# Patient Record
Sex: FEMALE | Race: WHITE | NOT HISPANIC OR LATINO | Employment: OTHER | URBAN - NONMETROPOLITAN AREA
[De-identification: names, ages, dates, MRNs, and addresses within clinical notes are randomized per-mention and may not be internally consistent; named-entity substitution may affect disease eponyms.]

---

## 2017-01-23 ENCOUNTER — TELEPHONE (OUTPATIENT)
Dept: FAMILY MEDICINE | Facility: OTHER | Age: 77
End: 2017-01-23

## 2017-01-23 NOTE — TELEPHONE ENCOUNTER
Patient was advised to go to Urgent Care for evaluation and declined as she feels fine now, but would like to schedule an office visit with Dr. Bolaños for a future date to 'touch base'. She has been scheduled for 1/30/17 and placed on wait list. Patient verbalizes understanding to go to ER/UC should she not feel well.

## 2017-01-23 NOTE — TELEPHONE ENCOUNTER
Please schedule patient for date/time: should go to urgent care to be evaluated     Have patient go to ER/Urgent Care Center. Urgent Care hours are 9:30 am to 8 pm, open 7 days a week. Yes.    Provider will call patient.No.    Other:

## 2017-01-30 ENCOUNTER — OFFICE VISIT (OUTPATIENT)
Dept: FAMILY MEDICINE | Facility: OTHER | Age: 77
End: 2017-01-30
Attending: FAMILY MEDICINE
Payer: MEDICARE

## 2017-01-30 VITALS
OXYGEN SATURATION: 96 % | DIASTOLIC BLOOD PRESSURE: 78 MMHG | BODY MASS INDEX: 29.29 KG/M2 | HEART RATE: 74 BPM | SYSTOLIC BLOOD PRESSURE: 118 MMHG | WEIGHT: 168 LBS | RESPIRATION RATE: 20 BRPM

## 2017-01-30 DIAGNOSIS — R07.9 CHEST PAIN, UNSPECIFIED TYPE: Primary | ICD-10-CM

## 2017-01-30 DIAGNOSIS — F41.9 ANXIETY: ICD-10-CM

## 2017-01-30 PROCEDURE — 99212 OFFICE O/P EST SF 10 MIN: CPT

## 2017-01-30 PROCEDURE — 99213 OFFICE O/P EST LOW 20 MIN: CPT | Performed by: FAMILY MEDICINE

## 2017-01-30 RX ORDER — SERTRALINE HYDROCHLORIDE 25 MG/1
25 TABLET, FILM COATED ORAL DAILY
Qty: 30 TABLET | Refills: 1 | Status: SHIPPED | OUTPATIENT
Start: 2017-01-30 | End: 2017-02-28

## 2017-01-30 ASSESSMENT — ANXIETY QUESTIONNAIRES
3. WORRYING TOO MUCH ABOUT DIFFERENT THINGS: SEVERAL DAYS
7. FEELING AFRAID AS IF SOMETHING AWFUL MIGHT HAPPEN: SEVERAL DAYS
6. BECOMING EASILY ANNOYED OR IRRITABLE: SEVERAL DAYS
IF YOU CHECKED OFF ANY PROBLEMS ON THIS QUESTIONNAIRE, HOW DIFFICULT HAVE THESE PROBLEMS MADE IT FOR YOU TO DO YOUR WORK, TAKE CARE OF THINGS AT HOME, OR GET ALONG WITH OTHER PEOPLE: SOMEWHAT DIFFICULT
1. FEELING NERVOUS, ANXIOUS, OR ON EDGE: MORE THAN HALF THE DAYS
GAD7 TOTAL SCORE: 7
2. NOT BEING ABLE TO STOP OR CONTROL WORRYING: SEVERAL DAYS
5. BEING SO RESTLESS THAT IT IS HARD TO SIT STILL: NOT AT ALL

## 2017-01-30 ASSESSMENT — PATIENT HEALTH QUESTIONNAIRE - PHQ9: 5. POOR APPETITE OR OVEREATING: SEVERAL DAYS

## 2017-01-30 ASSESSMENT — PAIN SCALES - GENERAL: PAINLEVEL: NO PAIN (0)

## 2017-01-30 NOTE — PROGRESS NOTES
[facility]    Iris Ellis, 76 year old, female presents with   Chief Complaint   Patient presents with     Respiratory Problems     had some chest discomfort 2 weeks ago, hasn't had pain since. This lasted about 5 minutes in the epigastric area when she was reading in bed. She has occasional heartburn that is relieved with 2 Tums about 2 times per week. No pain with walking stairs or carrying groceries. She is anxious with caring for a new puppy that she is training and concerns with her heart with this episode. No previous history of heart diease       PAST MEDICAL HISTORY:  Past Medical History   Diagnosis Date     Pure hypercholesterolemia 11/22/1999     Unspecified essential hypertension 07/30/2006     Symptomatic menopausal or female climacteric states 03/31/2003     Family history of malignant neoplasm of gastrointestinal tract 04/25/2003     colon cancer in mother     psoriasis 04/21/2011     Unspecified closed fracture of pelvis 09/20/2011     Other abnormal blood chemistry 09/20/2012     Cystic disease of ovaries      Osteopenia      Dexa 2004,2006,2009     Blood glucose elevated      Osteoarthritis 1/20011     Right hip LYNN, Dr Nelson       PAST SURGICAL HISTORY:  Past Surgical History   Procedure Laterality Date     Pacs, pvcs, svt, bradycardia on holter moniter  2009     Dr. Pace     Left superior and inferior pubic rami fractures  2006     Urethral dilation  2003     Urethral stricture     Declines further mammograms  2009     Colonoscopies  1998, 2003, 2007, 2009, 9/18/2014     Colonic polyps. Repeat colonoscopy 2013     Benign positional vertigo       Cholecystectomy  1979     Appendectomy  1944     Orthopedic surgery  2011     total right hip. Dr. Nelson       SOCIAL HISTORY  Social History     Social History     Marital Status:      Spouse Name: N/A     Number of Children: N/A     Years of Education: N/A     Occupational History      Retired     Social History  Main Topics     Smoking status: Former Smoker     Types: Cigarettes     Smokeless tobacco: Never Used     Alcohol Use: Yes      Comment: rarely     Drug Use: No     Sexual Activity: Not on file     Other Topics Concern     Caffeine Concern Yes     coffee-16 oz daily     Parent/Sibling W/ Cabg, Mi Or Angioplasty Before 65f 55m? No     Social History Narrative       MEDICATIONS:  Prior to Admission medications    Medication Sig Start Date End Date Taking? Authorizing Provider   sertraline (ZOLOFT) 25 MG tablet Take 1 tablet (25 mg) by mouth daily 1/30/17  Yes Anna Bolaños MD   lisinopril (PRINIVIL,ZESTRIL) 10 MG tablet Take 1 tablet (10mg) by oral route every day 10/19/16  Yes Anna Bolaños MD   rosuvastatin (CRESTOR) 10 MG tablet Take 1 tablet (10 mg) by mouth daily 7/31/16  Yes Anna Bolaños MD   ASPIRIN PO Take 81 mg by mouth daily   Yes Reported, Patient   Multiple Vitamins-Minerals (OCUVITE PO) Take 1 capsule by mouth daily.   Yes Reported, Patient   Calcium Carbonate-Vit D-Min (CALCIUM 1200 PO) Take  by mouth. Take 1 daily   Yes Reported, Patient   Lactobacillus (ACIDOPHILUS PO) Take  by mouth. 1 daily   Yes Reported, Patient       ALLERGIES:     Allergies   Allergen Reactions     Acetaminophen Nausea     Lortab     Hydrocodone Bitartrate Nausea     Lortab     Naproxen Nausea     Naprosyn     Rofecoxib      Upsets stomach  Vioxx       ROS:  C: NEGATIVE for fever, chills, change in weight  R: NEGATIVE for significant cough or SOB  GI: NEGATIVE for nausea, abdominal pain, heartburn, or change in bowel habits  N: NEGATIVE for weakness, dizziness or paresthesias  P: NEGATIVE for changes in mood or affect    EXAM:  /78 mmHg  Pulse 74  Resp 20  Wt 168 lb (76.204 kg)  SpO2 96% Body mass index is 29.29 kg/(m^2).   GENERAL APPEARANCE: healthy, alert and no distress  NECK: no adenopathy, no asymmetry, masses, or scars and thyroid normal to palpation  RESP: lungs clear to auscultation - no rales, rhonchi  or wheezes  CV: regular rates and rhythm, normal S1 S2, no S3 or S4, no murmur, click or rub and no bruits heard  ABDOMEN: soft, nontender, without hepatosplenomegaly or masses, bowel sounds normal and no bruits heard  NEURO: Normal strength and tone, mentation intact and speech normal  PSYCH: mentation appears normal and affect normal/bright    LABS AND IMAGING:           ASSESSMENT/PLAN:  (R07.9) Chest pain, unspecified type  (primary encounter diagnosis)  Comment:   Plan: NM Lexiscan stress test        Will proceed with stress test to evaluate further. Discussed that I feel this is most likely reflux but we want to check it out    (F41.9) Anxiety  Comment:   Plan: sertraline (ZOLOFT) 25 MG tablet        Will start this daily and recheck in one month, sooner for concerns      Anna Bolaños MD  January 30, 2017

## 2017-01-30 NOTE — MR AVS SNAPSHOT
After Visit Summary   1/30/2017    Iris Ellis    MRN: [id number]           Patient Information     Date Of Birth          1940        Visit Information        Provider Department      1/30/2017 9:45 AM Anna Bolaños MD [facility]        Today's Diagnoses     Chest pain, unspecified type    -  1     Anxiety            Follow-ups after your visit        Follow-up notes from your care team     Return in about 4 weeks (around 2/27/2017) for medication check sertraline.      Your next 10 appointments already scheduled     Feb 16, 2017 11:15 AM   (Arrive by 11:00 AM)   SHORT with Anna Bolaños MD   [facility] ([facility])    [address]  [place] [address]   [phone]              Who to contact     If you have questions or need follow up information about today's clinic visit or your schedule please contact [facility] directly at [phone].  Normal or non-critical lab and imaging results will be communicated to you by MyChart, letter or phone within 4 business days after the clinic has received the results. If you do not hear from us within 7 days, please contact the clinic through [employer] or phone. If you have a critical or abnormal lab result, we will notify you by phone as soon as possible.  Submit refill requests through [employer] or call your pharmacy and they will forward the refill request to us. Please allow 3 business days for your refill to be completed.          Additional Information About Your Visit        MyChart Information     [employer] gives you secure access to your electronic health record. If you see a primary care provider, you can also send messages to your care team and make appointments. If you have questions, please call your primary care clinic.  If you do not have a primary care provider, please call [phone] and they will assist you.        Care EveryWhere ID     This is your Care EveryWhere ID. This  could be used by other organizations to access your [place] medical records  [id number]        Your Vitals Were     Pulse Respirations Pulse Oximetry             74 20 96%          Blood Pressure from Last 3 Encounters:   01/30/17 118/78   08/16/16 112/64   01/19/15 128/72    Weight from Last 3 Encounters:   01/30/17 168 lb (76.204 kg)   08/16/16 155 lb (70.308 kg)   01/19/15 155 lb (70.308 kg)              We Performed the Following     NM Lexiscan stress test          Today's Medication Changes          These changes are accurate as of: 1/30/17 10:26 AM.  If you have any questions, ask your nurse or doctor.               Start taking these medicines.        Dose/Directions    sertraline 25 MG tablet   Commonly known as:  ZOLOFT   Used for:  Anxiety   Started by:  Anna Bolaños MD        Dose:  25 mg   Take 1 tablet (25 mg) by mouth daily   Quantity:  30 tablet   Refills:  1            Where to get your medicines      These medications were sent to [facility]  [address]  [address] [place] [address]     Phone:  [phone]    - sertraline 25 MG tablet             Primary Care Provider Office Phone # Fax #    Anna Bolaños [phone]       [facility] [address] [place] [address]        Thank you!     Thank you for choosing [facility]  for your care. Our goal is always to provide you with excellent care. Hearing back from our patients is one way we can continue to improve our services. Please take a few minutes to complete the written survey that you may receive in the mail after your visit with us. Thank you!             Your Updated Medication List - Protect others around you: Learn how to safely use, store and throw away your medicines at www.disposemymeds.org.          This list is accurate as of: 1/30/17 10:26 AM.  Always use your most recent med list.                   Brand Name Dispense Instructions for use     ACIDOPHILUS PO      Take  by mouth. 1 daily       ASPIRIN PO      Take 81 mg by mouth daily       CALCIUM 1200 PO      Take  by mouth. Take 1 daily       lisinopril 10 MG tablet    PRINIVIL/ZESTRIL    90 tablet    Take 1 tablet (10mg) by oral route every day       OCUVITE PO      Take 1 capsule by mouth daily.       rosuvastatin 10 MG tablet    CRESTOR    90 tablet    Take 1 tablet (10 mg) by mouth daily       sertraline 25 MG tablet    ZOLOFT    30 tablet    Take 1 tablet (25 mg) by mouth daily

## 2017-01-30 NOTE — NURSING NOTE
"Chief Complaint   Patient presents with     Respiratory Problems     had some chest discomfort 2 weeks ago, hasn't had pain since.        Initial /78 mmHg  Pulse 74  Resp 20  Wt 168 lb (76.204 kg)  SpO2 96% Estimated body mass index is 29.29 kg/(m^2) as calculated from the following:    Height as of 1/19/15: 5' 3.5\" (1.613 m).    Weight as of this encounter: 168 lb (76.204 kg).  BP completed using cuff size: huber White      "

## 2017-01-31 ASSESSMENT — ANXIETY QUESTIONNAIRES: GAD7 TOTAL SCORE: 7

## 2017-01-31 ASSESSMENT — PATIENT HEALTH QUESTIONNAIRE - PHQ9: SUM OF ALL RESPONSES TO PHQ QUESTIONS 1-9: 7

## 2017-02-06 DIAGNOSIS — R07.9 CHEST PAIN, UNSPECIFIED: Primary | ICD-10-CM

## 2017-02-09 ENCOUNTER — HOSPITAL ENCOUNTER (OUTPATIENT)
Dept: NUCLEAR MEDICINE | Facility: HOSPITAL | Age: 77
Discharge: HOME | End: 2017-02-09
Attending: FAMILY MEDICINE | Admitting: FAMILY MEDICINE
Payer: MEDICARE

## 2017-02-09 PROCEDURE — 78452 HT MUSCLE IMAGE SPECT MULT: CPT | Mod: TC

## 2017-02-09 PROCEDURE — 93018 CV STRESS TEST I&R ONLY: CPT | Performed by: INTERNAL MEDICINE

## 2017-02-09 PROCEDURE — A9500 TC99M SESTAMIBI: HCPCS | Mod: TC

## 2017-02-09 PROCEDURE — 93017 CV STRESS TEST TRACING ONLY: CPT | Mod: TC

## 2017-02-09 PROCEDURE — 93016 CV STRESS TEST SUPVJ ONLY: CPT | Performed by: INTERNAL MEDICINE

## 2017-02-09 RX ORDER — REGADENOSON 0.08 MG/ML
0.4 INJECTION, SOLUTION INTRAVENOUS ONCE
Status: COMPLETED | OUTPATIENT
Start: 2017-02-09 | End: 2017-02-09

## 2017-02-09 RX ADMIN — REGADENOSON 0.4 MG: 0.08 INJECTION, SOLUTION INTRAVENOUS at 09:45

## 2017-02-28 ENCOUNTER — OFFICE VISIT (OUTPATIENT)
Dept: FAMILY MEDICINE | Facility: OTHER | Age: 77
End: 2017-02-28
Attending: FAMILY MEDICINE
Payer: MEDICARE

## 2017-02-28 VITALS
BODY MASS INDEX: 29.29 KG/M2 | RESPIRATION RATE: 20 BRPM | OXYGEN SATURATION: 98 % | WEIGHT: 168 LBS | SYSTOLIC BLOOD PRESSURE: 122 MMHG | HEART RATE: 70 BPM | DIASTOLIC BLOOD PRESSURE: 74 MMHG

## 2017-02-28 DIAGNOSIS — F41.9 ANXIETY: Primary | ICD-10-CM

## 2017-02-28 DIAGNOSIS — K21.9 GASTROESOPHAGEAL REFLUX DISEASE WITHOUT ESOPHAGITIS: ICD-10-CM

## 2017-02-28 PROCEDURE — 99213 OFFICE O/P EST LOW 20 MIN: CPT | Performed by: FAMILY MEDICINE

## 2017-02-28 PROCEDURE — 99212 OFFICE O/P EST SF 10 MIN: CPT

## 2017-02-28 RX ORDER — SERTRALINE HYDROCHLORIDE 25 MG/1
50 TABLET, FILM COATED ORAL DAILY
Qty: 60 TABLET | Refills: 1 | Status: SHIPPED | OUTPATIENT
Start: 2017-02-28 | End: 2017-04-24

## 2017-02-28 ASSESSMENT — PAIN SCALES - GENERAL: PAINLEVEL: NO PAIN (0)

## 2017-02-28 NOTE — NURSING NOTE
"Chief Complaint   Patient presents with     Anxiety     follow up Sertraline. Symptoms stable       Initial /74  Pulse 70  Resp 20  Wt 168 lb (76.2 kg)  SpO2 98%  BMI 29.29 kg/m2 Estimated body mass index is 29.29 kg/(m^2) as calculated from the following:    Height as of 1/19/15: 5' 3.5\" (1.613 m).    Weight as of this encounter: 168 lb (76.2 kg).  Medication Reconciliation: complete   Alba White      "

## 2017-02-28 NOTE — MR AVS SNAPSHOT
After Visit Summary   2/28/2017    Iris Ellis    MRN: [id number]           Patient Information     Date Of Birth          1940        Visit Information        Provider Department      2/28/2017 10:45 AM Anna Bolaños MD [facility]        Today's Diagnoses     Anxiety    -  1    Gastroesophageal reflux disease without esophagitis           Follow-ups after your visit        Follow-up notes from your care team     Return in about 4 weeks (around 3/28/2017) for medication check.      Your next 10 appointments already scheduled     Apr 04, 2017 10:45 AM CDT   (Arrive by 10:30 AM)   SHORT with MD [facility] ([facility] [place] [facility])    [address]  [place] [address]   [phone]              Who to contact     If you have questions or need follow up information about today's clinic visit or your schedule please contact [facility] directly at [phone].  Normal or non-critical lab and imaging results will be communicated to you by MyChart, letter or phone within 4 business days after the clinic has received the results. If you do not hear from us within 7 days, please contact the clinic through [employer] or phone. If you have a critical or abnormal lab result, we will notify you by phone as soon as possible.  Submit refill requests through [employer] or call your pharmacy and they will forward the refill request to us. Please allow 3 business days for your refill to be completed.          Additional Information About Your Visit        MyChart Information     [employer] gives you secure access to your electronic health record. If you see a primary care provider, you can also send messages to your care team and make appointments. If you have questions, please call your primary care clinic.  If you do not have a primary care provider, please call [phone] and they will assist you.        Care EveryWhere ID     This is your Care  EveryWhere ID. This could be used by other organizations to access your [place] medical records  [id number]        Your Vitals Were     Pulse Respirations Pulse Oximetry BMI (Body Mass Index)          70 20 98% 29.29 kg/m2         Blood Pressure from Last 3 Encounters:   02/28/17 122/74   01/30/17 118/78   08/16/16 112/64    Weight from Last 3 Encounters:   02/28/17 168 lb (76.2 kg)   01/30/17 168 lb (76.2 kg)   08/16/16 155 lb (70.3 kg)              Today, you had the following     No orders found for display         Today's Medication Changes          These changes are accurate as of: 2/28/17 11:18 AM.  If you have any questions, ask your nurse or doctor.               These medicines have changed or have updated prescriptions.        Dose/Directions    sertraline 25 MG tablet   Commonly known as:  ZOLOFT   This may have changed:  how much to take   Used for:  Anxiety   Changed by:  Anna Bolaños MD        Dose:  50 mg   Take 2 tablets (50 mg) by mouth daily   Quantity:  60 tablet   Refills:  1            Where to get your medicines      These medications were sent to [facility] - [address]  [address] [place] [address]     Phone:  [phone]     sertraline 25 MG tablet                Primary Care Provider Office Phone # Fax #    Anna Bolaños [phone]       [facility] [address] [place] [place] [address]        Thank you!     Thank you for choosing [facility]  for your care. Our goal is always to provide you with excellent care. Hearing back from our patients is one way we can continue to improve our services. Please take a few minutes to complete the written survey that you may receive in the mail after your visit with us. Thank you!             Your Updated Medication List - Protect others around you: Learn how to safely use, store and throw away your medicines at www.disposemymeds.org.          This list is accurate as  of: 2/28/17 11:18 AM.  Always use your most recent med list.                   Brand Name Dispense Instructions for use    ACIDOPHILUS PO      Take  by mouth. 1 daily       ASPIRIN PO      Take 81 mg by mouth daily       CALCIUM 1200 PO      Take  by mouth. Take 1 daily       lisinopril 10 MG tablet    PRINIVIL/ZESTRIL    90 tablet    Take 1 tablet (10mg) by oral route every day       OCUVITE PO      Take 1 capsule by mouth daily.       rosuvastatin 10 MG tablet    CRESTOR    90 tablet    Take 1 tablet (10 mg) by mouth daily       sertraline 25 MG tablet    ZOLOFT    60 tablet    Take 2 tablets (50 mg) by mouth daily

## 2017-03-01 NOTE — PROGRESS NOTES
[facility]    Iris Ellis, 76 year old, female presents with   Chief Complaint   Patient presents with     Anxiety     follow up Sertraline. Symptoms stable. Gave PHQ and IMLES but pt did not fill out     Gastrophageal Reflux     she is having reflux intermittently and is drinking tonic water for restless legs. She is wondering if the tonic water is bothering her with its carbonation       PAST MEDICAL HISTORY:  Past Medical History   Diagnosis Date     Blood glucose elevated      Cystic disease of ovaries      Family history of malignant neoplasm of gastrointestinal tract 04/25/2003     colon cancer in mother     Osteoarthritis 1/20011     Right hip LYNN, Dr Nelson     Osteopenia      Dexa 2004,2006,2009     Other abnormal blood chemistry 09/20/2012     psoriasis 04/21/2011     Pure hypercholesterolemia 11/22/1999     Symptomatic menopausal or female climacteric states 03/31/2003     Unspecified closed fracture of pelvis 09/20/2011     Unspecified essential hypertension 07/30/2006       PAST SURGICAL HISTORY:  Past Surgical History   Procedure Laterality Date     Pacs, pvcs, svt, bradycardia on holter moniter  2009     Dr. Pace     Left superior and inferior pubic rami fractures  2006     Urethral dilation  2003     Urethral stricture     Declines further mammograms  2009     Colonoscopies  1998, 2003, 2007, 2009, 9/18/2014     Colonic polyps. Repeat colonoscopy 2013     Benign positional vertigo       Cholecystectomy  1979     Appendectomy  1944     Orthopedic surgery  2011     total right hip. Dr. Nelson       SOCIAL HISTORY  Social History     Social History     Marital status:      Spouse name: N/A     Number of children: N/A     Years of education: N/A     Occupational History      Retired     Social History Main Topics     Smoking status: Former Smoker     Types: Cigarettes     Smokeless tobacco: Never Used     Alcohol use Yes      Comment: rarely     Drug use: No      Sexual activity: Not on file     Other Topics Concern     Caffeine Concern Yes     coffee-16 oz daily     Parent/Sibling W/ Cabg, Mi Or Angioplasty Before 65f 55m? No     Social History Narrative       MEDICATIONS:  Prior to Admission medications    Medication Sig Start Date End Date Taking? Authorizing Provider   sertraline (ZOLOFT) 25 MG tablet Take 2 tablets (50 mg) by mouth daily 2/28/17  Yes Anna Bolaños MD   lisinopril (PRINIVIL,ZESTRIL) 10 MG tablet Take 1 tablet (10mg) by oral route every day 10/19/16  Yes Anna Bolaños MD   rosuvastatin (CRESTOR) 10 MG tablet Take 1 tablet (10 mg) by mouth daily 7/31/16  Yes Anna Bolaños MD   ASPIRIN PO Take 81 mg by mouth daily   Yes Reported, Patient   Multiple Vitamins-Minerals (OCUVITE PO) Take 1 capsule by mouth daily.   Yes Reported, Patient   Calcium Carbonate-Vit D-Min (CALCIUM 1200 PO) Take  by mouth. Take 1 daily   Yes Reported, Patient   Lactobacillus (ACIDOPHILUS PO) Take  by mouth. 1 daily   Yes Reported, Patient       ALLERGIES:     Allergies   Allergen Reactions     Acetaminophen Nausea     Lortab     Hydrocodone Bitartrate Nausea     Lortab     Naproxen Nausea     Naprosyn     Rofecoxib      Upsets stomach  Vioxx       ROS:  C: NEGATIVE for fever, chills, change in weight  R: NEGATIVE for significant cough or SOB  CV: NEGATIVE for chest pain, palpitations or peripheral edema  N: NEGATIVE for weakness, dizziness or paresthesias  P: NEGATIVE for changes in mood or affect    EXAM:  /74  Pulse 70  Resp 20  Wt 168 lb (76.2 kg)  SpO2 98%  BMI 29.29 kg/m2 Body mass index is 29.29 kg/(m^2).   GENERAL APPEARANCE: healthy, alert and no distress  RESP: lungs clear to auscultation - no rales, rhonchi or wheezes  CV: regular rates and rhythm, normal S1 S2, no S3 or S4 and no murmur, click or rub  NEURO: Normal strength and tone, mentation intact and speech normal  PSYCH: mentation appears normal and affect normal/bright    LABS AND IMAGING:     Results for  orders placed or performed in visit on 02/06/17   Stress EKG Interpretation    Narrative    LEXISCAN CARDIOLITE STUDY-INTERNAL MEDICINE DICTATION  DICTATING PHYSICIAN: Ej West MD    INDICATIONS:  A 76-year-old female referred by Dr. Anna Bolaños  because of chest pain.    FINDINGS:  The patient was placed upon the table using our protocol,  given 5 mL/0.4 mg of Lexiscan  rapidly over 15 seconds followed by  saline flush. She was then given the Sestamibi at 45 seconds into the  study.  Resting heart rate was 72 with blood pressure 140/60.  Heart  rate jhoana to 88 and blood pressure dropped to 125/60.  She had some  transient stomach discomfort.  Electrocardiogram shows some  nonspecific ST-T changes at rest and throughout the entire study.  She  had no arrhythmias.    ASSESSMENT:  Lexiscan Sestamibi study. The patient had appropriate  heart rate and blood pressure response.  Transient symptoms.  No acute  changes were noted on the electrocardiogram.  There were no  arrhythmias.  The Cardiolite scan is pending.    CARDIOLITE IV LEXISCAN AND GATED SPECT-RADIOLOGIST DICTATION  DICTATING PHYSICIAN:  Cesar Nelson MD    LEXISCAN DOSE: 5 mL (0.4 mg regadenoson) by rapid intravenous  injection.  SESTAMIBI DOSE: 10 mCi 99mTc, 1 mL MIBI   Injection Time:  0711  SESTAMIBI DOSE: 30 mCi 99mTc, 1 mL MIBI   Injection Time:  1001  ANGIOCATH SIZE:  22  INJECTION SITE:  Right wrist  INITIALS:  SE  IV DISCONTINUE TIME:  @@  SITE CONDITION: No infiltrate  TIP INTACT:  Yes    The patient is a 76-year-old female who was evaluated with  Sestamibi/Lexiscan testing. 5 mL Lexiscan (0.4 mg regadenoson) was  administered by rapid intravenous injection; followed immediately by  saline flush and radiopharmaceutical.  The patient's resting heart  rate was 72 and blood pressure was 140/60. The maximum response in  heart rate and blood pressure after Lexiscan injection was 88 and  125/60.  Continued on page 2...      Symptoms during the  procedure included:   Transient stomach  discomfort.    If symptoms were present, did they resolve post-test?   Yes.    Electrocardiogram monitoring demonstrates:   Nonspecific ST-T changes  at rest and throughout the study.    Arrhythmias:   None.    Summary:   Appropriate heart rate and blood pressure response.  Transient symptoms.  No acute electrocardiogram changes.    VIEWS OBTAINED:  Short axis, horizontal long axis, vertical long axis  at rest and stress.    FINDINGS:  On the rest and stress images, there was a normal  distribution of tracer activity throughout the myocardium.  There is  no evidence of myocardial ischemia.  Rest gated image reveals end  diastolic volume of 63 mL, end systolic volume of 14 mL.  Calculated  ejection fraction was 78%.  No wall motion abnormalities are noted.    IMPRESSION:  1.  No evidence of myocardial ischemia.  2.  Normal left ventricular function.                        SIGNATURE PAGE ONLY  Exam Date: Feb 09, 2017 10:11:08 AM  Author: RONN LAURA  This report is final and signed           ASSESSMENT/PLAN:  (F41.9) Anxiety  (primary encounter diagnosis)  Comment: improved  Plan: sertraline (ZOLOFT) 25 MG tablet        Will increase to 2-25 mg tablets for 50 mg daily and recheck in 4-6 weeks. Her negative stress test was a relief for her    (K21.9) Gastroesophageal reflux disease without esophagitis  Comment:   Plan: advised stirring out the carbonation of the tonic water to see if this is helpful  Use OTC medications as needed prn for heartburn as this is an intermittent problem    Anna Bolaños MD  March 1, 2017

## 2017-03-08 DIAGNOSIS — E78.5 HYPERLIPIDEMIA LDL GOAL <100: ICD-10-CM

## 2017-03-08 DIAGNOSIS — I10 BENIGN ESSENTIAL HYPERTENSION: ICD-10-CM

## 2017-03-08 RX ORDER — ROSUVASTATIN CALCIUM 10 MG/1
10 TABLET, COATED ORAL DAILY
Qty: 90 TABLET | Refills: 1 | Status: SHIPPED
Start: 2017-03-08 | End: 2017-04-24

## 2017-03-08 RX ORDER — LISINOPRIL 10 MG/1
TABLET ORAL
Qty: 90 TABLET | Refills: 1 | Status: SHIPPED
Start: 2017-03-08 | End: 2017-04-24

## 2017-04-24 ENCOUNTER — OFFICE VISIT (OUTPATIENT)
Dept: FAMILY MEDICINE | Facility: OTHER | Age: 77
End: 2017-04-24
Attending: FAMILY MEDICINE
Payer: MEDICARE

## 2017-04-24 VITALS
TEMPERATURE: 96.5 F | DIASTOLIC BLOOD PRESSURE: 72 MMHG | OXYGEN SATURATION: 95 % | SYSTOLIC BLOOD PRESSURE: 126 MMHG | BODY MASS INDEX: 28.77 KG/M2 | HEART RATE: 75 BPM | WEIGHT: 165 LBS

## 2017-04-24 DIAGNOSIS — E78.5 HYPERLIPIDEMIA LDL GOAL <100: ICD-10-CM

## 2017-04-24 DIAGNOSIS — K21.9 GASTROESOPHAGEAL REFLUX DISEASE WITHOUT ESOPHAGITIS: ICD-10-CM

## 2017-04-24 DIAGNOSIS — I10 BENIGN ESSENTIAL HYPERTENSION: ICD-10-CM

## 2017-04-24 DIAGNOSIS — F41.9 ANXIETY: Primary | ICD-10-CM

## 2017-04-24 PROCEDURE — 99212 OFFICE O/P EST SF 10 MIN: CPT

## 2017-04-24 PROCEDURE — 99214 OFFICE O/P EST MOD 30 MIN: CPT | Performed by: FAMILY MEDICINE

## 2017-04-24 RX ORDER — ROSUVASTATIN CALCIUM 10 MG/1
10 TABLET, COATED ORAL DAILY
Qty: 90 TABLET | Refills: 1 | Status: SHIPPED | OUTPATIENT
Start: 2017-04-24 | End: 2017-07-27

## 2017-04-24 RX ORDER — SERTRALINE HYDROCHLORIDE 25 MG/1
25 TABLET, FILM COATED ORAL DAILY
Qty: 90 TABLET | Refills: 1 | Status: SHIPPED | OUTPATIENT
Start: 2017-04-24 | End: 2017-11-20

## 2017-04-24 RX ORDER — OMEPRAZOLE 40 MG/1
40 CAPSULE, DELAYED RELEASE ORAL DAILY
Qty: 90 CAPSULE | Refills: 1 | Status: SHIPPED | OUTPATIENT
Start: 2017-04-24 | End: 2018-06-21

## 2017-04-24 RX ORDER — LISINOPRIL 10 MG/1
TABLET ORAL
Qty: 90 TABLET | Refills: 1 | Status: SHIPPED | OUTPATIENT
Start: 2017-04-24 | End: 2017-11-20

## 2017-04-24 ASSESSMENT — ANXIETY QUESTIONNAIRES
5. BEING SO RESTLESS THAT IT IS HARD TO SIT STILL: NOT AT ALL
7. FEELING AFRAID AS IF SOMETHING AWFUL MIGHT HAPPEN: NOT AT ALL
1. FEELING NERVOUS, ANXIOUS, OR ON EDGE: NOT AT ALL
IF YOU CHECKED OFF ANY PROBLEMS ON THIS QUESTIONNAIRE, HOW DIFFICULT HAVE THESE PROBLEMS MADE IT FOR YOU TO DO YOUR WORK, TAKE CARE OF THINGS AT HOME, OR GET ALONG WITH OTHER PEOPLE: NOT DIFFICULT AT ALL
GAD7 TOTAL SCORE: 1
6. BECOMING EASILY ANNOYED OR IRRITABLE: SEVERAL DAYS
4. TROUBLE RELAXING: NOT AT ALL
3. WORRYING TOO MUCH ABOUT DIFFERENT THINGS: NOT AT ALL
2. NOT BEING ABLE TO STOP OR CONTROL WORRYING: NOT AT ALL

## 2017-04-24 ASSESSMENT — PAIN SCALES - GENERAL: PAINLEVEL: NO PAIN (0)

## 2017-04-24 NOTE — MR AVS SNAPSHOT
After Visit Summary   4/24/2017    Iris Ellis    MRN: [id number]           Patient Information     Date Of Birth          1940        Visit Information        Provider Department      4/24/2017 11:30 AM Anna Bolaños MD [facility]        Today's Diagnoses     Anxiety    -  1    Gastroesophageal reflux disease without esophagitis        Benign essential hypertension        Hyperlipidemia LDL goal <100           Follow-ups after your visit        Follow-up notes from your care team     Return in about 3 months (around 7/24/2017).      Who to contact     If you have questions or need follow up information about today's clinic visit or your schedule please contact [facility] directly at [phone].  Normal or non-critical lab and imaging results will be communicated to you by MyChart, letter or phone within 4 business days after the clinic has received the results. If you do not hear from us within 7 days, please contact the clinic through MyChart or phone. If you have a critical or abnormal lab result, we will notify you by phone as soon as possible.  Submit refill requests through Ratio or call your pharmacy and they will forward the refill request to us. Please allow 3 business days for your refill to be completed.          Additional Information About Your Visit        MyChart Information     Ratio gives you secure access to your electronic health record. If you see a primary care provider, you can also send messages to your care team and make appointments. If you have questions, please call your primary care clinic.  If you do not have a primary care provider, please call [phone] and they will assist you.        Care EveryWhere ID     This is your Care EveryWhere ID. This could be used by other organizations to access your [facility] medical records  [id number]        Your Vitals Were     Pulse Temperature Pulse Oximetry BMI (Body Mass Index)           75 96.5  F (35.8  C) (Tympanic) 95% 28.77 kg/m2         Blood Pressure from Last 3 Encounters:   04/24/17 126/72   02/28/17 122/74   01/30/17 118/78    Weight from Last 3 Encounters:   04/24/17 165 lb (74.8 kg)   02/28/17 168 lb (76.2 kg)   01/30/17 168 lb (76.2 kg)              Today, you had the following     No orders found for display         Today's Medication Changes          These changes are accurate as of: 4/24/17 10:21 PM.  If you have any questions, ask your nurse or doctor.               Start taking these medicines.        Dose/Directions    omeprazole 40 MG capsule   Commonly known as:  priLOSEC   Used for:  Gastroesophageal reflux disease without esophagitis   Started by:  Anna Bolaños MD        Dose:  40 mg   Take 1 capsule (40 mg) by mouth daily Take 30-60 minutes before a meal.   Quantity:  90 capsule   Refills:  1         These medicines have changed or have updated prescriptions.        Dose/Directions    multivitamin Tabs tablet   This may have changed:  how much to take   Used for:  Benign essential hypertension   Changed by:  Anna Bolaños MD        Dose:  1 tablet   Take 1 tablet by mouth daily   Quantity:  90 each   Refills:  3       sertraline 25 MG tablet   Commonly known as:  ZOLOFT   This may have changed:    - how much to take  - additional instructions   Used for:  Anxiety   Changed by:  Anna Bolaños MD        Dose:  25 mg   Take 1 tablet (25 mg) by mouth daily Has been taking 25 mg daily   Quantity:  90 tablet   Refills:  1            Where to get your medicines      Some of these will need a paper prescription and others can be bought over the counter.  Ask your nurse if you have questions.     Bring a paper prescription for each of these medications     lisinopril 10 MG tablet    multivitamin Tabs tablet    omeprazole 40 MG capsule    rosuvastatin 10 MG tablet    sertraline 25 MG tablet                Primary Care Provider Office Phone # Fax #    Anna Bolaños MD  [phone]       [facility] [place] [address]  [place] [address]        Thank you!     Thank you for choosing [facility]  for your care. Our goal is always to provide you with excellent care. Hearing back from our patients is one way we can continue to improve our services. Please take a few minutes to complete the written survey that you may receive in the mail after your visit with us. Thank you!             Your Updated Medication List - Protect others around you: Learn how to safely use, store and throw away your medicines at www.disposemymeds.org.          This list is accurate as of: 4/24/17 10:21 PM.  Always use your most recent med list.                   Brand Name Dispense Instructions for use    ACIDOPHILUS PO      Take  by mouth. 1 daily       ASPIRIN PO      Take 81 mg by mouth daily       CALCIUM 1200 PO      Take  by mouth. Take 1 daily       lisinopril 10 MG tablet    PRINIVIL/ZESTRIL    90 tablet    Take 1 tablet (10mg) by oral route every day       multivitamin Tabs tablet     90 each    Take 1 tablet by mouth daily       omeprazole 40 MG capsule    priLOSEC    90 capsule    Take 1 capsule (40 mg) by mouth daily Take 30-60 minutes before a meal.       rosuvastatin 10 MG tablet    CRESTOR    90 tablet    Take 1 tablet (10 mg) by mouth daily       sertraline 25 MG tablet    ZOLOFT    90 tablet    Take 1 tablet (25 mg) by mouth daily Has been taking 25 mg daily

## 2017-04-24 NOTE — NURSING NOTE
"Chief Complaint   Patient presents with     Anxiety     Improving with Sertraline--has been taking 25 mg daily     Gastrophageal Reflux     Improved since starting omeprazole       Initial /72 (BP Location: Right arm, Patient Position: Chair)  Pulse 75  Temp 96.5  F (35.8  C) (Tympanic)  Wt 165 lb (74.8 kg)  SpO2 95%  BMI 28.77 kg/m2 Estimated body mass index is 28.77 kg/(m^2) as calculated from the following:    Height as of 1/19/15: 5' 3.5\" (1.613 m).    Weight as of this encounter: 165 lb (74.8 kg).  Medication Reconciliation: complete     Lamar Luna      "

## 2017-04-25 ASSESSMENT — ANXIETY QUESTIONNAIRES: GAD7 TOTAL SCORE: 1

## 2017-04-25 NOTE — PROGRESS NOTES
[facility]    Iris Ellis, 77 year old, female presents with   Chief Complaint   Patient presents with     Anxiety     Improving with Sertraline--has been taking 25 mg daily. She is feeling much better.      Gastrophageal Reflux     Improved since starting omeprazole       PAST MEDICAL HISTORY:  Past Medical History:   Diagnosis Date     Blood glucose elevated      Cystic disease of ovaries      Family history of malignant neoplasm of gastrointestinal tract 04/25/2003    colon cancer in mother     Osteoarthritis 1/20011    Right hip L[facility]NN, Dr Nelson     Osteopenia     Dexa 2004,2006,2009     Other abnormal blood chemistry 09/20/2012     psoriasis 04/21/2011     Pure hypercholesterolemia 11/22/1999     Symptomatic menopausal or female climacteric states 03/31/2003     Unspecified closed fracture of pelvis 09/20/2011     Unspecified essential hypertension 07/30/2006       PAST SURGICAL HISTORY:  Past Surgical History:   Procedure Laterality Date     APPENDECTOMY  1944     Benign positional vertigo       CHOLECYSTECTOMY  1979     colonoscopies  1998, 2003, 2007, 2009, 9/18/2014    Colonic polyps. Repeat colonoscopy 2013     Declines further mammograms  2009     Left superior and inferior pubic rami fractures  2006     ORTHOPEDIC SURGERY  2011    total right hip. Dr. Nelson     PACs, PVCs, SVT, bradycardia on Holter Moniter  2009    Dr. Pace     Urethral dilation  2003    Urethral stricture       SOCIAL HISTORY  Social History     Social History     Marital status:      Spouse name: N/A     Number of children: N/A     Years of education: N/A     Occupational History      Retired     Social History Main Topics     Smoking status: Former Smoker     Types: Cigarettes     Smokeless tobacco: Never Used     Alcohol use Yes      Comment: rarely     Drug use: No     Sexual activity: Not on file     Other Topics Concern     Caffeine Concern Yes     coffee-16 oz daily     Parent/Sibling W/  Cabg, Mi Or Angioplasty Before 65f 55m? No     Social History Narrative       MEDICATIONS:  Prior to Admission medications    Medication Sig Start Date End Date Taking? Authorizing Provider   lisinopril (PRINIVIL/ZESTRIL) 10 MG tablet Take 1 tablet (10mg) by oral route every day 4/24/17  Yes Anna Bolaños MD   rosuvastatin (CRESTOR) 10 MG tablet Take 1 tablet (10 mg) by mouth daily 4/24/17  Yes Anna Bolaños MD   sertraline (ZOLOFT) 25 MG tablet Take 1 tablet (25 mg) by mouth daily Has been taking 25 mg daily 4/24/17  Yes Anna Bolaños MD   multivitamin (OCUVITE) TABS tablet Take 1 tablet by mouth daily 4/24/17  Yes Anna Bolaños MD   omeprazole (PRILOSEC) 40 MG capsule Take 1 capsule (40 mg) by mouth daily Take 30-60 minutes before a meal. 4/24/17  Yes Anna Bolaños MD   ASPIRIN PO Take 81 mg by mouth daily   Yes Reported, Patient   Calcium Carbonate-Vit D-Min (CALCIUM 1200 PO) Take  by mouth. Take 1 daily   Yes Reported, Patient   Lactobacillus (ACIDOPHILUS PO) Take  by mouth. 1 daily   Yes Reported, Patient       ALLERGIES:     Allergies   Allergen Reactions     Acetaminophen Nausea     Lortab     Hydrocodone Bitartrate Nausea     Lortab     Naproxen Nausea     Naprosyn     Rofecoxib      Upsets stomach  Vioxx       ROS:  C: NEGATIVE for fever, chills, change in weight  R: NEGATIVE for significant cough or SOB  CV: NEGATIVE for chest pain, palpitations or peripheral edema  GI: NEGATIVE for nausea, abdominal pain, heartburn, or change in bowel habits  N: NEGATIVE for weakness, dizziness or paresthesias  P: NEGATIVE for changes in mood or affect    EXAM:  /72 (BP Location: Right arm, Patient Position: Chair)  Pulse 75  Temp 96.5  F (35.8  C) (Tympanic)  Wt 165 lb (74.8 kg)  SpO2 95%  BMI 28.77 kg/m2 Body mass index is 28.77 kg/(m^2).   GENERAL APPEARANCE: healthy, alert and no distress  RESP: lungs clear to auscultation - no rales, rhonchi or wheezes  CV: regular rates and rhythm, normal S1 S2,  no S3 or S4 and no murmur, click or rub  NEURO: Normal strength and tone, mentation intact and speech normal  PSYCH: mentation appears normal and affect normal/bright    LABS AND IMAGING:           ASSESSMENT/PLAN:  (F41.9) Anxiety  (primary encounter diagnosis)  Comment: improved  Plan: sertraline (ZOLOFT) 25 MG tablet        Continue and recheck in 2-3 months, sooner for concerns    (K21.9) Gastroesophageal reflux disease without esophagitis  Comment: resolved on medication  Plan: omeprazole (PRILOSEC) 40 MG capsule        Continue and recheck in 2-3 months    (I10) Benign essential hypertension  Comment:   Plan: lisinopril (PRINIVIL/ZESTRIL) 10 MG tablet,         multivitamin (OCUVITE) TABS tablet        Medication renewed, controlled    (E78.5) Hyperlipidemia LDL goal <100  Comment:   Plan: rosuvastatin (CRESTOR) 10 MG tablet        Medication renewed      Anna Bolaños MD  April 24, 2017

## 2017-05-03 DIAGNOSIS — E78.5 HYPERLIPIDEMIA LDL GOAL <130: Primary | ICD-10-CM

## 2017-05-03 DIAGNOSIS — R73.09 ELEVATED GLUCOSE: ICD-10-CM

## 2017-07-26 DIAGNOSIS — R73.09 ELEVATED GLUCOSE: ICD-10-CM

## 2017-07-26 DIAGNOSIS — I10 BENIGN ESSENTIAL HYPERTENSION: ICD-10-CM

## 2017-07-26 DIAGNOSIS — E78.5 HYPERLIPIDEMIA LDL GOAL <100: ICD-10-CM

## 2017-07-26 LAB
ALBUMIN SERPL-MCNC: 3.6 G/DL (ref 3.4–5)
ALP SERPL-CCNC: 81 U/L (ref 40–150)
ALT SERPL W P-5'-P-CCNC: 30 U/L (ref 0–50)
ANION GAP SERPL CALCULATED.3IONS-SCNC: 6 MMOL/L (ref 3–14)
AST SERPL W P-5'-P-CCNC: 21 U/L (ref 0–45)
BILIRUB SERPL-MCNC: 0.5 MG/DL (ref 0.2–1.3)
BUN SERPL-MCNC: 11 MG/DL (ref 7–30)
CALCIUM SERPL-MCNC: 9 MG/DL (ref 8.5–10.1)
CHLORIDE SERPL-SCNC: 108 MMOL/L (ref 94–109)
CHOLEST SERPL-MCNC: 201 MG/DL
CO2 SERPL-SCNC: 26 MMOL/L (ref 20–32)
CREAT SERPL-MCNC: 0.73 MG/DL (ref 0.52–1.04)
ERYTHROCYTE [DISTWIDTH] IN BLOOD BY AUTOMATED COUNT: 13.6 % (ref 10–15)
EST. AVERAGE GLUCOSE BLD GHB EST-MCNC: 131 MG/DL
GFR SERPL CREATININE-BSD FRML MDRD: 77 ML/MIN/1.7M2
GLUCOSE SERPL-MCNC: 101 MG/DL (ref 70–99)
HBA1C MFR BLD: 6.2 % (ref 4.3–6)
HCT VFR BLD AUTO: 42.2 % (ref 35–47)
HDLC SERPL-MCNC: 67 MG/DL
HGB BLD-MCNC: 13.6 G/DL (ref 11.7–15.7)
LDLC SERPL CALC-MCNC: 107 MG/DL
MCH RBC QN AUTO: 28.3 PG (ref 26.5–33)
MCHC RBC AUTO-ENTMCNC: 32.2 G/DL (ref 31.5–36.5)
MCV RBC AUTO: 88 FL (ref 78–100)
NONHDLC SERPL-MCNC: 134 MG/DL
PLATELET # BLD AUTO: 372 10E9/L (ref 150–450)
POTASSIUM SERPL-SCNC: 4.1 MMOL/L (ref 3.4–5.3)
PROT SERPL-MCNC: 7.3 G/DL (ref 6.8–8.8)
RBC # BLD AUTO: 4.8 10E12/L (ref 3.8–5.2)
SODIUM SERPL-SCNC: 140 MMOL/L (ref 133–144)
TRIGL SERPL-MCNC: 136 MG/DL
WBC # BLD AUTO: 5.6 10E9/L (ref 4–11)

## 2017-07-26 PROCEDURE — 83036 HEMOGLOBIN GLYCOSYLATED A1C: CPT | Mod: ZL | Performed by: FAMILY MEDICINE

## 2017-07-26 PROCEDURE — 80053 COMPREHEN METABOLIC PANEL: CPT | Mod: ZL | Performed by: FAMILY MEDICINE

## 2017-07-26 PROCEDURE — 36415 COLL VENOUS BLD VENIPUNCTURE: CPT | Mod: ZL | Performed by: FAMILY MEDICINE

## 2017-07-26 PROCEDURE — 85027 COMPLETE CBC AUTOMATED: CPT | Mod: ZL | Performed by: FAMILY MEDICINE

## 2017-07-26 PROCEDURE — 80061 LIPID PANEL: CPT | Mod: ZL | Performed by: FAMILY MEDICINE

## 2017-07-26 PROCEDURE — 40000788 ZZHCL STATISTIC ESTIMATED AVERAGE GLUCOSE: Mod: ZL | Performed by: FAMILY MEDICINE

## 2017-07-27 ENCOUNTER — OFFICE VISIT (OUTPATIENT)
Dept: FAMILY MEDICINE | Facility: OTHER | Age: 77
End: 2017-07-27
Attending: FAMILY MEDICINE
Payer: MEDICARE

## 2017-07-27 VITALS
BODY MASS INDEX: 28.77 KG/M2 | WEIGHT: 165 LBS | OXYGEN SATURATION: 96 % | DIASTOLIC BLOOD PRESSURE: 78 MMHG | HEART RATE: 70 BPM | RESPIRATION RATE: 19 BRPM | SYSTOLIC BLOOD PRESSURE: 128 MMHG

## 2017-07-27 DIAGNOSIS — R73.09 ELEVATED GLUCOSE: ICD-10-CM

## 2017-07-27 DIAGNOSIS — M62.838 MUSCLE SPASMS OF BOTH LOWER EXTREMITIES: Primary | ICD-10-CM

## 2017-07-27 DIAGNOSIS — F41.9 ANXIETY: ICD-10-CM

## 2017-07-27 DIAGNOSIS — M25.361 KNEE BUCKLING, RIGHT: ICD-10-CM

## 2017-07-27 DIAGNOSIS — E66.09 OBESITY DUE TO EXCESS CALORIES, UNSPECIFIED OBESITY SEVERITY: ICD-10-CM

## 2017-07-27 DIAGNOSIS — K21.9 GASTROESOPHAGEAL REFLUX DISEASE WITHOUT ESOPHAGITIS: ICD-10-CM

## 2017-07-27 DIAGNOSIS — E78.5 HYPERLIPIDEMIA LDL GOAL <100: ICD-10-CM

## 2017-07-27 PROCEDURE — 99212 OFFICE O/P EST SF 10 MIN: CPT

## 2017-07-27 PROCEDURE — 99214 OFFICE O/P EST MOD 30 MIN: CPT | Performed by: FAMILY MEDICINE

## 2017-07-27 RX ORDER — ROSUVASTATIN CALCIUM 20 MG/1
20 TABLET, COATED ORAL DAILY
Qty: 90 TABLET | Refills: 3 | Status: SHIPPED | OUTPATIENT
Start: 2017-07-27 | End: 2018-07-20

## 2017-07-27 RX ORDER — CYCLOBENZAPRINE HCL 10 MG
10 TABLET ORAL 3 TIMES DAILY PRN
Qty: 40 TABLET | Refills: 0 | Status: SHIPPED | OUTPATIENT
Start: 2017-07-27 | End: 2018-06-21

## 2017-07-27 ASSESSMENT — PAIN SCALES - GENERAL: PAINLEVEL: NO PAIN (0)

## 2017-07-27 NOTE — MR AVS SNAPSHOT
After Visit Summary   7/27/2017    Iris Ellis    MRN: [id number]           Patient Information     Date Of Birth          1940        Visit Information        Provider Department      7/27/2017 11:15 AM Anna Bolaños MD [facility]        Today's Diagnoses     Muscle spasms of both lower extremities    -  1    Hyperlipidemia LDL goal <100           Follow-ups after your visit        Follow-up notes from your care team     Return in about 3 months (around 10/27/2017) for medication check.      Your next 10 appointments already scheduled     Oct 26, 2017 10:45 AM CDT   (Arrive by 10:30 AM)   SHORT with Anna Bolaños MD   [facility] [place] ([facility] )    [address]  [place] [address]   [phone]              Who to contact     If you have questions or need follow up information about today's clinic visit or your schedule please contact [facility] [place] directly at [phone].  Normal or non-critical lab and imaging results will be communicated to you by Telnichart, letter or phone within 4 business days after the clinic has received the results. If you do not hear from us within 7 days, please contact the clinic through Telnichart or phone. If you have a critical or abnormal lab result, we will notify you by phone as soon as possible.  Submit refill requests through [employer]Swarm or call your pharmacy and they will forward the refill request to us. Please allow 3 business days for your refill to be completed.          Additional Information About Your Visit        MyChart Information     EcoSwarm gives you secure access to your electronic health record. If you see a primary care provider, you can also send messages to your care team and make appointments. If you have questions, please call your primary care clinic.  If you do not have a primary care provider, please call [phone] and they will assist you.        Care EveryWhere ID      This is your Care EveryWhere ID. This could be used by other organizations to access your [facility] medical records  [id number]        Your Vitals Were     Pulse Respirations Pulse Oximetry Breastfeeding? BMI (Body Mass Index)       70 19 96% No 28.77 kg/m2        Blood Pressure from Last 3 Encounters:   07/27/17 128/78   04/24/17 126/72   02/28/17 122/74    Weight from Last 3 Encounters:   07/27/17 165 lb (74.8 kg)   04/24/17 165 lb (74.8 kg)   02/28/17 168 lb (76.2 kg)              Today, you had the following     No orders found for display         Today's Medication Changes          These changes are accurate as of: 7/27/17 11:58 AM.  If you have any questions, ask your nurse or doctor.               Start taking these medicines.        Dose/Directions    cyclobenzaprine 10 MG tablet   Commonly known as:  FLEXERIL   Used for:  Muscle spasms of both lower extremities   Started by:  Anna Bolaños MD        Dose:  10 mg   Take 1 tablet (10 mg) by mouth 3 times daily as needed for muscle spasms   Quantity:  40 tablet   Refills:  0         These medicines have changed or have updated prescriptions.        Dose/Directions    rosuvastatin 20 MG tablet   Commonly known as:  CRESTOR   This may have changed:    - medication strength  - how much to take   Used for:  Hyperlipidemia LDL goal <100   Changed by:  Anna Bolaños MD        Dose:  20 mg   Take 1 tablet (20 mg) by mouth daily   Quantity:  90 tablet   Refills:  3            Where to get your medicines      Some of these will need a paper prescription and others can be bought over the counter.  Ask your nurse if you have questions.     Bring a paper prescription for each of these medications     cyclobenzaprine 10 MG tablet    rosuvastatin 20 MG tablet                Primary Care Provider Office Phone # Fax #    Anna Bolaños -[phone]       [facility] [place] [address]  [place] [address]        Equal Access to Services      ROHINI BARBOUR : Hadii aad ku angie Nguyen, waaxda luqadaha, qaybta kaalmada adeankit, jody luanin hayaajoi amador krystaldottie mosley . So [facility] [phone].    ATENCIÓN: Si habla español, tiene a ray disposición servicios gratuitos de asistencia lingüística. Llame al [phone].    We comply with applicable federal civil rights laws and [place] laws. We do not discriminate on the basis of race, color, national origin, age, disability sex, sexual orientation or gender identity.            Thank you!     Thank you for [facility]  for your care. Our goal is always to provide you with excellent care. Hearing back from our patients is one way we can continue to improve our services. Please take a few minutes to complete the written survey that you may receive in the mail after your visit with us. Thank you!             Your Updated Medication List - Protect others around you: Learn how to safely use, store and throw away your medicines at www.disposemymeds.org.          This list is accurate as of: 7/27/17 11:58 AM.  Always use your most recent med list.                   Brand Name Dispense Instructions for use Diagnosis    ACIDOPHILUS PO      Take  by mouth. 1 daily        ASPIRIN PO      Take 81 mg by mouth daily        CALCIUM 1200 PO      Take  by mouth. Take 1 daily        cyclobenzaprine 10 MG tablet    FLEXERIL    40 tablet    Take 1 tablet (10 mg) by mouth 3 times daily as needed for muscle spasms    Muscle spasms of both lower extremities       lisinopril 10 MG tablet    PRINIVIL/ZESTRIL    90 tablet    Take 1 tablet (10mg) by oral route every day    Benign essential hypertension       multivitamin Tabs tablet     90 each    Take 1 tablet by mouth daily    Benign essential hypertension       omeprazole 40 MG capsule    priLOSEC    90 capsule    Take 1 capsule (40 mg) by mouth daily Take 30-60 minutes before a meal.    Gastroesophageal reflux disease without esophagitis       rosuvastatin  20 MG tablet    CRESTOR    90 tablet    Take 1 tablet (20 mg) by mouth daily    Hyperlipidemia LDL goal <100       sertraline 25 MG tablet    ZOLOFT    90 tablet    Take 1 tablet (25 mg) by mouth daily Has been taking 25 mg daily    Anxiety

## 2017-07-27 NOTE — NURSING NOTE
"Chief Complaint   Patient presents with     Anxiety     Gastrophageal Reflux       Initial /78  Pulse 70  Resp 19  Wt 165 lb (74.8 kg)  SpO2 96%  Breastfeeding? No  BMI 28.77 kg/m2 Estimated body mass index is 28.77 kg/(m^2) as calculated from the following:    Height as of 1/19/15: 5' 3.5\" (1.613 m).    Weight as of this encounter: 165 lb (74.8 kg).  Medication Reconciliation: complete   Alba White      "

## 2017-07-28 NOTE — PROGRESS NOTES
[employer] [facility]    Iris Ellis, 77 year old, female presents with   Chief Complaint   Patient presents with     Anxiety     recheck medications, she feels this is working well.      Gastrophageal Reflux     resolved with daily Omeprozole     Musculoskeletal Problem     right knee weakness, intermittently feels as though this will give out. No pain at this time     Weight Problem     weight gain of 15 pounds over the last several months prior to starting sertraline. She would like somthing for weight loss     Leg cramping     bilateral for the last few years       PAST MEDICAL HISTORY:  Past Medical History:   Diagnosis Date     Blood glucose elevated      Cystic disease of ovaries      Family history of malignant neoplasm of gastrointestinal tract 04/25/2003    colon cancer in mother     Osteoarthritis 1/20011    Right hip LYNN, Dr Nelson     Osteopenia     Dexa 2004,2006,2009     Other abnormal blood chemistry 09/20/2012     psoriasis 04/21/2011     Pure hypercholesterolemia 11/22/1999     Symptomatic menopausal or female climacteric states 03/31/2003     Unspecified closed fracture of pelvis 09/20/2011     Unspecified essential hypertension 07/30/2006       PAST SURGICAL HISTORY:  Past Surgical History:   Procedure Laterality Date     APPENDECTOMY  1944     Benign positional vertigo       CHOLECYSTECTOMY  1979     colonoscopies  1998, 2003, 2007, 2009, 9/18/2014    Colonic polyps. Repeat colonoscopy 2013     Declines further mammograms  2009     Left superior and inferior pubic rami fractures  2006     ORTHOPEDIC SURGERY  2011    total right hip. Dr. Nelson     PACs, PVCs, SVT, bradycardia on Holter Moniter  2009    Dr. Pace     Urethral dilation  2003    Urethral stricture       SOCIAL HISTORY  Social History     Social History     Marital status:      Spouse name: N/A     Number of children: N/A     Years of education: N/A     Occupational History      Retired     Social History  Main Topics     Smoking status: Former Smoker     Types: Cigarettes     Smokeless tobacco: Never Used     Alcohol use Yes      Comment: rarely     Drug use: No     Sexual activity: Not on file     Other Topics Concern     Caffeine Concern Yes     coffee-16 oz daily     Parent/Sibling W/ Cabg, Mi Or Angioplasty Before 65f 55m? No     Social History Narrative       MEDICATIONS:  Prior to Admission medications    Medication Sig Start Date End Date Taking? Authorizing Provider   rosuvastatin (CRESTOR) 20 MG tablet Take 1 tablet (20 mg) by mouth daily 7/27/17  Yes Anna Bolaños MD   cyclobenzaprine (FLEXERIL) 10 MG tablet Take 1 tablet (10 mg) by mouth 3 times daily as needed for muscle spasms 7/27/17  Yes Anna Bolaños MD   naltrexone-bupropion (CONTRAVE) 8-90 MG per 12 hr tablet Take one tablet daily in the morning for 1 week, increase to 1 tablet twice a day for one week, increase to 2 tablets in the morning and one in the evening for 1 week, then take 2 tablets twice a day 7/27/17  Yes Anna Bolaños MD   lisinopril (PRINIVIL/ZESTRIL) 10 MG tablet Take 1 tablet (10mg) by oral route every day 4/24/17  Yes Anna Bolaños MD   sertraline (ZOLOFT) 25 MG tablet Take 1 tablet (25 mg) by mouth daily Has been taking 25 mg daily 4/24/17  Yes Anna Bolaños MD   multivitamin (OCUVITE) TABS tablet Take 1 tablet by mouth daily 4/24/17  Yes Anna Bolaños MD   omeprazole (PRILOSEC) 40 MG capsule Take 1 capsule (40 mg) by mouth daily Take 30-60 minutes before a meal. 4/24/17  Yes Anna Bolaños MD   ASPIRIN PO Take 81 mg by mouth daily   Yes Reported, Patient   Calcium Carbonate-Vit D-Min (CALCIUM 1200 PO) Take  by mouth. Take 1 daily   Yes Reported, Patient   Lactobacillus (ACIDOPHILUS PO) Take  by mouth. 1 daily   Yes Reported, Patient       ALLERGIES:     Allergies   Allergen Reactions     Acetaminophen Nausea     Lortab     Hydrocodone Bitartrate Nausea     Lortab     Naproxen Nausea     Naprosyn     Rofecoxib       Upsets stomach  Vioxx       ROS:  C: NEGATIVE for fever, chills, change in weight  I: NEGATIVE for worrisome rashes, moles or lesions  R: NEGATIVE for significant cough or SOB  CV: NEGATIVE for chest pain, palpitations or peripheral edema  GI: NEGATIVE for nausea, abdominal pain, heartburn, or change in bowel habits  M: NEGATIVE for significant arthralgias or myalgia  N: NEGATIVE for weakness, dizziness or paresthesias  P: NEGATIVE for changes in mood or affect    EXAM:  /78  Pulse 70  Resp 19  Wt 165 lb (74.8 kg)  SpO2 96%  Breastfeeding? No  BMI 28.77 kg/m2 Body mass index is 28.77 kg/(m^2).   GENERAL APPEARANCE: healthy, alert and no distress  RESP: lungs clear to auscultation - no rales, rhonchi or wheezes  CV: regular rates and rhythm, normal S1 S2, no S3 or S4 and no murmur, click or rub  MS: extremities normal- no gross deformities noted, no tenderness of the right knee, no synovitis  SKIN: no suspicious lesions or rashes  NEURO: Normal strength and tone, mentation intact and speech normal  PSYCH: mentation appears normal and affect normal/bright    LABS AND IMAGING:     Results for orders placed or performed in visit on 07/26/17   CBC with platelets   Result Value Ref Range    WBC 5.6 4.0 - 11.0 10e9/L    RBC Count 4.80 3.8 - 5.2 10e12/L    Hemoglobin 13.6 11.7 - 15.7 g/dL    Hematocrit 42.2 35.0 - 47.0 %    MCV 88 78 - 100 fl    MCH 28.3 26.5 - 33.0 pg    MCHC 32.2 31.5 - 36.5 g/dL    RDW 13.6 10.0 - 15.0 %    Platelet Count 372 150 - 450 10e9/L   Comprehensive metabolic panel   Result Value Ref Range    Sodium 140 133 - 144 mmol/L    Potassium 4.1 3.4 - 5.3 mmol/L    Chloride 108 94 - 109 mmol/L    Carbon Dioxide 26 20 - 32 mmol/L    Anion Gap 6 3 - 14 mmol/L    Glucose 101 (H) 70 - 99 mg/dL    Urea Nitrogen 11 7 - 30 mg/dL    Creatinine 0.73 0.52 - 1.04 mg/dL    GFR Estimate 77 >60 mL/min/1.7m2    GFR Estimate If Black >90   GFR Calc   >60 mL/min/1.7m2    Calcium 9.0 8.5 - 10.1  mg/dL    Bilirubin Total 0.5 0.2 - 1.3 mg/dL    Albumin 3.6 3.4 - 5.0 g/dL    Protein Total 7.3 6.8 - 8.8 g/dL    Alkaline Phosphatase 81 40 - 150 U/L    ALT 30 0 - 50 U/L    AST 21 0 - 45 U/L   Lipid Profile   Result Value Ref Range    Cholesterol 201 (H) <200 mg/dL    Triglycerides 136 <150 mg/dL    HDL Cholesterol 67 >49 mg/dL    LDL Cholesterol Calculated 107 (H) <100 mg/dL    Non HDL Cholesterol 134 (H) <130 mg/dL   Hemoglobin A1c   Result Value Ref Range    Hemoglobin A1C 6.2 (H) 4.3 - 6.0 %   Estimated Average Glucose   Result Value Ref Range    Estimated Average Glucose 131 mg/dL         ASSESSMENT/PLAN:  (M62.838) Muscle spasms of both lower extremities  (primary encounter diagnosis)  Comment:   Plan: cyclobenzaprine (FLEXERIL) 10 MG tablet        Will try this at night prn    (E78.5) Hyperlipidemia LDL goal <100  Comment:   Plan: rosuvastatin (CRESTOR) 20 MG tablet        Not controlled, will increase to 20 mg daily and recheck in 3 months    (E66.09) Obesity due to excess calories, unspecified obesity severity  Comment:   Plan: naltrexone-bupropion (CONTRAVE) 8-90 MG per 12         hr tablet        Will try contrave, discussed, recheck in 3 months    (M25.361) Knee buckling, right  Comment:   Plan: no pain. She doesn't want this further evaluated at this time    (K21.9) Gastroesophageal reflux disease without esophagitis  Comment:   Plan: improved with medication. Discussed going without and seeing how she does    (R73.09) Elevated glucose  Comment:   Plan: discussed diet     (F41.9) Anxiety  Comment:   Plan: radha Bolaños MD  July 27, 2017

## 2017-08-03 ENCOUNTER — TELEPHONE (OUTPATIENT)
Dept: FAMILY MEDICINE | Facility: OTHER | Age: 77
End: 2017-08-03

## 2017-08-03 NOTE — TELEPHONE ENCOUNTER
Letter came in the mail addressing her last office visit from 7/27/17, she is requesting a diet supplement to curb appetite to be sent over to [facility].

## 2017-08-16 DIAGNOSIS — E66.09 OBESITY DUE TO EXCESS CALORIES, UNSPECIFIED OBESITY SEVERITY: ICD-10-CM

## 2017-08-30 DIAGNOSIS — E66.09 OBESITY DUE TO EXCESS CALORIES, UNSPECIFIED OBESITY SEVERITY: ICD-10-CM

## 2017-08-31 ENCOUNTER — TELEPHONE (OUTPATIENT)
Dept: FAMILY MEDICINE | Facility: OTHER | Age: 77
End: 2017-08-31

## 2017-08-31 NOTE — TELEPHONE ENCOUNTER
8/31/2017 - unable to do the PA for this medication at this time.  Patient needs to come in for a weight check.  Unable to answer questions due to no current weight to compare to.  Insurance requires showing/maintaining at least a 5% weight loss from baseline weight from when medication was started.  No office visit charted since the start of medication.  Beatrice Bobby, [facility] Specialist.

## 2017-09-07 ENCOUNTER — TELEPHONE (OUTPATIENT)
Dept: FAMILY MEDICINE | Facility: OTHER | Age: 77
End: 2017-09-07

## 2017-09-07 NOTE — TELEPHONE ENCOUNTER
Called patient back in regards to the note that was dropped off in regards to a prescription was supposed to be sent to [facility] in your absence, I did see that Contrave was faxed over to [facility] on 8/30/17 by dr Miguel. I did just call the patient to clarify which medication she needed, and she is looking for the Contrave prescription so she stated she would call [facility] to see if they have it for her if not I told her to call us back.

## 2017-09-17 ENCOUNTER — MYC MEDICAL ADVICE (OUTPATIENT)
Dept: FAMILY MEDICINE | Facility: OTHER | Age: 77
End: 2017-09-17

## 2017-09-17 DIAGNOSIS — E66.09 OBESITY DUE TO EXCESS CALORIES, UNSPECIFIED OBESITY SEVERITY: ICD-10-CM

## 2017-11-20 ENCOUNTER — OFFICE VISIT (OUTPATIENT)
Dept: FAMILY MEDICINE | Facility: OTHER | Age: 77
End: 2017-11-20
Attending: FAMILY MEDICINE
Payer: MEDICARE

## 2017-11-20 VITALS
WEIGHT: 165 LBS | RESPIRATION RATE: 20 BRPM | HEART RATE: 93 BPM | BODY MASS INDEX: 28.77 KG/M2 | SYSTOLIC BLOOD PRESSURE: 104 MMHG | DIASTOLIC BLOOD PRESSURE: 78 MMHG | OXYGEN SATURATION: 97 %

## 2017-11-20 DIAGNOSIS — R53.83 FATIGUE, UNSPECIFIED TYPE: ICD-10-CM

## 2017-11-20 DIAGNOSIS — I10 BENIGN ESSENTIAL HYPERTENSION: ICD-10-CM

## 2017-11-20 DIAGNOSIS — M25.50 MULTIPLE JOINT PAIN: Primary | ICD-10-CM

## 2017-11-20 DIAGNOSIS — H81.10 BENIGN PAROXYSMAL POSITIONAL VERTIGO, UNSPECIFIED LATERALITY: ICD-10-CM

## 2017-11-20 DIAGNOSIS — E78.5 HYPERLIPIDEMIA LDL GOAL <100: ICD-10-CM

## 2017-11-20 DIAGNOSIS — R73.09 ELEVATED GLUCOSE: ICD-10-CM

## 2017-11-20 PROCEDURE — 99214 OFFICE O/P EST MOD 30 MIN: CPT | Performed by: FAMILY MEDICINE

## 2017-11-20 PROCEDURE — 99212 OFFICE O/P EST SF 10 MIN: CPT

## 2017-11-20 RX ORDER — LISINOPRIL 5 MG/1
TABLET ORAL
Qty: 90 TABLET | Refills: 1 | Status: SHIPPED | OUTPATIENT
Start: 2017-11-20 | End: 2018-06-21

## 2017-11-20 ASSESSMENT — PAIN SCALES - GENERAL: PAINLEVEL: NO PAIN (0)

## 2017-11-20 ASSESSMENT — PATIENT HEALTH QUESTIONNAIRE - PHQ9: SUM OF ALL RESPONSES TO PHQ QUESTIONS 1-9: 3

## 2017-11-20 NOTE — NURSING NOTE
"Chief Complaint   Patient presents with     Anxiety     stopped sertraline on her own a week or so ago     Musculoskeletal Problem     having all over joint pain     Flu Shot     unsure about getting it today       Initial /78  Pulse 93  Resp 20  Wt 165 lb (74.8 kg)  SpO2 97%  Breastfeeding? No  BMI 28.77 kg/m2 Estimated body mass index is 28.77 kg/(m^2) as calculated from the following:    Height as of 1/19/15: 5' 3.5\" (1.613 m).    Weight as of this encounter: 165 lb (74.8 kg).  Medication Reconciliation: complete\  Alba White      "

## 2017-11-20 NOTE — MR AVS SNAPSHOT
After Visit Summary   11/20/2017    Iris Ellis    MRN: [id number]           Patient Information     Date Of Birth          1940        Visit Information        Provider Department      11/20/2017 2:15 PM Anna Bolaños MD [facility] [place]        Today's Diagnoses     Multiple joint pain    -  1    Benign essential hypertension        Fatigue, unspecified type        Benign paroxysmal positional vertigo, unspecified laterality        Hyperlipidemia LDL goal <100        Elevated glucose           Follow-ups after your visit        Additional Services     RHEUMATOLOGY REFERRAL       Your provider has referred you to: [facility]    Please be aware that coverage of these services is subject to the terms and limitations of your health insurance plan.  Call member services at your health plan with any benefit or coverage questions.      Please bring the following with you to your appointment:    (1) Any X-Rays, CTs or MRIs which have been performed.  Contact the facility where they were done to arrange for  prior to your scheduled appointment.    (2) List of current medications   (3) This referral request   (4) Any documents/labs given to you for this referral                  Follow-up notes from your care team     Return in about 4 weeks (around 12/18/2017) for Lab Work.      Your next 10 appointments already scheduled     Dec 21, 2017 11:00 AM CST   (Arrive by 10:45 AM)   SHORT with MD [facility] [place] ([facility] )    [address] [place] [address] [phone]              Who to contact     If you have questions or need follow up information about today's clinic visit or your schedule please contact [place] [place] directly at [phone].  Normal or non-critical lab and imaging results will be communicated to you by MyChart, letter or phone within 4 business days after the clinic has received the  results. If you do not hear from us within 7 days, please contact the clinic through [address] or phone. If you have a critical or abnormal lab result, we will notify you by phone as soon as possible.  Submit refill requests through [address] or call your pharmacy and they will forward the refill request to us. Please allow 3 business days for your refill to be completed.          Additional Information About Your Visit        [employer] Information     [address] gives you secure access to your electronic health record. If you see a primary care provider, you can also send messages to your care team and make appointments. If you have questions, please call your primary care clinic.  If you do not have a primary care provider, please call [phone] and they will assist you.        Care EveryWhere ID     This is your Care EveryWhere ID. This could be used by other organizations to access your [place] medical records  [id number]        Your Vitals Were     Pulse Respirations Pulse Oximetry Breastfeeding? BMI (Body Mass Index)       93 20 97% No 28.77 kg/m2        Blood Pressure from Last 3 Encounters:   11/20/17 104/78   07/27/17 128/78   04/24/17 126/72    Weight from Last 3 Encounters:   11/20/17 165 lb (74.8 kg)   07/27/17 165 lb (74.8 kg)   04/24/17 165 lb (74.8 kg)              We Performed the Following     RHEUMATOLOGY REFERRAL          Today's Medication Changes          These changes are accurate as of: 11/20/17  2:33 PM.  If you have any questions, ask your nurse or doctor.               These medicines have changed or have updated prescriptions.        Dose/Directions    lisinopril 5 MG tablet   Commonly known as:  PRINIVIL/ZESTRIL   This may have changed:    - medication strength  - additional instructions   Used for:  Benign essential hypertension   Changed by:  Anna Bolaños MD        Take 1 tablet (5 mg) by oral route every day   Quantity:  90 tablet   Refills:  1            Where to get your medicines       These medications were sent to [facility] [place] [place] - [address]  [address] [place] [address]     Phone:  [phone]     lisinopril 5 MG tablet                Primary Care Provider Office Phone # Fax #    Anna Bolaños [phone]       [facility] [place] [address] [place] [address]        Equal Access to Services     MIC BARBOUR : Hadii aad ku hadasho Soomaali, waaxda luqadaha, qaybta kaalmada adeegyada, waxay idiin hayaan adeeg kharash la'mandyn . So [facility] [phone].    ATENCIÓN: Si habla espgloria, tiene a ray disposición servicios gratuitos de asistencia lingüística. [facility] [phone].    We comply with applicable federal civil rights laws and [place] laws. We do not discriminate on the basis of race, color, national origin, age, disability, sex, sexual orientation, or gender identity.            Thank you!     Thank you for choosing [facility]  [facility] your care. Our goal is always to provide you with excellent care. Hearing back from our patients is one way we can continue to improve our services. Please take a few minutes to complete the written survey that you may receive in the mail after your visit with us. Thank you!             Your Updated Medication List - Protect others around you: Learn how to safely use, store and throw away your medicines at www.disposemymeds.org.          This list is accurate as of: 11/20/17  2:33 PM.  Always use your most recent med list.                   Brand Name Dispense Instructions for use Diagnosis    ACIDOPHILUS PO      Take  by mouth. 1 daily        ASPIRIN PO      Take 81 mg by mouth daily        CALCIUM 1200 PO      Take  by mouth. Take 1 daily        cyclobenzaprine 10 MG tablet    FLEXERIL    40 tablet    Take 1 tablet (10 mg) by mouth 3 times daily as needed for muscle spasms    Muscle spasms of both lower extremities       lisinopril 5 MG tablet    PRINIVIL/ZESTRIL    90 tablet     Take 1 tablet (5 mg) by oral route every day    Benign essential hypertension       multivitamin Tabs tablet     90 each    Take 1 tablet by mouth daily    Benign essential hypertension       omeprazole 40 MG capsule    priLOSEC    90 capsule    Take 1 capsule (40 mg) by mouth daily Take 30-60 minutes before a meal.    Gastroesophageal reflux disease without esophagitis       rosuvastatin 20 MG tablet    CRESTOR    90 tablet    Take 1 tablet (20 mg) by mouth daily    Hyperlipidemia LDL goal <100

## 2017-11-21 NOTE — PROGRESS NOTES
[facility]    Iris Ellis, 77 year old, female presents with   Chief Complaint   Patient presents with     Anxiety     stopped sertraline on her own a week or so ago as she felt it wasn't helping. She was on 25 mg     Musculoskeletal Problem     having all over joint pain particularly in her hands and shoulders. She does have psoriasis affecting her left elbow. She is fatigued as well. She is having dizziness with turning her head in certain directions. She isn't interersted in an Eply maneuver as she feels this created symptoms in the past     Flu Shot     unsure about getting it today, discussed, she prefers not to proceed       PAST MEDICAL HISTORY:  Past Medical History:   Diagnosis Date     Blood glucose elevated      Cystic disease of ovaries      Family history of malignant neoplasm of gastrointestinal tract 04/25/2003    colon cancer in mother     Osteoarthritis 1/20011    Right hip [facility], Dr Nelson     Osteopenia     Dexa 2004,2006,2009     Other abnormal blood chemistry 09/20/2012     psoriasis 04/21/2011     Pure hypercholesterolemia 11/22/1999     Symptomatic menopausal or female climacteric states 03/31/2003     Unspecified closed fracture of pelvis 09/20/2011     Unspecified essential hypertension 07/30/2006       PAST SURGICAL HISTORY:  Past Surgical History:   Procedure Laterality Date     APPENDECTOMY  1944     Benign positional vertigo       CHOLECYSTECTOMY  1979     colonoscopies  1998, 2003, 2007, 2009, 9/18/2014    Colonic polyps. Repeat colonoscopy 2013     Declines further mammograms  2009     Left superior and inferior pubic rami fractures  2006     ORTHOPEDIC SURGERY  2011    total right hip. Dr. Nelson     PACs, PVCs, SVT, bradycardia on Holter Moniter  2009    Dr. Pace     Urethral dilation  2003    Urethral stricture       SOCIAL HISTORY  Social History     Social History     Marital status:      Spouse name: N/A     Number of children: N/A     Years  of education: N/A     Occupational History      Retired     Social History Main Topics     Smoking status: Former Smoker     Types: Cigarettes     Smokeless tobacco: Never Used     Alcohol use Yes      Comment: rarely     Drug use: No     Sexual activity: Not on file     Other Topics Concern     Caffeine Concern Yes     coffee-16 oz daily     Parent/Sibling W/ Cabg, Mi Or Angioplasty Before 65f 55m? No     Social History Narrative       MEDICATIONS:  Prior to Admission medications    Medication Sig Start Date End Date Taking? Authorizing Provider   lisinopril (PRINIVIL/ZESTRIL) 5 MG tablet Take 1 tablet (5 mg) by oral route every day 11/20/17  Yes Anna Bolaños MD   rosuvastatin (CRESTOR) 20 MG tablet Take 1 tablet (20 mg) by mouth daily 7/27/17  Yes Anna Bolaños MD   cyclobenzaprine (FLEXERIL) 10 MG tablet Take 1 tablet (10 mg) by mouth 3 times daily as needed for muscle spasms 7/27/17  Yes Anna Bolaños MD   multivitamin (OCUVITE) TABS tablet Take 1 tablet by mouth daily 4/24/17  Yes Anna Bolaños MD   omeprazole (PRILOSEC) 40 MG capsule Take 1 capsule (40 mg) by mouth daily Take 30-60 minutes before a meal. 4/24/17  Yes Anna Bolaños MD   ASPIRIN PO Take 81 mg by mouth daily   Yes Reported, Patient   Calcium Carbonate-Vit D-Min (CALCIUM 1200 PO) Take  by mouth. Take 1 daily   Yes Reported, Patient   Lactobacillus (ACIDOPHILUS PO) Take  by mouth. 1 daily   Yes Reported, Patient       ALLERGIES:     Allergies   Allergen Reactions     Acetaminophen Nausea     Lortab     Hydrocodone Bitartrate Nausea     Lortab     Naproxen Nausea     Naprosyn     Rofecoxib      Upsets stomach  Vioxx       ROS:  CONSTITUTIONAL:NEGATIVE for fever, chills, change in weight  RESP:NEGATIVE for significant cough or SOB  CV: NEGATIVE for chest pain, palpitations or peripheral edema  NEURO: NEGATIVE for weakness, dizziness or paresthesias  PSYCHIATRIC: NEGATIVE for changes in mood or affect    EXAM:  /78  Pulse 93  Resp  20  Wt 165 lb (74.8 kg)  SpO2 97%  Breastfeeding? No  BMI 28.77 kg/m2 Body mass index is 28.77 kg/(m^2).   GENERAL APPEARANCE: healthy, alert and no distress  RESP: lungs clear to auscultation - no rales, rhonchi or wheezes  CV: regular rates and rhythm, normal S1 S2, no S3 or S4 and no murmur, click or rub  MS: [place] nodules of the fingers bilaterally, no synovitis noted.   SKIN: no suspicious lesions or rashes  NEURO: Normal strength and tone, mentation intact and speech normal  PSYCH: mentation appears normal and worried    LABS AND IMAGING:           ASSESSMENT/PLAN:  (M25.50) Multiple joint pain  (primary encounter diagnosis)  Comment:   Plan: RHEUMATOLOGY REFERRAL, TSH with free T4 reflex,        Cyclic Citrullinated Peptide Antibody IgG,         Rheumatoid factor, ERIKA TITER: Laboratory         Miscellaneous Order        Will obtain labs to evaluate further. She would like to see rheumatology and referral is done given her history of psoriasis.     (I10) Benign essential hypertension  Comment:   Plan: lisinopril (PRINIVIL/ZESTRIL) 5 MG tablet, CBC         with platelets and differential, Comprehensive         metabolic panel        She will return fasting for labs. This is low today, will decrease her lisinopril to 5 mg daily and recheck in 4 weeks, sooner for concerns    (R53.83) Fatigue, unspecified type  Comment:   Plan: TSH with free T4 reflex        Check lab    (H81.10) Benign paroxysmal positional vertigo, unspecified laterality  Comment:   Plan: dizziness has been ongoing with position changes of her head    (E78.5) Hyperlipidemia LDL goal <100  Comment:   Plan: Lipid Profile        Fasting labs due    (R73.09) Elevated glucose  Comment:   Plan: Hemoglobin A1c        Check lab      Anna Bolaños MD  November 20, 2017

## 2017-12-20 DIAGNOSIS — I10 BENIGN ESSENTIAL HYPERTENSION: ICD-10-CM

## 2017-12-20 DIAGNOSIS — R53.83 FATIGUE, UNSPECIFIED TYPE: ICD-10-CM

## 2017-12-20 DIAGNOSIS — M25.50 MULTIPLE JOINT PAIN: ICD-10-CM

## 2017-12-20 DIAGNOSIS — E78.5 HYPERLIPIDEMIA LDL GOAL <100: ICD-10-CM

## 2017-12-20 DIAGNOSIS — R73.09 ELEVATED GLUCOSE: ICD-10-CM

## 2017-12-20 LAB
ALBUMIN SERPL-MCNC: 3.6 G/DL (ref 3.4–5)
ALP SERPL-CCNC: 83 U/L (ref 40–150)
ALT SERPL W P-5'-P-CCNC: 30 U/L (ref 0–50)
ANION GAP SERPL CALCULATED.3IONS-SCNC: 7 MMOL/L (ref 3–14)
AST SERPL W P-5'-P-CCNC: 22 U/L (ref 0–45)
BASOPHILS # BLD AUTO: 0.1 10E9/L (ref 0–0.2)
BASOPHILS NFR BLD AUTO: 1.2 %
BILIRUB SERPL-MCNC: 0.5 MG/DL (ref 0.2–1.3)
BUN SERPL-MCNC: 10 MG/DL (ref 7–30)
CALCIUM SERPL-MCNC: 8.8 MG/DL (ref 8.5–10.1)
CHLORIDE SERPL-SCNC: 107 MMOL/L (ref 94–109)
CHOLEST SERPL-MCNC: 173 MG/DL
CO2 SERPL-SCNC: 27 MMOL/L (ref 20–32)
CREAT SERPL-MCNC: 0.82 MG/DL (ref 0.52–1.04)
DIFFERENTIAL METHOD BLD: NORMAL
EOSINOPHIL # BLD AUTO: 0.3 10E9/L (ref 0–0.7)
EOSINOPHIL NFR BLD AUTO: 4.6 %
ERYTHROCYTE [DISTWIDTH] IN BLOOD BY AUTOMATED COUNT: 14.4 % (ref 10–15)
EST. AVERAGE GLUCOSE BLD GHB EST-MCNC: 126 MG/DL
GFR SERPL CREATININE-BSD FRML MDRD: 68 ML/MIN/1.7M2
GLUCOSE SERPL-MCNC: 97 MG/DL (ref 70–99)
HBA1C MFR BLD: 6 % (ref 4.3–6)
HCT VFR BLD AUTO: 41.2 % (ref 35–47)
HDLC SERPL-MCNC: 79 MG/DL
HGB BLD-MCNC: 13.5 G/DL (ref 11.7–15.7)
IMM GRANULOCYTES # BLD: 0 10E9/L (ref 0–0.4)
IMM GRANULOCYTES NFR BLD: 0.2 %
LDLC SERPL CALC-MCNC: 73 MG/DL
LYMPHOCYTES # BLD AUTO: 1.8 10E9/L (ref 0.8–5.3)
LYMPHOCYTES NFR BLD AUTO: 28.9 %
MCH RBC QN AUTO: 29 PG (ref 26.5–33)
MCHC RBC AUTO-ENTMCNC: 32.8 G/DL (ref 31.5–36.5)
MCV RBC AUTO: 88 FL (ref 78–100)
MISCELLANEOUS TEST: NORMAL
MONOCYTES # BLD AUTO: 0.5 10E9/L (ref 0–1.3)
MONOCYTES NFR BLD AUTO: 7.9 %
NEUTROPHILS # BLD AUTO: 3.5 10E9/L (ref 1.6–8.3)
NEUTROPHILS NFR BLD AUTO: 57.2 %
NONHDLC SERPL-MCNC: 94 MG/DL
NRBC # BLD AUTO: 0 10*3/UL
NRBC BLD AUTO-RTO: 0 /100
PLATELET # BLD AUTO: 407 10E9/L (ref 150–450)
POTASSIUM SERPL-SCNC: 4.3 MMOL/L (ref 3.4–5.3)
PROT SERPL-MCNC: 7.5 G/DL (ref 6.8–8.8)
RBC # BLD AUTO: 4.66 10E12/L (ref 3.8–5.2)
SODIUM SERPL-SCNC: 141 MMOL/L (ref 133–144)
TRIGL SERPL-MCNC: 106 MG/DL
TSH SERPL DL<=0.005 MIU/L-ACNC: 2.02 MU/L (ref 0.4–4)
WBC # BLD AUTO: 6.1 10E9/L (ref 4–11)

## 2017-12-20 PROCEDURE — 84443 ASSAY THYROID STIM HORMONE: CPT | Mod: ZL | Performed by: FAMILY MEDICINE

## 2017-12-20 PROCEDURE — 86039 ANTINUCLEAR ANTIBODIES (ANA): CPT | Mod: ZL | Performed by: FAMILY MEDICINE

## 2017-12-20 PROCEDURE — 84999 UNLISTED CHEMISTRY PROCEDURE: CPT | Mod: ZL | Performed by: FAMILY MEDICINE

## 2017-12-20 PROCEDURE — 36415 COLL VENOUS BLD VENIPUNCTURE: CPT | Mod: ZL | Performed by: FAMILY MEDICINE

## 2017-12-20 PROCEDURE — 86431 RHEUMATOID FACTOR QUANT: CPT | Mod: ZL | Performed by: FAMILY MEDICINE

## 2017-12-20 PROCEDURE — 40000788 ZZHCL STATISTIC ESTIMATED AVERAGE GLUCOSE: Mod: ZL | Performed by: FAMILY MEDICINE

## 2017-12-20 PROCEDURE — 80053 COMPREHEN METABOLIC PANEL: CPT | Mod: ZL | Performed by: FAMILY MEDICINE

## 2017-12-20 PROCEDURE — 83036 HEMOGLOBIN GLYCOSYLATED A1C: CPT | Mod: ZL | Performed by: FAMILY MEDICINE

## 2017-12-20 PROCEDURE — 80061 LIPID PANEL: CPT | Mod: ZL | Performed by: FAMILY MEDICINE

## 2017-12-20 PROCEDURE — 86200 CCP ANTIBODY: CPT | Mod: ZL | Performed by: FAMILY MEDICINE

## 2017-12-20 PROCEDURE — 85025 COMPLETE CBC W/AUTO DIFF WBC: CPT | Mod: ZL | Performed by: FAMILY MEDICINE

## 2017-12-21 ENCOUNTER — OFFICE VISIT (OUTPATIENT)
Dept: FAMILY MEDICINE | Facility: OTHER | Age: 77
End: 2017-12-21
Attending: FAMILY MEDICINE
Payer: MEDICARE

## 2017-12-21 VITALS
OXYGEN SATURATION: 98 % | WEIGHT: 165 LBS | HEART RATE: 82 BPM | RESPIRATION RATE: 18 BRPM | HEIGHT: 64 IN | TEMPERATURE: 96.7 F | SYSTOLIC BLOOD PRESSURE: 122 MMHG | DIASTOLIC BLOOD PRESSURE: 68 MMHG | BODY MASS INDEX: 28.17 KG/M2

## 2017-12-21 DIAGNOSIS — G47.62 SLEEP RELATED LEG CRAMPS: ICD-10-CM

## 2017-12-21 DIAGNOSIS — R10.13 ABDOMINAL PAIN, EPIGASTRIC: ICD-10-CM

## 2017-12-21 DIAGNOSIS — I10 BENIGN ESSENTIAL HYPERTENSION: Primary | ICD-10-CM

## 2017-12-21 DIAGNOSIS — M25.50 MULTIPLE JOINT PAIN: ICD-10-CM

## 2017-12-21 LAB
CCP AB SER IA-ACNC: 1 U/ML
RHEUMATOID FACT SER NEPH-ACNC: <20 IU/ML (ref 0–20)

## 2017-12-21 PROCEDURE — 99214 OFFICE O/P EST MOD 30 MIN: CPT | Performed by: FAMILY MEDICINE

## 2017-12-21 PROCEDURE — 99212 OFFICE O/P EST SF 10 MIN: CPT

## 2017-12-21 ASSESSMENT — ANXIETY QUESTIONNAIRES
5. BEING SO RESTLESS THAT IT IS HARD TO SIT STILL: NOT AT ALL
7. FEELING AFRAID AS IF SOMETHING AWFUL MIGHT HAPPEN: SEVERAL DAYS
6. BECOMING EASILY ANNOYED OR IRRITABLE: NOT AT ALL
1. FEELING NERVOUS, ANXIOUS, OR ON EDGE: NOT AT ALL
2. NOT BEING ABLE TO STOP OR CONTROL WORRYING: NOT AT ALL
3. WORRYING TOO MUCH ABOUT DIFFERENT THINGS: NOT AT ALL
GAD7 TOTAL SCORE: 1
IF YOU CHECKED OFF ANY PROBLEMS ON THIS QUESTIONNAIRE, HOW DIFFICULT HAVE THESE PROBLEMS MADE IT FOR YOU TO DO YOUR WORK, TAKE CARE OF THINGS AT HOME, OR GET ALONG WITH OTHER PEOPLE: NOT DIFFICULT AT ALL

## 2017-12-21 ASSESSMENT — PAIN SCALES - GENERAL: PAINLEVEL: NO PAIN (0)

## 2017-12-21 ASSESSMENT — PATIENT HEALTH QUESTIONNAIRE - PHQ9
SUM OF ALL RESPONSES TO PHQ QUESTIONS 1-9: 1
5. POOR APPETITE OR OVEREATING: NOT AT ALL

## 2017-12-21 NOTE — NURSING NOTE
"Chief Complaint   Patient presents with     Hypertension       Initial /70 (BP Location: Right arm, Patient Position: Sitting, Cuff Size: Adult Regular)  Pulse 82  Temp 96.7  F (35.9  C) (Tympanic)  Resp 18  Ht 5' 3.5\" (1.613 m)  Wt 165 lb (74.8 kg)  SpO2 98%  BMI 28.77 kg/m2 Estimated body mass index is 28.77 kg/(m^2) as calculated from the following:    Height as of this encounter: 5' 3.5\" (1.613 m).    Weight as of this encounter: 165 lb (74.8 kg).  Medication Reconciliation: complete   Adilia Dunlap MA  "

## 2017-12-21 NOTE — MR AVS SNAPSHOT
After Visit Summary   12/21/2017    Iris Ellis    MRN: [id number]           Patient Information     Date Of Birth          1940        Visit Information        Provider Department      12/21/2017 11:00 AM Anna Bolaños MD [facility]        Today's Diagnoses     Benign essential hypertension    -  1       Follow-ups after your visit        Follow-up notes from your care team     Return in about 6 months (around 6/21/2018) for BP Recheck.      Your next 10 appointments already scheduled     Jun 21, 2018 11:00 AM CDT   (Arrive by 10:45 AM)   SHORT with Anna Bolaños MD   [facility] ([facility] [place] )    [address]  [place] [address]   [phone]              Who to contact     If you have questions or need follow up information about today's clinic visit or your schedule please contact [facility] directly at [phone].  Normal or non-critical lab and imaging results will be communicated to you by MyChart, letter or phone within 4 business days after the clinic has received the results. If you do not hear from us within 7 days, please contact the clinic through [employer] or phone. If you have a critical or abnormal lab result, we will notify you by phone as soon as possible.  Submit refill requests through Budge or call your pharmacy and they will forward the refill request to us. Please allow 3 business days for your refill to be completed.          Additional Information About Your Visit        MyChart Information     Budge gives you secure access to your electronic health record. If you see a primary care provider, you can also send messages to your care team and make appointments. If you have questions, please call your primary care clinic.  If you do not have a primary care provider, please call [phone] and they will assist you.        Care EveryWhere ID     This is your Care EveryWhere ID. This could be  "used by other organizations to access your [place] medical records  [id number]        Your Vitals Were     Pulse Temperature Respirations Height Pulse Oximetry BMI (Body Mass Index)    82 96.7  F (35.9  C) (Tympanic) 18 5' 3.5\" (1.613 m) 98% 28.77 kg/m2       Blood Pressure from Last 3 Encounters:   12/21/17 122/68   11/20/17 104/78   07/27/17 128/78    Weight from Last 3 Encounters:   12/21/17 165 lb (74.8 kg)   11/20/17 165 lb (74.8 kg)   07/27/17 165 lb (74.8 kg)              Today, you had the following     No orders found for display       Primary Care Provider Office Phone # Fax #    Anna Bolaños [phone]       [facility] [place] [address]  [place] [address]        Equal Access to Services     [facility] : Hadii aad ku hadasho Soomaali, waaxda luqadaha, qaybta kaalmada adeegyada, waxay luanin haymandyn perry mosley . So [facility] [phone].    ATENCIÓN: Si habla español, tiene a ray disposición servicios gratuitos de asistencia lingüística. Dina al [phone].    We comply with applicable federal civil rights laws and [place] laws. We do not discriminate on the basis of race, color, national origin, age, disability, sex, sexual orientation, or gender identity.            Thank you!     Thank you for choosing [facility]  for your care. Our goal is always to provide you with excellent care. Hearing back from our patients is one way we can continue to improve our services. Please take a few minutes to complete the written survey that you may receive in the mail after your visit with us. Thank you!             Your Updated Medication List - Protect others around you: Learn how to safely use, store and throw away your medicines at www.disposemymeds.org.          This list is accurate as of: 12/21/17 12:10 PM.  Always use your most recent med list.                   Brand Name Dispense Instructions for use Diagnosis    ACIDOPHILUS PO      Take  by mouth. 1 " daily        ASPIRIN PO      Take 81 mg by mouth daily        CALCIUM 1200 PO      Take  by mouth. Take 1 daily        cyclobenzaprine 10 MG tablet    FLEXERIL    40 tablet    Take 1 tablet (10 mg) by mouth 3 times daily as needed for muscle spasms    Muscle spasms of both lower extremities       lisinopril 5 MG tablet    PRINIVIL/ZESTRIL    90 tablet    Take 1 tablet (5 mg) by oral route every day    Benign essential hypertension       multivitamin Tabs tablet     90 each    Take 1 tablet by mouth daily    Benign essential hypertension       omeprazole 40 MG capsule    priLOSEC    90 capsule    Take 1 capsule (40 mg) by mouth daily Take 30-60 minutes before a meal.    Gastroesophageal reflux disease without esophagitis       rosuvastatin 20 MG tablet    CRESTOR    90 tablet    Take 1 tablet (20 mg) by mouth daily    Hyperlipidemia LDL goal <100

## 2017-12-22 LAB
RESULT: ABNORMAL
SEND OUTS MISC TEST CODE: ABNORMAL
SEND OUTS MISC TEST SPECIMEN: ABNORMAL
TEST NAME: ABNORMAL

## 2017-12-22 ASSESSMENT — ANXIETY QUESTIONNAIRES: GAD7 TOTAL SCORE: 1

## 2017-12-22 NOTE — PROGRESS NOTES
[facility]    Iris Ellis, 77 year old, female presents with   Chief Complaint   Patient presents with     Hypertension     we had decreased her Lisinopril to 5 mg daily from 10 mg as her BP was running low     Musculoskeletal Problem     bilateral leg spasm at night, particularly the hamstrings. She has tried medication which hasn't been successful.      Abdominal Pain     epigastric, intermittent, aching. This doesn't occur with activity or in relation to meals consistently. She has had a cholecystectomy. She is concerned about her heart and had a negative stress test January 30, 2017. She doesn't have heartburn, melana, hematochezia, or emesis. No constipation or change in bowel habits     Arthritis     onging aching pain. She has an appointment with Rheumatology in January       PAST MEDICAL HISTORY:  Past Medical History:   Diagnosis Date     Blood glucose elevated      Cystic disease of ovaries      Family history of malignant neoplasm of gastrointestinal tract 04/25/2003    colon cancer in mother     Osteoarthritis 1/20011    Right hip LYNN, Dr Nelson     Osteopenia     Dexa 2004,2006,2009     Other abnormal blood chemistry 09/20/2012     psoriasis 04/21/2011     Pure hypercholesterolemia 11/22/1999     Symptomatic menopausal or female climacteric states 03/31/2003     Unspecified closed fracture of pelvis 09/20/2011     Unspecified essential hypertension 07/30/2006       PAST SURGICAL HISTORY:  Past Surgical History:   Procedure Laterality Date     APPENDECTOMY  1944     Benign positional vertigo       CHOLECYSTECTOMY  1979     colonoscopies  1998, 2003, 2007, 2009, 9/18/2014    Colonic polyps. Repeat colonoscopy 2013     Declines further mammograms  2009     Left superior and inferior pubic rami fractures  2006     ORTHOPEDIC SURGERY  2011    total right hip. Dr. Nelson     PACs, PVCs, SVT, bradycardia on Holter Moniter  2009    Dr. Pace     Urethral dilation  2003    Urethral  stricture       SOCIAL HISTORY  Social History     Social History     Marital status:      Spouse name: N/A     Number of children: N/A     Years of education: N/A     Occupational History      Retired     Social History Main Topics     Smoking status: Former Smoker     Types: Cigarettes     Smokeless tobacco: Never Used     Alcohol use Yes      Comment: rarely     Drug use: No     Sexual activity: Not on file     Other Topics Concern     Caffeine Concern Yes     coffee-16 oz daily     Parent/Sibling W/ Cabg, Mi Or Angioplasty Before 65f 55m? No     Social History Narrative       MEDICATIONS:  Prior to Admission medications    Medication Sig Start Date End Date Taking? Authorizing Provider   lisinopril (PRINIVIL/ZESTRIL) 5 MG tablet Take 1 tablet (5 mg) by oral route every day 11/20/17  Yes Anna Bolaños MD   rosuvastatin (CRESTOR) 20 MG tablet Take 1 tablet (20 mg) by mouth daily 7/27/17  Yes Anna Bolaños MD   multivitamin (OCUVITE) TABS tablet Take 1 tablet by mouth daily 4/24/17  Yes Anna Bolaños MD   omeprazole (PRILOSEC) 40 MG capsule Take 1 capsule (40 mg) by mouth daily Take 30-60 minutes before a meal. 4/24/17  Yes Anna Bolaños MD   ASPIRIN PO Take 81 mg by mouth daily   Yes Reported, Patient   Calcium Carbonate-Vit D-Min (CALCIUM 1200 PO) Take  by mouth. Take 1 daily   Yes Reported, Patient   cyclobenzaprine (FLEXERIL) 10 MG tablet Take 1 tablet (10 mg) by mouth 3 times daily as needed for muscle spasms  Patient not taking: Reported on 12/21/2017 7/27/17   Anna Bolaños MD   Lactobacillus (ACIDOPHILUS PO) Take  by mouth. 1 daily    Reported, Patient       ALLERGIES:     Allergies   Allergen Reactions     Acetaminophen Nausea     Lortab     Hydrocodone Bitartrate Nausea     Lortab     Naproxen Nausea     Naprosyn     Rofecoxib      Upsets stomach  Vioxx       ROS:  CONSTITUTIONAL:NEGATIVE for fever, chills, change in weight  RESP:NEGATIVE for significant cough or SOB  CV: NEGATIVE for  "chest pain, palpitations or peripheral edema  GI: NEGATIVE for nausea, abdominal pain, heartburn, or change in bowel habits  NEURO: NEGATIVE for weakness, dizziness or paresthesias  PSYCHIATRIC: NEGATIVE for changes in mood or affect    EXAM:  /68  Pulse 82  Temp 96.7  F (35.9  C) (Tympanic)  Resp 18  Ht 5' 3.5\" (1.613 m)  Wt 165 lb (74.8 kg)  SpO2 98%  BMI 28.77 kg/m2 Body mass index is 28.77 kg/(m^2).   GENERAL APPEARANCE: healthy, alert and no distress  NECK: no adenopathy, no asymmetry, masses, or scars and thyroid normal to palpation  RESP: lungs clear to auscultation - no rales, rhonchi or wheezes  CV: regular rates and rhythm, normal S1 S2, no S3 or S4 and no murmur, click or rub  LYMPHATICS: normal ant/post cervical and supraclavicular nodes  ABDOMEN: soft, nontender, without hepatosplenomegaly or masses and bowel sounds normal  MS: extremities normal- no gross deformities noted and mild synovitis of MCP joints of left hand  NEURO: Normal strength and tone, mentation intact and speech normal  PSYCH: mentation appears normal and affect normal/bright    LABS AND IMAGING:     Results for orders placed or performed in visit on 12/20/17   TSH with free T4 reflex   Result Value Ref Range    TSH 2.02 0.40 - 4.00 mU/L   Cyclic Citrullinated Peptide Antibody IgG   Result Value Ref Range    Cyclic Citrullinated Peptide Antibody, IgG 1 <7 U/mL   Rheumatoid factor   Result Value Ref Range    Rheumatoid Factor <20 <20 IU/mL   ERIKA TITER: Laboratory Miscellaneous Order   Result Value Ref Range    Miscellaneous Test         Specimen Received, Reordered and sent to Performing laboratory - Report to follow upon   completion.     Hemoglobin A1c   Result Value Ref Range    Hemoglobin A1C 6.0 4.3 - 6.0 %   Lipid Profile   Result Value Ref Range    Cholesterol 173 <200 mg/dL    Triglycerides 106 <150 mg/dL    HDL Cholesterol 79 >49 mg/dL    LDL Cholesterol Calculated 73 <100 mg/dL    Non HDL Cholesterol 94 <130 " mg/dL   CBC with platelets and differential   Result Value Ref Range    WBC 6.1 4.0 - 11.0 10e9/L    RBC Count 4.66 3.8 - 5.2 10e12/L    Hemoglobin 13.5 11.7 - 15.7 g/dL    Hematocrit 41.2 35.0 - 47.0 %    MCV 88 78 - 100 fl    MCH 29.0 26.5 - 33.0 pg    MCHC 32.8 31.5 - 36.5 g/dL    RDW 14.4 10.0 - 15.0 %    Platelet Count 407 150 - 450 10e9/L    Diff Method Automated Method     % Neutrophils 57.2 %    % Lymphocytes 28.9 %    % Monocytes 7.9 %    % Eosinophils 4.6 %    % Basophils 1.2 %    % Immature Granulocytes 0.2 %    Nucleated RBCs 0 0 /100    Absolute Neutrophil 3.5 1.6 - 8.3 10e9/L    Absolute Lymphocytes 1.8 0.8 - 5.3 10e9/L    Absolute Monocytes 0.5 0.0 - 1.3 10e9/L    Absolute Eosinophils 0.3 0.0 - 0.7 10e9/L    Absolute Basophils 0.1 0.0 - 0.2 10e9/L    Abs Immature Granulocytes 0.0 0 - 0.4 10e9/L    Absolute Nucleated RBC 0.0    Comprehensive metabolic panel   Result Value Ref Range    Sodium 141 133 - 144 mmol/L    Potassium 4.3 3.4 - 5.3 mmol/L    Chloride 107 94 - 109 mmol/L    Carbon Dioxide 27 20 - 32 mmol/L    Anion Gap 7 3 - 14 mmol/L    Glucose 97 70 - 99 mg/dL    Urea Nitrogen 10 7 - 30 mg/dL    Creatinine 0.82 0.52 - 1.04 mg/dL    GFR Estimate 68 >60 mL/min/1.7m2    GFR Estimate If Black 82 >60 mL/min/1.7m2    Calcium 8.8 8.5 - 10.1 mg/dL    Bilirubin Total 0.5 0.2 - 1.3 mg/dL    Albumin 3.6 3.4 - 5.0 g/dL    Protein Total 7.5 6.8 - 8.8 g/dL    Alkaline Phosphatase 83 40 - 150 U/L    ALT 30 0 - 50 U/L    AST 22 0 - 45 U/L   Estimated Average Glucose   Result Value Ref Range    Estimated Average Glucose 126 mg/dL         ASSESSMENT/PLAN:  (I10) Benign essential hypertension  (primary encounter diagnosis)  Comment:   Plan: stable with lower dose of linsinopril. Will continue this dose and recheck in 6 months, sooner for concerns    (G47.62) Sleep related leg cramps  Comment:   Plan: discussed options. She notes that she had similar symptoms after delivering her son Vladimir and these resolved  with a vitamin. Discussed that iron and vitamin C can be helpful. She will try a prenatal vitamin with extra iron and vitamin C    (R10.13) Abdominal pain, epigastric  Comment:   Plan: possible intermittent gastritis. Discussed that this isn't cardiac. She is taking Prilosec already and prefers to continue this. Discussed dietary changes to make    (M25.50) Multiple joint pain  Comment:   Plan: testing negative for RA. Rheumatology to evaluate      Anna Bolaños MD  December 22, 2017

## 2017-12-26 ENCOUNTER — TELEPHONE (OUTPATIENT)
Dept: FAMILY MEDICINE | Facility: OTHER | Age: 77
End: 2017-12-26

## 2018-06-18 ENCOUNTER — RADIANT APPOINTMENT (OUTPATIENT)
Dept: GENERAL RADIOLOGY | Facility: OTHER | Age: 78
End: 2018-06-18
Attending: FAMILY MEDICINE
Payer: MEDICARE

## 2018-06-18 DIAGNOSIS — M25.552 HIP PAIN, LEFT: ICD-10-CM

## 2018-06-18 DIAGNOSIS — M25.562 ACUTE PAIN OF LEFT KNEE: ICD-10-CM

## 2018-06-18 PROCEDURE — 73502 X-RAY EXAM HIP UNI 2-3 VIEWS: CPT | Mod: TC

## 2018-06-18 PROCEDURE — 73562 X-RAY EXAM OF KNEE 3: CPT | Mod: TC,LT

## 2018-06-21 ENCOUNTER — OFFICE VISIT (OUTPATIENT)
Dept: FAMILY MEDICINE | Facility: OTHER | Age: 78
End: 2018-06-21
Attending: FAMILY MEDICINE
Payer: MEDICARE

## 2018-06-21 VITALS
OXYGEN SATURATION: 96 % | BODY MASS INDEX: 28.17 KG/M2 | TEMPERATURE: 97.6 F | HEIGHT: 64 IN | RESPIRATION RATE: 18 BRPM | DIASTOLIC BLOOD PRESSURE: 60 MMHG | WEIGHT: 165 LBS | HEART RATE: 69 BPM | SYSTOLIC BLOOD PRESSURE: 122 MMHG

## 2018-06-21 DIAGNOSIS — I10 BENIGN ESSENTIAL HYPERTENSION: Primary | ICD-10-CM

## 2018-06-21 DIAGNOSIS — G89.29 CHRONIC PAIN OF LEFT KNEE: ICD-10-CM

## 2018-06-21 DIAGNOSIS — M25.562 CHRONIC PAIN OF LEFT KNEE: ICD-10-CM

## 2018-06-21 DIAGNOSIS — M25.552 HIP PAIN, LEFT: ICD-10-CM

## 2018-06-21 PROCEDURE — G0463 HOSPITAL OUTPT CLINIC VISIT: HCPCS

## 2018-06-21 PROCEDURE — 99213 OFFICE O/P EST LOW 20 MIN: CPT | Performed by: FAMILY MEDICINE

## 2018-06-21 RX ORDER — LISINOPRIL 5 MG/1
TABLET ORAL
Qty: 90 TABLET | Refills: 3 | Status: SHIPPED | OUTPATIENT
Start: 2018-06-21 | End: 2019-08-21

## 2018-06-21 ASSESSMENT — ANXIETY QUESTIONNAIRES
7. FEELING AFRAID AS IF SOMETHING AWFUL MIGHT HAPPEN: SEVERAL DAYS
GAD7 TOTAL SCORE: 5
IF YOU CHECKED OFF ANY PROBLEMS ON THIS QUESTIONNAIRE, HOW DIFFICULT HAVE THESE PROBLEMS MADE IT FOR YOU TO DO YOUR WORK, TAKE CARE OF THINGS AT HOME, OR GET ALONG WITH OTHER PEOPLE: NOT DIFFICULT AT ALL
6. BECOMING EASILY ANNOYED OR IRRITABLE: SEVERAL DAYS
3. WORRYING TOO MUCH ABOUT DIFFERENT THINGS: SEVERAL DAYS
5. BEING SO RESTLESS THAT IT IS HARD TO SIT STILL: NOT AT ALL
2. NOT BEING ABLE TO STOP OR CONTROL WORRYING: SEVERAL DAYS
1. FEELING NERVOUS, ANXIOUS, OR ON EDGE: SEVERAL DAYS
4. TROUBLE RELAXING: NOT AT ALL

## 2018-06-21 ASSESSMENT — PAIN SCALES - GENERAL: PAINLEVEL: MILD PAIN (3)

## 2018-06-21 NOTE — PROGRESS NOTES
SUBJECTIVE:   Iris Ellis is a 78 year old female who presents to clinic today for the following health issues:      Hypertension Follow-up      Outpatient blood pressures are being checked at home.     Low Salt Diet: not monitoring salt      Amount of exercise or physical activity: None, due to hip and knee pain    Problems taking medications regularly: No    Medication side effects: none    Diet: regular (no restrictions)        Chief Complaint   Patient presents with     Hypertension     Pain     she has had ongoing pain of the hip and knee, left that is awakening her at night. She has had her right hip replaced in the past. No trauma. Knee pain seems to be worse now.          Problem list and histories reviewed & adjusted, as indicated.  Additional history: as documented    Patient Active Problem List   Diagnosis     Osteopenia     Knee pain     Elevated LFTs     Sleep related leg cramps     Preventative health care     Elevated glucose     Mammogram declined     Advanced care planning/counseling discussion     Benign essential hypertension     Hyperlipidemia LDL goal <100     Anxiety     Past Surgical History:   Procedure Laterality Date     APPENDECTOMY  1944     Benign positional vertigo       CHOLECYSTECTOMY  1979     colonoscopies  1998, 2003, 2007, 2009, 9/18/2014    Colonic polyps. Repeat colonoscopy 2013     Declines further mammograms  2009     Left superior and inferior pubic rami fractures  2006     ORTHOPEDIC SURGERY  2011    total right hip. Dr. Nelson     PACs, PVCs, SVT, bradycardia on Holter Moniter  2009    Dr. Pace     Urethral dilation  2003    Urethral stricture       Social History   Substance Use Topics     Smoking status: Former Smoker     Types: Cigarettes     Smokeless tobacco: Never Used     Alcohol use Yes      Comment: rarely     Family History   Problem Relation Age of Onset     Alzheimer Disease Father      Cancer Sister      Colon     Cancer Brother      Lung,  "prostate, colon     Respiratory Brother      Diabetes Mother      Cancer Other      Colon-family h/o         Current Outpatient Prescriptions   Medication Sig Dispense Refill     lisinopril (PRINIVIL/ZESTRIL) 5 MG tablet Take 1 tablet (5 mg) by oral route every day 90 tablet 3     ASPIRIN PO Take 81 mg by mouth daily       Calcium Carbonate-Vit D-Min (CALCIUM 1200 PO) Take  by mouth. Take 1 daily       Lactobacillus (ACIDOPHILUS PO) Take  by mouth. 1 daily       multivitamin (OCUVITE) TABS tablet Take 1 tablet by mouth daily 90 each 3     rosuvastatin (CRESTOR) 20 MG tablet Take 1 tablet (20 mg) by mouth daily 90 tablet 3     [DISCONTINUED] lisinopril (PRINIVIL/ZESTRIL) 5 MG tablet Take 1 tablet (5 mg) by oral route every day 90 tablet 1     Allergies   Allergen Reactions     Acetaminophen Nausea     Lortab     Hydrocodone Bitartrate Nausea     Lortab     Naproxen Nausea     Naprosyn     Rofecoxib      Upsets stomach  Vioxx     BP Readings from Last 3 Encounters:   06/21/18 122/60   12/21/17 122/68   11/20/17 104/78    Wt Readings from Last 3 Encounters:   06/21/18 165 lb (74.8 kg)   12/21/17 165 lb (74.8 kg)   11/20/17 165 lb (74.8 kg)                    Reviewed and updated as needed this visit by clinical staff       Reviewed and updated as needed this visit by Provider         ROS:  Constitutional, HEENT, cardiovascular, pulmonary, gi and gu systems are negative, except as otherwise noted.    OBJECTIVE:                                                    /60 (BP Location: Left arm, Patient Position: Sitting, Cuff Size: Adult Regular)  Pulse 69  Temp 97.6  F (36.4  C) (Tympanic)  Resp 18  Ht 5' 3.5\" (1.613 m)  Wt 165 lb (74.8 kg)  SpO2 96%  BMI 28.77 kg/m2  Body mass index is 28.77 kg/(m^2).  GENERAL APPEARANCE: healthy, alert and no distress  NECK: no adenopathy, no asymmetry, masses, or scars and thyroid normal to palpation  RESP: lungs clear to auscultation - no rales, rhonchi or wheezes  CV: " regular rates and rhythm, normal S1 S2, no S3 or S4 and no murmur, click or rub  ORTHO: Hip Exam: Palpation: Tender:   Hip is nontender  Non-tender:  left greater trochanter, left gluteus medius, left iliac crest, left proximal hamstring attachment  Range of Motion:  Left Hip  flexion   decreased, painful, extension  full, pain-free, external rotation  decreased, painful, internal rotation  full, pain-free  Strength:  full strength      Knee Exam: Inspection: AP/lateral alignment normal, small effusion  Tender: medial patellar facet, medial joint line  Non-tender: lateral patellar facet, lateral joint line  Active Range of Motion: full flexion, decreased extension 60 degrees lacking  Strength: intact  Special tests: normal Valgus stress test, normal Varus      SKIN: no suspicious lesions or rashes  NEURO: Normal strength and tone, mentation intact and speech normal  PSYCH: mentation appears normal and affect normal/bright         ASSESSMENT/PLAN:                                                    1. Benign essential hypertension  Doing well on lower dose of lisinipril, 5 mg daily. Continue and recheck in 6 months, sooner for concerns  - lisinopril (PRINIVIL/ZESTRIL) 5 MG tablet; Take 1 tablet (5 mg) by oral route every day  Dispense: 90 tablet; Refill: 3    2. Hip pain, left  X rays show moderate DJD with spurring. This is most likely the cause of her pain  - ORTHOPEDICS ADULT REFERRAL    3. Chronic pain of left knee  Mild DJD at this time. Referral done  - ORTHOPEDICS ADULT REFERRAL          Anna Bolaños MD  [facility]

## 2018-06-21 NOTE — NURSING NOTE
"Chief Complaint   Patient presents with     Hypertension       Initial /60 (BP Location: Left arm, Patient Position: Sitting, Cuff Size: Adult Regular)  Pulse 69  Temp 97.6  F (36.4  C) (Tympanic)  Resp 18  Ht 5' 3.5\" (1.613 m)  Wt 165 lb (74.8 kg)  SpO2 96%  BMI 28.77 kg/m2 Estimated body mass index is 28.77 kg/(m^2) as calculated from the following:    Height as of this encounter: 5' 3.5\" (1.613 m).    Weight as of this encounter: 165 lb (74.8 kg).  Medication Reconciliation: complete    Naomi Pires LPN    "

## 2018-06-21 NOTE — MR AVS SNAPSHOT
After Visit Summary   6/21/2018    Iris Ellis    MRN: [id number]           Patient Information     Date Of Birth          1940        Visit Information        Provider Department      6/21/2018 11:00 AM Anna Bolaños MD [facility]        Today's Diagnoses     Benign essential hypertension    -  1    Hip pain, left        Chronic pain of left knee           Follow-ups after your visit        Additional Services     ORTHOPEDICS ADULT REFERRAL       Your provider has referred you to: Dr Nelson for left hip and knee pain    Please be aware that coverage of these services is subject to the terms and limitations of your health insurance plan.  Call member services at your health plan with any benefit or coverage questions.      Please bring the following to your appointment:    >>   Any x-rays, CTs or MRIs which have been performed.  Contact the facility where they were done to arrange for  prior to your scheduled appointment.    >>   List of current medications   >>   This referral request   >>   Any documents/labs given to you for this referral                  Follow-up notes from your care team     Return in about 6 months (around 12/21/2018) for Physical Exam, BP Recheck.      Who to contact     If you have questions or need follow up information about today's clinic visit or your schedule please contact [facility] directly at [phone].  Normal or non-critical lab and imaging results will be communicated to you by MyChart, letter or phone within 4 business days after the clinic has received the results. If you do not hear from us within 7 days, please contact the clinic through MyChart or phone. If you have a critical or abnormal lab result, we will notify you by phone as soon as possible.  Submit refill requests through [employer] or call your pharmacy and they will forward the refill request to us. Please allow 3 business days for your refill to  "be completed.          Additional Information About Your Visit        [employer] Information     [employer] gives you secure access to your electronic health record. If you see a primary care provider, you can also send messages to your care team and make appointments. If you have questions, please call your primary care clinic.  If you do not have a primary care provider, please call [phone] and they will assist you.        Care EveryWhere ID     This is your Care EveryWhere ID. This could be used by other organizations to access your [employer] medical records  [id number]        Your Vitals Were     Pulse Temperature Respirations Height Pulse Oximetry BMI (Body Mass Index)    69 97.6  F (36.4  C) (Tympanic) 18 5' 3.5\" (1.613 m) 96% 28.77 kg/m2       Blood Pressure from Last 3 Encounters:   06/21/18 122/60   12/21/17 122/68   11/20/17 104/78    Weight from Last 3 Encounters:   06/21/18 165 lb (74.8 kg)   12/21/17 165 lb (74.8 kg)   11/20/17 165 lb (74.8 kg)              We Performed the Following     ORTHOPEDICS ADULT REFERRAL          Where to get your medicines      These medications were sent to [facility] [address]  [address] [place] [address]     Phone:  [phone]     lisinopril 5 MG tablet          Primary Care Provider Office Phone # Fax #    Anna Bolaños [phone]       [address] [place] [address]        Equal Access to Services     [facility] AH: Hadii aad ku hadasho Soomaali, waaxda luqadaha, qaybta kaalmada adeegyada, jody harkins. So [facility] [phone].    ATENCIÓN: Si habla español, tiene a ray disposición servicios gratuitos de asistencia lingüística. Llame al [phone].    We comply with applicable federal civil rights laws and [place] laws. We do not discriminate on the basis of race, color, national origin, age, disability, sex, sexual orientation, or gender identity.            Thank " you!     [facility] you for [facility]  for your [facility]. Our goal is always to provide you with excellent care. Hearing back from our patients is one way we can continue to improve our services. Please take a few minutes to complete the written survey that you may receive in the mail after your visit with us. Thank you!             Your Updated Medication List - Protect others around you: Learn how to safely use, store and throw away your medicines at www.disposemymeds.org.          This list is accurate as of 6/21/18 11:48 AM.  Always use your most recent med list.                   Brand Name Dispense Instructions for use Diagnosis    ACIDOPHILUS PO      Take  by mouth. 1 daily        ASPIRIN PO      Take 81 mg by mouth daily        CALCIUM 1200 PO      Take  by mouth. Take 1 daily        lisinopril 5 MG tablet    PRINIVIL/ZESTRIL    90 tablet    Take 1 tablet (5 mg) by oral route every day    Benign essential hypertension       multivitamin Tabs tablet     90 each    Take 1 tablet by mouth daily    Benign essential hypertension       rosuvastatin 20 MG tablet    CRESTOR    90 tablet    Take 1 tablet (20 mg) by mouth daily    Hyperlipidemia LDL goal <100

## 2018-06-22 ASSESSMENT — PATIENT HEALTH QUESTIONNAIRE - PHQ9: SUM OF ALL RESPONSES TO PHQ QUESTIONS 1-9: 5

## 2018-06-22 ASSESSMENT — ANXIETY QUESTIONNAIRES: GAD7 TOTAL SCORE: 5

## 2018-07-10 ENCOUNTER — TRANSFERRED RECORDS (OUTPATIENT)
Dept: HEALTH INFORMATION MANAGEMENT | Facility: CLINIC | Age: 78
End: 2018-07-10

## 2018-07-20 ENCOUNTER — OFFICE VISIT (OUTPATIENT)
Dept: FAMILY MEDICINE | Facility: OTHER | Age: 78
End: 2018-07-20
Attending: FAMILY MEDICINE
Payer: MEDICARE

## 2018-07-20 VITALS
RESPIRATION RATE: 18 BRPM | BODY MASS INDEX: 28.68 KG/M2 | OXYGEN SATURATION: 96 % | SYSTOLIC BLOOD PRESSURE: 122 MMHG | HEART RATE: 79 BPM | WEIGHT: 168 LBS | DIASTOLIC BLOOD PRESSURE: 60 MMHG | TEMPERATURE: 97 F | HEIGHT: 64 IN

## 2018-07-20 DIAGNOSIS — G47.62 SLEEP RELATED LEG CRAMPS: ICD-10-CM

## 2018-07-20 DIAGNOSIS — R82.90 ABNORMAL URINE FINDING: ICD-10-CM

## 2018-07-20 DIAGNOSIS — I10 BENIGN ESSENTIAL HYPERTENSION: ICD-10-CM

## 2018-07-20 DIAGNOSIS — Z01.818 PREOP GENERAL PHYSICAL EXAM: Primary | ICD-10-CM

## 2018-07-20 DIAGNOSIS — M16.12 PRIMARY OSTEOARTHRITIS OF LEFT HIP: ICD-10-CM

## 2018-07-20 DIAGNOSIS — E78.5 HYPERLIPIDEMIA LDL GOAL <100: ICD-10-CM

## 2018-07-20 LAB
ALBUMIN SERPL-MCNC: 3.9 G/DL (ref 3.4–5)
ALBUMIN UR-MCNC: NEGATIVE MG/DL
ALP SERPL-CCNC: 77 U/L (ref 40–150)
ALT SERPL W P-5'-P-CCNC: 28 U/L (ref 0–50)
ANION GAP SERPL CALCULATED.3IONS-SCNC: 6 MMOL/L (ref 3–14)
APPEARANCE UR: CLEAR
AST SERPL W P-5'-P-CCNC: 20 U/L (ref 0–45)
BASOPHILS # BLD AUTO: 0.1 10E9/L (ref 0–0.2)
BASOPHILS NFR BLD AUTO: 0.7 %
BILIRUB SERPL-MCNC: 0.4 MG/DL (ref 0.2–1.3)
BILIRUB UR QL STRIP: NEGATIVE
BUN SERPL-MCNC: 16 MG/DL (ref 7–30)
CALCIUM SERPL-MCNC: 9.2 MG/DL (ref 8.5–10.1)
CHLORIDE SERPL-SCNC: 105 MMOL/L (ref 94–109)
CO2 SERPL-SCNC: 30 MMOL/L (ref 20–32)
COLOR UR AUTO: YELLOW
CREAT SERPL-MCNC: 0.7 MG/DL (ref 0.52–1.04)
DIFFERENTIAL METHOD BLD: NORMAL
EOSINOPHIL # BLD AUTO: 0.1 10E9/L (ref 0–0.7)
EOSINOPHIL NFR BLD AUTO: 1.8 %
ERYTHROCYTE [DISTWIDTH] IN BLOOD BY AUTOMATED COUNT: 13.2 % (ref 10–15)
GFR SERPL CREATININE-BSD FRML MDRD: 81 ML/MIN/1.7M2
GLUCOSE SERPL-MCNC: 94 MG/DL (ref 70–99)
GLUCOSE UR STRIP-MCNC: NEGATIVE MG/DL
HCT VFR BLD AUTO: 41.6 % (ref 35–47)
HGB BLD-MCNC: 13.6 G/DL (ref 11.7–15.7)
HGB UR QL STRIP: NEGATIVE
IMM GRANULOCYTES # BLD: 0 10E9/L (ref 0–0.4)
IMM GRANULOCYTES NFR BLD: 0.3 %
KETONES UR STRIP-MCNC: NEGATIVE MG/DL
LEUKOCYTE ESTERASE UR QL STRIP: ABNORMAL
LYMPHOCYTES # BLD AUTO: 2.3 10E9/L (ref 0.8–5.3)
LYMPHOCYTES NFR BLD AUTO: 31.5 %
MCH RBC QN AUTO: 29.3 PG (ref 26.5–33)
MCHC RBC AUTO-ENTMCNC: 32.7 G/DL (ref 31.5–36.5)
MCV RBC AUTO: 90 FL (ref 78–100)
MONOCYTES # BLD AUTO: 0.6 10E9/L (ref 0–1.3)
MONOCYTES NFR BLD AUTO: 8.3 %
NEUTROPHILS # BLD AUTO: 4.2 10E9/L (ref 1.6–8.3)
NEUTROPHILS NFR BLD AUTO: 57.4 %
NITRATE UR QL: NEGATIVE
NRBC # BLD AUTO: 0 10*3/UL
NRBC BLD AUTO-RTO: 0 /100
PH UR STRIP: 6.5 PH (ref 4.7–8)
PLATELET # BLD AUTO: 373 10E9/L (ref 150–450)
POTASSIUM SERPL-SCNC: 4.1 MMOL/L (ref 3.4–5.3)
PROT SERPL-MCNC: 7.5 G/DL (ref 6.8–8.8)
RBC # BLD AUTO: 4.64 10E12/L (ref 3.8–5.2)
SODIUM SERPL-SCNC: 141 MMOL/L (ref 133–144)
SOURCE: ABNORMAL
SP GR UR STRIP: 1.02 (ref 1–1.03)
UROBILINOGEN UR STRIP-MCNC: NORMAL MG/DL (ref 0–2)
WBC # BLD AUTO: 7.3 10E9/L (ref 4–11)

## 2018-07-20 PROCEDURE — G0463 HOSPITAL OUTPT CLINIC VISIT: HCPCS

## 2018-07-20 PROCEDURE — 93010 ELECTROCARDIOGRAM REPORT: CPT | Performed by: INTERNAL MEDICINE

## 2018-07-20 PROCEDURE — 80053 COMPREHEN METABOLIC PANEL: CPT | Mod: ZL | Performed by: FAMILY MEDICINE

## 2018-07-20 PROCEDURE — 81003 URINALYSIS AUTO W/O SCOPE: CPT | Mod: ZL | Performed by: FAMILY MEDICINE

## 2018-07-20 PROCEDURE — 99214 OFFICE O/P EST MOD 30 MIN: CPT | Mod: 25 | Performed by: FAMILY MEDICINE

## 2018-07-20 PROCEDURE — 85025 COMPLETE CBC W/AUTO DIFF WBC: CPT | Mod: ZL | Performed by: FAMILY MEDICINE

## 2018-07-20 PROCEDURE — 36415 COLL VENOUS BLD VENIPUNCTURE: CPT | Mod: ZL | Performed by: FAMILY MEDICINE

## 2018-07-20 PROCEDURE — 93005 ELECTROCARDIOGRAM TRACING: CPT

## 2018-07-20 PROCEDURE — 87086 URINE CULTURE/COLONY COUNT: CPT | Mod: ZL | Performed by: FAMILY MEDICINE

## 2018-07-20 RX ORDER — ROSUVASTATIN CALCIUM 20 MG/1
20 TABLET, COATED ORAL DAILY
Qty: 90 TABLET | Refills: 3 | Status: SHIPPED | OUTPATIENT
Start: 2018-07-20 | End: 2019-04-29

## 2018-07-20 ASSESSMENT — ANXIETY QUESTIONNAIRES
IF YOU CHECKED OFF ANY PROBLEMS ON THIS QUESTIONNAIRE, HOW DIFFICULT HAVE THESE PROBLEMS MADE IT FOR YOU TO DO YOUR WORK, TAKE CARE OF THINGS AT HOME, OR GET ALONG WITH OTHER PEOPLE: SOMEWHAT DIFFICULT
GAD7 TOTAL SCORE: 4
4. TROUBLE RELAXING: NOT AT ALL
6. BECOMING EASILY ANNOYED OR IRRITABLE: NOT AT ALL
1. FEELING NERVOUS, ANXIOUS, OR ON EDGE: SEVERAL DAYS
2. NOT BEING ABLE TO STOP OR CONTROL WORRYING: SEVERAL DAYS
3. WORRYING TOO MUCH ABOUT DIFFERENT THINGS: SEVERAL DAYS
5. BEING SO RESTLESS THAT IT IS HARD TO SIT STILL: NOT AT ALL
7. FEELING AFRAID AS IF SOMETHING AWFUL MIGHT HAPPEN: SEVERAL DAYS

## 2018-07-20 ASSESSMENT — PAIN SCALES - GENERAL: PAINLEVEL: MODERATE PAIN (4)

## 2018-07-20 NOTE — Clinical Note
Preop, I think at [facility], this wasn't documented in the preop. Please check with Alba or Naomi who roomed her or we will need to call her to see where it is to go Please send with labs and EKG. Thank you!

## 2018-07-20 NOTE — PATIENT INSTRUCTIONS
Before Your Surgery      Call your surgeon if there is any change in your health. This includes signs of a cold or flu (such as a sore throat, runny nose, cough, rash or fever).    Do not smoke, drink alcohol or take over the counter medicine (unless your surgeon or primary care doctor tells you to) for the 24 hours before and after surgery.    If you take prescribed drugs: Follow your doctor s orders about which medicines to take and which to stop until after surgery.    Eating and drinking prior to surgery: follow the instructions from your surgeon    Take a shower or bath the night before surgery. Use the soap your surgeon gave you to gently clean your skin. If you do not have soap from your surgeon, use your regular soap. Do not shave or scrub the surgery site.  Wear clean pajamas and have clean sheets on your bed.     Hold medications the morning of surgery including lisinopril    Nothing to eat or drink after midnight the night before surgery    Stop aspirin 10 days prior to surgery

## 2018-07-20 NOTE — MR AVS SNAPSHOT
After Visit Summary   7/20/2018    Iris Ellis    MRN: [id number]           Patient Information     Date Of Birth          1940        Visit Information        Provider Department      7/20/2018 1:30 PM Anna Bolaños MD [facility]        Today's Diagnoses     Preop general physical exam    -  1    Primary osteoarthritis of left hip        Benign essential hypertension        Hyperlipidemia LDL goal <100        Sleep related leg cramps          Care Instructions        Before Your Surgery      Call your surgeon if there is any change in your health. This includes signs of a cold or flu (such as a sore throat, runny nose, cough, rash or fever).    Do not smoke, drink alcohol or take over the counter medicine (unless your surgeon or primary care doctor tells you to) for the 24 hours before and after surgery.    If you take prescribed drugs: Follow your doctor s orders about which medicines to take and which to stop until after surgery.    Eating and drinking prior to surgery: follow the instructions from your surgeon    Take a shower or bath the night before surgery. Use the soap your surgeon gave you to gently clean your skin. If you do not have soap from your surgeon, use your regular soap. Do not shave or scrub the surgery site.  Wear clean pajamas and have clean sheets on your bed.     Hold medications the morning of surgery including lisinopril    Nothing to eat or drink after midnight the night before surgery    Stop aspirin 10 days prior to surgery          Follow-ups after your visit        Who to contact     If you have questions or need follow up information about today's clinic visit or your schedule please contact [facility] directly at [phone].  Normal or non-critical lab and imaging results will be communicated to you by MyChart, letter or phone within 4 business days after the clinic has received the results. If you do not hear from us  "within 7 days, please contact the clinic through [employer] or phone. If you have a critical or abnormal lab result, we will notify you by phone as soon as possible.  Submit refill requests through [employer] or call your pharmacy and they will forward the refill request to us. Please allow 3 business days for your refill to be completed.          Additional Information About Your Visit        [employer] Information     [employer] gives you secure access to your electronic health record. If you see a primary care provider, you can also send messages to your care team and make appointments. If you have questions, please call your primary care clinic.  If you do not have a primary care provider, please call [phone] and they will assist you.        Care EveryWhere ID     This is your Care EveryWhere ID. This could be used by other organizations to access your [facility] medical records  [id number]        Your Vitals Were     Pulse Temperature Respirations Height Pulse Oximetry BMI (Body Mass Index)    79 97  F (36.1  C) (Tympanic) 18 5' 3.5\" (1.613 m) 96% 29.29 kg/m2       Blood Pressure from Last 3 Encounters:   07/20/18 122/60   06/21/18 122/60   12/21/17 122/68    Weight from Last 3 Encounters:   07/20/18 168 lb (76.2 kg)   06/21/18 165 lb (74.8 kg)   12/21/17 165 lb (74.8 kg)              We Performed the Following     CBC with platelets and differential     Comprehensive metabolic panel     EKG 12-lead complete w/read - (Clinic Performed)     UA reflex to Microscopic and Culture          Where to get your medicines      Some of these will need a paper prescription and others can be bought over the counter.  Ask your nurse if you have questions.     Bring a paper prescription for each of these medications     rosuvastatin 20 MG tablet          Primary Care Provider Office Phone # Fax #    Anna Bolaños [phone]       [address] [place] [address]        Equal Access to Services     [facility] " GAAR : Hadii aad ku angie Nguyen, waaxda luqadaha, qaybta kaalmada adeankit, waxrashida ramiro haythomas churchillkaindottie mosley . So [facility] [phone].    ATENCIÓN: Si habla español, tiene a ray disposición servicios gratuitos de asistencia lingüística. Llame al [phone].    We comply with applicable federal civil rights laws and [place] laws. We do not discriminate on the basis of race, color, national origin, age, disability, sex, sexual orientation, or gender identity.            Thank you!     Thank you for choosing [facility]  for your care. Our goal is always to provide you with excellent care. Hearing back from our patients is one way we can continue to improve our services. Please take a few minutes to complete the written survey that you may receive in the mail after your visit with us. Thank you!             Your Updated Medication List - Protect others around you: Learn how to safely use, store and throw away your medicines at www.disposemymeds.org.          This list is accurate as of 7/20/18  1:59 PM.  Always use your most recent med list.                   Brand Name Dispense Instructions for use Diagnosis    ACIDOPHILUS PO      Take  by mouth. 1 daily        ASPIRIN PO      Take 81 mg by mouth daily        CALCIUM 1200 PO      Take  by mouth. Take 1 daily        lisinopril 5 MG tablet    PRINIVIL/ZESTRIL    90 tablet    Take 1 tablet (5 mg) by oral route every day    Benign essential hypertension       multivitamin Tabs tablet     90 each    Take 1 tablet by mouth daily    Benign essential hypertension       rosuvastatin 20 MG tablet    CRESTOR    90 tablet    Take 1 tablet (20 mg) by mouth daily    Hyperlipidemia LDL goal <100       UNABLE TO FIND      1 tablet daily MEDICATION NAME: Tumeric

## 2018-07-20 NOTE — PROGRESS NOTES
[facility] [place]  [address] [place] [address]  [phone]  Dept: [phone]    PRE-OP EVALUATION:  Today's date: 2018    Iris Ellis (: 1940) presents for pre-operative evaluation assessment as requested by Dr. Whaley.  She requires evaluation and anesthesia risk assessment prior to undergoing surgery/procedure for treatment of  Left hip pain .    Fax number for surgical facility:   Primary Physician: Anna Bolaños  Type of Anesthesia Anticipated: to be determined    Patient has a Health Care Directive or Living Will:  NO    Preop Questions 2018   Who is doing your surgery? Dr. Whaley   What are you having done? [place].   Date of Surgery/Procedure: 2018   Facility or Hospital where procedure/surgery will be performed: [place], [place]   1.  Do you have a history of Heart attack, stroke, stent, coronary bypass surgery, or other heart surgery? No   2.  Do you ever have any pain or discomfort in your chest? No   3.  Do you have a history of  Heart Failure? No   4.   Are you troubled by shortness of breath when:  walking on a level surface, or up a slight hill, or at night? No   5.  Do you currently have a cold, bronchitis or other respiratory infection? No   6.  Do you have a cough, shortness of breath, or wheezing? No   7.  Do you sometimes get pains in the calves of your legs when you walk? No   8. Do you or anyone in your family have previous history of blood clots? YES - mother  of PE after surgery   9.  Do you or does anyone in your family have a serious bleeding problem such as prolonged bleeding following surgeries or cuts? No   10. Have you ever had problems with anemia or been told to take iron pills? No   11. Have you had any abnormal blood loss such as black, tarry or bloody stools, or abnormal vaginal bleeding? No   12. Have you ever had a blood transfusion? No   13. Have you or any of your relatives ever had problems with  anesthesia? UNKNOWN - Iris has trouble waking up   14. Do you have sleep apnea, excessive snoring or daytime drowsiness? No   15. Do you have any prosthetic heart valves? No   16. Do you have prosthetic joints? YES - right hip   17. Is there any chance that you may be pregnant? No         HPI:     HPI related to upcoming procedure: severe left hip pain with end stage DJD      HYPERLIPIDEMIA - Patient has a long history of significant Hyperlipidemia requiring medication for treatment with recent good control. Patient reports no problems or side effects with the medication.                                                                                                                                                       .    MEDICAL HISTORY:     Patient Active Problem List    Diagnosis Date Noted     Anxiety 02/28/2017     Priority: Medium     Benign essential hypertension 07/31/2016     Priority: Medium     Hyperlipidemia LDL goal <100 07/31/2016     Priority: Medium     Osteopenia 03/28/2013     Priority: Medium     Knee pain 03/28/2013     Priority: Medium     Elevated LFTs 03/28/2013     Priority: Medium     Sleep related leg cramps 03/28/2013     Priority: Medium     Preventative health care 03/28/2013     Priority: Medium     Elevated glucose 03/28/2013     Priority: Medium     Mammogram declined 03/28/2013     Priority: Medium     Advanced care planning/counseling discussion 05/21/2012     Priority: Medium      Past Medical History:   Diagnosis Date     Blood glucose elevated      Cystic disease of ovaries      Family history of malignant neoplasm of gastrointestinal tract 04/25/2003    colon cancer in mother     Osteoarthritis 1/20011    Right hip LYNN, Dr Nelson     Osteopenia     Dexa 2004,2006,2009     Other abnormal blood chemistry 09/20/2012     psoriasis 04/21/2011     Pure hypercholesterolemia 11/22/1999     Symptomatic menopausal or female climacteric states 03/31/2003     Unspecified closed  fracture of pelvis 09/20/2011     Unspecified essential hypertension 07/30/2006     Past Surgical History:   Procedure Laterality Date     APPENDECTOMY  1944     Benign positional vertigo       CHOLECYSTECTOMY  1979     colonoscopies  1998, 2003, 2007, 2009, 9/18/2014    Colonic polyps. Repeat colonoscopy 2013     Declines further mammograms  2009     Left superior and inferior pubic rami fractures  2006     ORTHOPEDIC SURGERY  2011    total right hip. Dr. Nelson     PACs, PVCs, SVT, bradycardia on Holter Moniter  2009    Dr. Pace     Urethral dilation  2003    Urethral stricture     Current Outpatient Prescriptions   Medication Sig Dispense Refill     UNABLE TO FIND 1 tablet daily MEDICATION NAME: Tumeric       ASPIRIN PO Take 81 mg by mouth daily       Calcium Carbonate-Vit D-Min (CALCIUM 1200 PO) Take  by mouth. Take 1 daily       Lactobacillus (ACIDOPHILUS PO) Take  by mouth. 1 daily       lisinopril (PRINIVIL/ZESTRIL) 5 MG tablet Take 1 tablet (5 mg) by oral route every day 90 tablet 3     multivitamin (OCUVITE) TABS tablet Take 1 tablet by mouth daily 90 each 3     rosuvastatin (CRESTOR) 20 MG tablet Take 1 tablet (20 mg) by mouth daily 90 tablet 3     OTC products: Aspirin    Allergies   Allergen Reactions     Acetaminophen Nausea     Lortab     Hydrocodone Bitartrate Nausea     Lortab     Naproxen Nausea     Naprosyn     Rofecoxib      Upsets stomach  Vioxx      Latex Allergy: NO    Social History   Substance Use Topics     Smoking status: Former Smoker     Types: Cigarettes     Smokeless tobacco: Never Used     Alcohol use Yes      Comment: rarely     History   Drug Use No       REVIEW OF SYSTEMS:   CONSTITUTIONAL: NEGATIVE for fever, chills, change in weight  INTEGUMENTARY/SKIN: NEGATIVE for worrisome rashes, moles or lesions  EYES: NEGATIVE for vision changes or irritation  ENT/MOUTH: NEGATIVE for ear, mouth and throat problems  RESP: NEGATIVE for significant cough or SOB  BREAST: NEGATIVE for masses,  "tenderness or discharge  CV: NEGATIVE for chest pain, palpitations or peripheral edema  GI: NEGATIVE for nausea, abdominal pain, heartburn, or change in bowel habits  : NEGATIVE for frequency, dysuria, or hematuria  MUSCULOSKELETAL: NEGATIVE for significant arthralgias or myalgia  NEURO: NEGATIVE for weakness, dizziness or paresthesias  ENDOCRINE: NEGATIVE for temperature intolerance, skin/hair changes  HEME: NEGATIVE for bleeding problems  PSYCHIATRIC: NEGATIVE for changes in mood or affect    EXAM:   /60 (BP Location: Left arm, Patient Position: Sitting, Cuff Size: Adult Regular)  Pulse 79  Temp 97  F (36.1  C) (Tympanic)  Resp 18  Ht 5' 3.5\" (1.613 m)  Wt 168 lb (76.2 kg)  SpO2 96%  BMI 29.29 kg/m2    GENERAL APPEARANCE: healthy, alert and no distress     EYES: EOMI, PERRL     HENT: ear canals and TM's normal and nose and mouth without ulcers or lesions     NECK: no adenopathy, no asymmetry, masses, or scars and thyroid normal to palpation     RESP: lungs clear to auscultation - no rales, rhonchi or wheezes     CV: regular rates and rhythm, normal S1 S2, no S3 or S4 and no murmur, click or rub     ABDOMEN:  soft, nontender, no HSM or masses and bowel sounds normal     MS: extremities normal- no gross deformities noted, no evidence of inflammation in joints, FROM in all extremities.     SKIN: no suspicious lesions or rashes     NEURO: Normal strength and tone, sensory exam grossly normal, mentation intact and speech normal     PSYCH: mentation appears normal. and affect normal/bright     LYMPHATICS: No cervical adenopathy    DIAGNOSTICS:     EKG: appears normal, NSR, normal axis, normal intervals, no acute ST/T changes c/w ischemia, no LVH by voltage criteria, Premature Atrial Contractions (PAC) noted, nonspecific ST-T changes  Labs Resulted Today:   Results for orders placed or performed in visit on 07/20/18   CBC with platelets and differential   Result Value Ref Range    WBC 7.3 4.0 - 11.0 10e9/L "    RBC Count 4.64 3.8 - 5.2 10e12/L    Hemoglobin 13.6 11.7 - 15.7 g/dL    Hematocrit 41.6 35.0 - 47.0 %    MCV 90 78 - 100 fl    MCH 29.3 26.5 - 33.0 pg    MCHC 32.7 31.5 - 36.5 g/dL    RDW 13.2 10.0 - 15.0 %    Platelet Count 373 150 - 450 10e9/L    Diff Method Automated Method     % Neutrophils 57.4 %    % Lymphocytes 31.5 %    % Monocytes 8.3 %    % Eosinophils 1.8 %    % Basophils 0.7 %    % Immature Granulocytes 0.3 %    Nucleated RBCs 0 0 /100    Absolute Neutrophil 4.2 1.6 - 8.3 10e9/L    Absolute Lymphocytes 2.3 0.8 - 5.3 10e9/L    Absolute Monocytes 0.6 0.0 - 1.3 10e9/L    Absolute Eosinophils 0.1 0.0 - 0.7 10e9/L    Absolute Basophils 0.1 0.0 - 0.2 10e9/L    Abs Immature Granulocytes 0.0 0 - 0.4 10e9/L    Absolute Nucleated RBC 0.0    Comprehensive metabolic panel   Result Value Ref Range    Sodium 141 133 - 144 mmol/L    Potassium 4.1 3.4 - 5.3 mmol/L    Chloride 105 94 - 109 mmol/L    Carbon Dioxide 30 20 - 32 mmol/L    Anion Gap 6 3 - 14 mmol/L    Glucose 94 70 - 99 mg/dL    Urea Nitrogen 16 7 - 30 mg/dL    Creatinine 0.70 0.52 - 1.04 mg/dL    GFR Estimate 81 >60 mL/min/1.7m2    GFR Estimate If Black >90 >60 mL/min/1.7m2    Calcium 9.2 8.5 - 10.1 mg/dL    Bilirubin Total 0.4 0.2 - 1.3 mg/dL    Albumin 3.9 3.4 - 5.0 g/dL    Protein Total 7.5 6.8 - 8.8 g/dL    Alkaline Phosphatase 77 40 - 150 U/L    ALT 28 0 - 50 U/L    AST 20 0 - 45 U/L   UA reflex to Microscopic and Culture   Result Value Ref Range    Color Urine Yellow     Appearance Urine Clear     Glucose Urine Negative NEG^Negative mg/dL    Bilirubin Urine Negative NEG^Negative    Ketones Urine Negative NEG^Negative mg/dL    Specific Gravity Urine 1.016 1.003 - 1.035    Blood Urine Negative NEG^Negative    pH Urine 6.5 4.7 - 8.0 pH    Protein Albumin Urine Negative NEG^Negative mg/dL    Urobilinogen mg/dL Normal 0.0 - 2.0 mg/dL    Nitrite Urine Negative NEG^Negative    Leukocyte Esterase Urine Moderate (A) NEG^Negative    Source Midstream Urine         Recent Labs   Lab Test  12/20/17   0917  07/26/17   0915   HGB  13.5  13.6   PLT  407  372   NA  141  140   POTASSIUM  4.3  4.1   CR  0.82  0.73   A1C  6.0  6.2*        IMPRESSION:   Reason for surgery/procedure: severe left hip pain with end stage DJD    The proposed surgical procedure is considered INTERMEDIATE risk.    REVISED CARDIAC RISK INDEX  The patient has the following serious cardiovascular risks for perioperative complications such as (MI, PE, VFib and 3  AV Block):  No serious cardiac risks  Negative Lexiscan cardiac stress test 2/6/2017  INTERPRETATION: 1 risks: Class II (low risk - 0.9% complication rate)    The patient has the following additional risks for perioperative complications:  No identified additional risks      ICD-10-CM    1. Preop general physical exam Z01.818 CBC with platelets and differential     Comprehensive metabolic panel     EKG 12-lead complete w/read - (Clinic Performed)     UA reflex to Microscopic and Culture   2. Primary osteoarthritis of left hip M16.12    3. Benign essential hypertension I10    4. Hyperlipidemia LDL goal <100 E78.5 rosuvastatin (CRESTOR) 20 MG tablet   5. Sleep related leg cramps G47.62        RECOMMENDATIONS:       Cardiovascular Risk  Performs 4 METs exercise without symptoms (Light housework (dusting, washing dishes) and Climb a flight of stairs) .     Urine is borderline, will check UC  --Patient is to take all scheduled medications on the day of surgery EXCEPT for modifications listed below.  Hold Lisinopril the morning of surgery      7-22-18  Urine culture is negative  Results for orders placed or performed in visit on 07/20/18   CBC with platelets and differential   Result Value Ref Range    WBC 7.3 4.0 - 11.0 10e9/L    RBC Count 4.64 3.8 - 5.2 10e12/L    Hemoglobin 13.6 11.7 - 15.7 g/dL    Hematocrit 41.6 35.0 - 47.0 %    MCV 90 78 - 100 fl    MCH 29.3 26.5 - 33.0 pg    MCHC 32.7 31.5 - 36.5 g/dL    RDW 13.2 10.0 - 15.0 %    Platelet Count  373 150 - 450 10e9/L    Diff Method Automated Method     % Neutrophils 57.4 %    % Lymphocytes 31.5 %    % Monocytes 8.3 %    % Eosinophils 1.8 %    % Basophils 0.7 %    % Immature Granulocytes 0.3 %    Nucleated RBCs 0 0 /100    Absolute Neutrophil 4.2 1.6 - 8.3 10e9/L    Absolute Lymphocytes 2.3 0.8 - 5.3 10e9/L    Absolute Monocytes 0.6 0.0 - 1.3 10e9/L    Absolute Eosinophils 0.1 0.0 - 0.7 10e9/L    Absolute Basophils 0.1 0.0 - 0.2 10e9/L    Abs Immature Granulocytes 0.0 0 - 0.4 10e9/L    Absolute Nucleated RBC 0.0    Comprehensive metabolic panel   Result Value Ref Range    Sodium 141 133 - 144 mmol/L    Potassium 4.1 3.4 - 5.3 mmol/L    Chloride 105 94 - 109 mmol/L    Carbon Dioxide 30 20 - 32 mmol/L    Anion Gap 6 3 - 14 mmol/L    Glucose 94 70 - 99 mg/dL    Urea Nitrogen 16 7 - 30 mg/dL    Creatinine 0.70 0.52 - 1.04 mg/dL    GFR Estimate 81 >60 mL/min/1.7m2    GFR Estimate If Black >90 >60 mL/min/1.7m2    Calcium 9.2 8.5 - 10.1 mg/dL    Bilirubin Total 0.4 0.2 - 1.3 mg/dL    Albumin 3.9 3.4 - 5.0 g/dL    Protein Total 7.5 6.8 - 8.8 g/dL    Alkaline Phosphatase 77 40 - 150 U/L    ALT 28 0 - 50 U/L    AST 20 0 - 45 U/L   UA reflex to Microscopic and Culture   Result Value Ref Range    Color Urine Yellow     Appearance Urine Clear     Glucose Urine Negative NEG^Negative mg/dL    Bilirubin Urine Negative NEG^Negative    Ketones Urine Negative NEG^Negative mg/dL    Specific Gravity Urine 1.016 1.003 - 1.035    Blood Urine Negative NEG^Negative    pH Urine 6.5 4.7 - 8.0 pH    Protein Albumin Urine Negative NEG^Negative mg/dL    Urobilinogen mg/dL Normal 0.0 - 2.0 mg/dL    Nitrite Urine Negative NEG^Negative    Leukocyte Esterase Urine Moderate (A) NEG^Negative    Source Midstream Urine    Urine Culture Aerobic Bacterial   Result Value Ref Range    Specimen Description Midstream Urine     Culture Micro       <10,000 colonies/mL  mixed urogenital fritz  No further identification or sensitivity done         APPROVAL  GIVEN to proceed with proposed procedure, without further diagnostic evaluation       Signed Electronically by: Anna Bolaños MD    Copy of this evaluation report is provided to requesting physician.    [facility] Preop Guidelines    Revised Cardiac Risk Index

## 2018-07-20 NOTE — NURSING NOTE
"Chief Complaint   Patient presents with     Pre-Op Exam       Initial /60 (BP Location: Left arm, Patient Position: Sitting, Cuff Size: Adult Regular)  Pulse 79  Temp 97  F (36.1  C) (Tympanic)  Resp 18  Ht 5' 3.5\" (1.613 m)  Wt 168 lb (76.2 kg)  SpO2 96%  BMI 29.29 kg/m2 Estimated body mass index is 29.29 kg/(m^2) as calculated from the following:    Height as of this encounter: 5' 3.5\" (1.613 m).    Weight as of this encounter: 168 lb (76.2 kg).  Medication Reconciliation: complete    Naomi Pires LPN  "

## 2018-07-21 ASSESSMENT — ANXIETY QUESTIONNAIRES: GAD7 TOTAL SCORE: 4

## 2018-07-21 ASSESSMENT — PATIENT HEALTH QUESTIONNAIRE - PHQ9: SUM OF ALL RESPONSES TO PHQ QUESTIONS 1-9: 7

## 2018-07-22 LAB
BACTERIA SPEC CULT: NORMAL
SPECIMEN SOURCE: NORMAL

## 2018-07-24 ENCOUNTER — TELEPHONE (OUTPATIENT)
Dept: FAMILY MEDICINE | Facility: OTHER | Age: 78
End: 2018-07-24

## 2018-07-30 ENCOUNTER — TRANSFERRED RECORDS (OUTPATIENT)
Dept: HEALTH INFORMATION MANAGEMENT | Facility: CLINIC | Age: 78
End: 2018-07-30

## 2018-08-14 ENCOUNTER — TRANSFERRED RECORDS (OUTPATIENT)
Dept: HEALTH INFORMATION MANAGEMENT | Facility: CLINIC | Age: 78
End: 2018-08-14

## 2018-09-12 ENCOUNTER — TRANSFERRED RECORDS (OUTPATIENT)
Dept: HEALTH INFORMATION MANAGEMENT | Facility: CLINIC | Age: 78
End: 2018-09-12

## 2018-11-19 ENCOUNTER — RADIANT APPOINTMENT (OUTPATIENT)
Dept: GENERAL RADIOLOGY | Facility: OTHER | Age: 78
End: 2018-11-19
Attending: FAMILY MEDICINE
Payer: MEDICARE

## 2018-11-19 ENCOUNTER — OFFICE VISIT (OUTPATIENT)
Dept: FAMILY MEDICINE | Facility: OTHER | Age: 78
End: 2018-11-19
Attending: FAMILY MEDICINE
Payer: MEDICARE

## 2018-11-19 VITALS — DIASTOLIC BLOOD PRESSURE: 64 MMHG | SYSTOLIC BLOOD PRESSURE: 140 MMHG | OXYGEN SATURATION: 98 % | HEART RATE: 74 BPM

## 2018-11-19 DIAGNOSIS — S20.211A CONTUSION OF RIB ON RIGHT SIDE, INITIAL ENCOUNTER: ICD-10-CM

## 2018-11-19 DIAGNOSIS — M54.6 ACUTE RIGHT-SIDED THORACIC BACK PAIN: ICD-10-CM

## 2018-11-19 DIAGNOSIS — M54.6 ACUTE RIGHT-SIDED THORACIC BACK PAIN: Primary | ICD-10-CM

## 2018-11-19 PROCEDURE — G0463 HOSPITAL OUTPT CLINIC VISIT: HCPCS

## 2018-11-19 PROCEDURE — 72070 X-RAY EXAM THORAC SPINE 2VWS: CPT | Mod: TC

## 2018-11-19 PROCEDURE — 71100 X-RAY EXAM RIBS UNI 2 VIEWS: CPT | Mod: TC,RT

## 2018-11-19 PROCEDURE — 99213 OFFICE O/P EST LOW 20 MIN: CPT | Performed by: FAMILY MEDICINE

## 2018-11-19 RX ORDER — NABUMETONE 500 MG/1
500-1000 TABLET, FILM COATED ORAL 2 TIMES DAILY PRN
Qty: 60 TABLET | Refills: 1 | Status: SHIPPED | OUTPATIENT
Start: 2018-11-19 | End: 2019-04-29

## 2018-11-19 RX ORDER — TRAMADOL HYDROCHLORIDE 50 MG/1
50 TABLET ORAL EVERY 6 HOURS PRN
Qty: 10 TABLET | Refills: 0 | Status: SHIPPED | OUTPATIENT
Start: 2018-11-19 | End: 2019-04-29

## 2018-11-19 ASSESSMENT — PAIN SCALES - GENERAL: PAINLEVEL: MODERATE PAIN (5)

## 2018-11-19 NOTE — MR AVS SNAPSHOT
After Visit Summary   11/19/2018    Iris Ellis    MRN: [id number]           Patient Information     Date Of Birth          1940        Visit Information        Provider Department      11/19/2018 2:00 PM Anna Bolaños MD [facility]        Today's Diagnoses     Acute right-sided thoracic back pain    -  1    Contusion of rib on right side, initial encounter           Follow-ups after your visit        Follow-up notes from your care team     Return if symptoms worsen or fail to improve.      Who to contact     If you have questions or need follow up information about today's clinic visit or your schedule please contact [facility] directly at [phone].  Normal or non-critical lab and imaging results will be communicated to you by MyChart, letter or phone within 4 business days after the clinic has received the results. If you do not hear from us within 7 days, please contact the clinic through [employer] or phone. If you have a critical or abnormal lab result, we will notify you by phone as soon as possible.  Submit refill requests through [employer] or call your pharmacy and they will forward the refill request to us. Please allow 3 business days for your refill to be completed.          Additional Information About Your Visit        MyChart Information     [employer] gives you secure access to your electronic health record. If you see a primary care provider, you can also send messages to your care team and make appointments. If you have questions, please call your primary care clinic.  If you do not have a primary care provider, please call [phone] and they will assist you.        Care EveryWhere ID     This is your Care EveryWhere ID. This could be used by other organizations to access your [place] medical records  [id number]        Your Vitals Were     Pulse Pulse Oximetry                74 98%           Blood Pressure from Last 3  Encounters:   11/19/18 140/64   07/20/18 122/60   06/21/18 122/60    Weight from Last 3 Encounters:   07/20/18 168 lb (76.2 kg)   06/21/18 165 lb (74.8 kg)   12/21/17 165 lb (74.8 kg)                 Today's Medication Changes          These changes are accurate as of 11/19/18  3:18 PM.  If you have any questions, ask your nurse or doctor.               Start taking these medicines.        Dose/Directions    nabumetone 500 MG tablet   Commonly known as:  RELAFEN   Used for:  Contusion of rib on right side, initial encounter   Started by:  Anna Bolaños MD        Dose:  500-1000 mg   Take 1-2 tablets (500-1,000 mg) by mouth 2 times daily as needed for moderate pain   Quantity:  60 tablet   Refills:  1       traMADol 50 MG tablet   Commonly known as:  ULTRAM   Used for:  Contusion of rib on right side, initial encounter   Started by:  Anna Bolaños MD        Dose:  50 mg   Take 1 tablet (50 mg) by mouth every 6 hours as needed for severe pain   Quantity:  10 tablet   Refills:  0            Where to get your medicines      These medications were sent to [facility] - [facility] - [address]  [address] [address]     Phone:  [phone]     nabumetone 500 MG tablet         Some of these will need a paper prescription and others can be bought over the counter.  Ask your nurse if you have questions.     Bring a paper prescription for each of these medications     traMADol 50 MG tablet               Information about OPIOIDS     PRESCRIPTION OPIOIDS: WHAT YOU NEED TO KNOW   We gave you an opioid (narcotic) pain medicine. It is important to manage your pain, but opioids are not always the best choice. You should first try all the other options your care team gave you. Take this medicine for as short a time (and as few doses) as possible.    Some activities can increase your pain, such as bandage changes or therapy sessions. It may help to take your pain medicine 30 to 60 minutes before  these activities. Reduce your stress by getting enough sleep, working on hobbies you enjoy and practicing relaxation or meditation. Talk to your care team about ways to manage your pain beyond prescription opioids.    These medicines have risks:    DO NOT drive when on new or higher doses of pain medicine. These medicines can affect your alertness and reaction times, and you could be arrested for driving under the influence (DUI). If you need to use opioids long-term, talk to your care team about driving.    DO NOT operate heavy machinery    DO NOT do any other dangerous activities while taking these medicines.    DO NOT drink any alcohol while taking these medicines.     If the opioid prescribed includes acetaminophen, DO NOT take with any other medicines that contain acetaminophen. Read all labels carefully. Look for the word  acetaminophen  or  Tylenol.  Ask your pharmacist if you have questions or are unsure.    You can get addicted to pain medicines, especially if you have a history of addiction (chemical, alcohol or substance dependence). Talk to your care team about ways to reduce this risk.    All opioids tend to cause constipation. Drink plenty of water and eat foods that have a lot of fiber, such as fruits, vegetables, prune juice, apple juice and high-fiber cereal. Take a laxative (Miralax, milk of magnesia, Colace, Senna) if you don t move your bowels at least every other day. Other side effects include upset stomach, sleepiness, dizziness, throwing up, tolerance (needing more of the medicine to have the same effect), physical dependence and slowed breathing.    Store your pills in a secure place, locked if possible. We will not replace any lost or stolen medicine. If you don t finish your medicine, please throw away (dispose) as directed by your pharmacist. The [place] Pollution Control Agency has more information about safe disposal:  [email or web address]         Primary Care Provider Office Phone # Fax #    Anna Bolaños [phone] [address] [facility] [address]        Equal Access to Services     ROHINI BARBOUR : Jerrod valle hadoliviao Soomaali, waaxda luqadaha, qaybta kaalmada adeegyada, jody rubio davidjoi amador krystaldottie harkins. So [facility] [phone].    ATENCIÓN: Si habla español, tiene a ray disposición servicios gratuitos de asistencia lingüística. Dina al [phone].    We comply with applicable federal civil rights laws and [place] laws. We do not discriminate on the basis of race, color, national origin, age, disability, sex, sexual orientation, or gender identity.            Thank you!     Thank you for choosing [facility]  for your care. Our goal is always to provide you with excellent care. Hearing back from our patients is one way we can continue to improve our services. Please take a few minutes to complete the written survey that you may receive in the mail after your visit with us. Thank you!             Your Updated Medication List - Protect others around you: Learn how to safely use, store and throw away your medicines at www.disposemymeds.org.          This list is accurate as of 11/19/18  3:18 PM.  Always use your most recent med list.                   Brand Name Dispense Instructions for use Diagnosis    ACIDOPHILUS PO      Take  by mouth. 1 daily        ASPIRIN PO      Take 81 mg by mouth daily        CALCIUM 1200 PO      Take  by mouth. Take 1 daily        lisinopril 5 MG tablet    PRINIVIL/ZESTRIL    90 tablet    Take 1 tablet (5 mg) by oral route every day    Benign essential hypertension       multivitamin Tabs tablet     90 each    Take 1 tablet by mouth daily    Benign essential hypertension       nabumetone 500 MG tablet    RELAFEN    60 tablet    Take 1-2 tablets (500-1,000 mg) by mouth 2 times daily as needed  for moderate pain    Contusion of rib on right side, initial encounter       rosuvastatin 20 MG tablet    CRESTOR    90 tablet    Take 1 tablet (20 mg) by mouth daily    Hyperlipidemia LDL goal <100       traMADol 50 MG tablet    ULTRAM    10 tablet    Take 1 tablet (50 mg) by mouth every 6 hours as needed for severe pain    Contusion of rib on right side, initial encounter       UNABLE TO FIND      1 tablet daily MEDICATION NAME: Tumeric

## 2018-11-19 NOTE — NURSING NOTE
"Chief Complaint   Patient presents with     Back Pain       Initial /64 (BP Location: Right arm, Patient Position: Sitting, Cuff Size: Adult Regular)  Pulse 74  SpO2 98% Estimated body mass index is 29.29 kg/(m^2) as calculated from the following:    Height as of 7/20/18: 5' 3.5\" (1.613 m).    Weight as of 7/20/18: 168 lb (76.2 kg).  Medication Reconciliation: complete    Lissy Segura MA  "

## 2018-11-27 ENCOUNTER — TRANSFERRED RECORDS (OUTPATIENT)
Dept: HEALTH INFORMATION MANAGEMENT | Facility: CLINIC | Age: 78
End: 2018-11-27

## 2019-02-27 ENCOUNTER — TRANSFERRED RECORDS (OUTPATIENT)
Dept: HEALTH INFORMATION MANAGEMENT | Facility: CLINIC | Age: 79
End: 2019-02-27

## 2019-03-04 ENCOUNTER — HOSPITAL ENCOUNTER (OUTPATIENT)
Dept: MRI IMAGING | Facility: HOSPITAL | Age: 79
Discharge: HOME | End: 2019-03-04
Attending: ORTHOPAEDIC SURGERY | Admitting: ORTHOPAEDIC SURGERY
Payer: MEDICARE

## 2019-03-04 DIAGNOSIS — M25.562 LEFT KNEE PAIN: ICD-10-CM

## 2019-03-04 PROCEDURE — 73721 MRI JNT OF LWR EXTRE W/O DYE: CPT | Mod: TC,LT

## 2019-03-12 ENCOUNTER — TRANSFERRED RECORDS (OUTPATIENT)
Dept: HEALTH INFORMATION MANAGEMENT | Facility: CLINIC | Age: 79
End: 2019-03-12

## 2019-04-18 DIAGNOSIS — H91.93 DECREASED HEARING OF BOTH EARS: Primary | ICD-10-CM

## 2019-04-26 ENCOUNTER — OFFICE VISIT (OUTPATIENT)
Dept: AUDIOLOGY | Facility: OTHER | Age: 79
End: 2019-04-26
Attending: AUDIOLOGIST
Payer: MEDICARE

## 2019-04-26 DIAGNOSIS — H90.3 SENSORINEURAL HEARING LOSS (SNHL) OF BOTH EARS: Primary | ICD-10-CM

## 2019-04-26 PROCEDURE — V5299 HEARING SERVICE: HCPCS | Performed by: AUDIOLOGIST

## 2019-04-26 NOTE — PROGRESS NOTES
Background: Patient seen for hearing aid check and hearing evaluation.  Results: Otoscopy shows right cerumen impaction and left cerumen free. Patient reports right ear feels plugged.    Hearing evaluation pending cerumen management.    Hearing aid check-good visual inspection and sits well on ear, but right eyeglass bow presses down on aid and left does not. She reports right ear gets sore. She will have glasses adjusted.    Recommendations: Cerumen management and return to complete evaluation.   Haley Palmer M.S., [facility]  Audiologist #[id number]

## 2019-04-29 ENCOUNTER — ANCILLARY PROCEDURE (OUTPATIENT)
Dept: GENERAL RADIOLOGY | Facility: OTHER | Age: 79
End: 2019-04-29
Attending: PHYSICIAN ASSISTANT
Payer: MEDICARE

## 2019-04-29 ENCOUNTER — OFFICE VISIT (OUTPATIENT)
Dept: FAMILY MEDICINE | Facility: OTHER | Age: 79
End: 2019-04-29
Attending: PHYSICIAN ASSISTANT
Payer: MEDICARE

## 2019-04-29 VITALS
TEMPERATURE: 97.2 F | DIASTOLIC BLOOD PRESSURE: 58 MMHG | HEIGHT: 64 IN | WEIGHT: 165 LBS | OXYGEN SATURATION: 96 % | BODY MASS INDEX: 28.17 KG/M2 | HEART RATE: 69 BPM | SYSTOLIC BLOOD PRESSURE: 114 MMHG

## 2019-04-29 DIAGNOSIS — H61.21 IMPACTED CERUMEN OF RIGHT EAR: ICD-10-CM

## 2019-04-29 DIAGNOSIS — H91.93 DECREASED HEARING OF BOTH EARS: Primary | ICD-10-CM

## 2019-04-29 DIAGNOSIS — M54.2 NECK PAIN: Primary | ICD-10-CM

## 2019-04-29 DIAGNOSIS — M54.2 NECK PAIN: ICD-10-CM

## 2019-04-29 PROCEDURE — 72050 X-RAY EXAM NECK SPINE 4/5VWS: CPT | Mod: TC

## 2019-04-29 PROCEDURE — G0463 HOSPITAL OUTPT CLINIC VISIT: HCPCS | Mod: 25

## 2019-04-29 PROCEDURE — 99213 OFFICE O/P EST LOW 20 MIN: CPT | Performed by: PHYSICIAN ASSISTANT

## 2019-04-29 RX ORDER — ROSUVASTATIN CALCIUM 20 MG/1
20 TABLET, COATED ORAL DAILY
COMMUNITY
End: 2019-11-25

## 2019-04-29 RX ORDER — TRAMADOL HYDROCHLORIDE 50 MG/1
50 TABLET ORAL EVERY 6 HOURS PRN
Qty: 30 TABLET | Refills: 0 | Status: SHIPPED | OUTPATIENT
Start: 2019-04-29 | End: 2019-10-29

## 2019-04-29 ASSESSMENT — MIFFLIN-ST. JEOR: SCORE: 1190.5

## 2019-04-29 ASSESSMENT — PATIENT HEALTH QUESTIONNAIRE - PHQ9: SUM OF ALL RESPONSES TO PHQ QUESTIONS 1-9: 0

## 2019-04-29 ASSESSMENT — PAIN SCALES - GENERAL: PAINLEVEL: EXTREME PAIN (8)

## 2019-04-29 NOTE — PATIENT INSTRUCTIONS
Thank you for choosing [facility].   I have office hours 8:00 am to 4:30 pm on Monday's, Wednesday's, Thursday's and Friday's. My nurse and I are out of the office every Tuesday.    Following your visit, when your labs and diagnostic testing have returned, I will review then and you will be contacted by my nurse.  If you are on My Chart, you can also view results there.    For refills, notify your pharmacy regarding what you need and the pharmacy will generate a refill request. Do not call my nurse as she is unable to process refill request. Please plan ahead and allow 3-5 days for refill requests.    You will generally receive a reminder call the day prior to your appointment.  If you cannot attend your appointment, please cancel your appointment with as much notice as possible.  If there is a pattern of failure to present for your appointments, I cannot provide consistent, meaningful, ongoing care for you. It is very important to me that you come in for your care, so we can best assist you with your health care needs.    IMPORTANT:  Please note that it is my standard of practice to NOT participate in prescribing ongoing requested Narcotic Analgesic therapy, and/or participate in the prescribing of other controlled substances.  My nurse and I am happy to assist you with the process of referral for alternative pain management as needed, and other treatment modalities including but not limited to:  Physical Therapy, Physical Medicine and Rehab, Counseling, Chiropractic Care, Orthopedic Care, and non-narcotic medication management.     In the event that you need to be seen for emergent concerns and I am out of office,  please see one of my colleagues for acute concerns.  You may also present to  or ER.  I appreciate the opportunity to serve you and look forward to supporting your healthcare needs in the future. Please contact me with any questions or concerns that you may  have.    Sincerely,      Beatrice Denson RN, PA-C

## 2019-04-29 NOTE — PROGRESS NOTES
SUBJECTIVE:   Iris Ellis is a 79 year old female who presents to clinic today for the following   health issues:        Musculoskeletal problem/pain      Duration: 1 week    Description  Location: Neck    Intensity:  moderate    Accompanying signs and symptoms: radiation of pain to to across back bilateral and into skull base    History  Previous similar problem: no   Previous evaluation:  none    Precipitating or alleviating factors:  Trauma or overuse: YES  Aggravating factors include: sitting, standing, walking, lifting, exercise and overuse    Therapies tried and outcome: rest/inactivity, heat, acetaminophen and NSAID - Aleve iburofen          Additional history: as documented    Reviewed  and updated as needed this visit by clinical staff         Reviewed and updated as needed this visit by Provider         Patient Active Problem List   Diagnosis     Osteopenia     Knee pain     Elevated LFTs     Sleep related leg cramps     Preventative health care     Elevated glucose     Mammogram declined     Advanced care planning/counseling discussion     Benign essential hypertension     Hyperlipidemia LDL goal <100     Anxiety     Past Surgical History:   Procedure Laterality Date     APPENDECTOMY  1944     Benign positional vertigo       CHOLECYSTECTOMY  1979     colonoscopies  1998, 2003, 2007, 2009, 9/18/2014    Colonic polyps. Repeat colonoscopy 2013     Declines further mammograms  2009     Left superior and inferior pubic rami fractures  2006     ORTHOPEDIC SURGERY  2011    total right hip. Dr. Nelson     PACs, PVCs, SVT, bradycardia on Holter Moniter  2009    Dr. Pace     Urethral dilation  2003    Urethral stricture       Social History     Tobacco Use     Smoking status: Former Smoker     Types: Cigarettes     Smokeless tobacco: Never Used   Substance Use Topics     Alcohol use: Yes     Comment: rarely     Family History   Problem Relation Age of Onset     Alzheimer Disease Father      Cancer  Sister         Colon     Cancer Brother         Lung, prostate, colon     Respiratory Brother      Diabetes Mother      Cancer Other         Colon-family h/o         Current Outpatient Medications   Medication Sig Dispense Refill     ASPIRIN PO Take 81 mg by mouth daily       Calcium Carbonate-Vit D-Min (CALCIUM 1200 PO) Take  by mouth. Take 1 daily       Lactobacillus (ACIDOPHILUS PO) Take  by mouth. 1 daily       lisinopril (PRINIVIL/ZESTRIL) 5 MG tablet Take 1 tablet (5 mg) by oral route every day 90 tablet 3     multivitamin (OCUVITE) TABS tablet Take 1 tablet by mouth daily 90 each 3     rosuvastatin (CRESTOR) 20 MG tablet Take 20 mg by mouth daily       traMADol (ULTRAM) 50 MG tablet Take 1 tablet (50 mg) by mouth every 6 hours as needed for severe pain 30 tablet 0     UNABLE TO FIND 1 tablet daily MEDICATION NAME: Tumeric       Allergies   Allergen Reactions     Nabumetone Rash     Acetaminophen Nausea     Lortab     Hydrocodone Bitartrate Nausea     Lortab     Naproxen Nausea     Naprosyn     Rofecoxib      Upsets stomach  Vioxx     Recent Labs   Lab Test 07/20/18  1410 12/20/17  0917 07/26/17  0915  07/20/16  0923  07/24/13  0912   A1C  --  6.0 6.2*  --   --   --  5.9   LDL  --  73 107*  --  77   < > 117   HDL  --  79 67  --  73   < > 75*   TRIG  --  106 136  --  109   < > 66   ALT 28 30 30  --  24   < > 29   CR 0.70 0.82 0.73  --  0.76  --  1.02*   GFRESTIMATED 81 68 77  --  73  --  53*   GFRESTBLACK >90 82 >90   GFR Calc    --  88  --  64   POTASSIUM 4.1 4.3 4.1   < > 4.3   < > 3.9   TSH  --  2.02  --   --   --   --   --     < > = values in this interval not displayed.      BP Readings from Last 3 Encounters:   04/29/19 114/58   11/19/18 140/64   07/20/18 122/60    Wt Readings from Last 3 Encounters:   04/29/19 74.8 kg (165 lb)   07/20/18 76.2 kg (168 lb)   06/21/18 74.8 kg (165 lb)                    ROS:  Constitutional, neuro, ENT, endocrine, pulmonary, cardiac, gastrointestinal,  "genitourinary, musculoskeletal, integument and psychiatric systems are negative, except as otherwise noted.    OBJECTIVE:                                                    /58 (BP Location: Left arm, Patient Position: Sitting, Cuff Size: Adult Regular)   Pulse 69   Temp 97.2  F (36.2  C) (Tympanic)   Ht 1.613 m (5' 3.5\")   Wt 74.8 kg (165 lb)   SpO2 96%   BMI 28.77 kg/m    Body mass index is 28.77 kg/m .  GENERAL APPEARANCE: healthy, alert and no distress  EYES: Eyes grossly normal to inspection, PERRL and conjunctivae and sclerae normal  HENT: ear canals and TM's right is plugged left is clear. Nose and mouth without ulcers or lesions  NECK: no adenopathy, no asymmetry, masses, or scars and thyroid normal to palpation  RESP: lungs clear to auscultation - no rales, rhonchi or wheezes  CV: regular rates and rhythm, normal S1 S2, no S3 or S4 and no murmur, click or rub  MS: extremities normal- no gross deformities noted. Significant spasm on right neck. Tenderness in trapezius.   SKIN: no suspicious lesions or rashes  NEURO: Normal strength and tone, mentation intact and speech normal  PSYCH: mentation appears normal and affect normal/bright    Diagnostic test results:  Diagnostic Test Results:  No results found for this or any previous visit (from the past 24 hour(s)).   Study Result     XR CERVICAL SPINE G/E 4 VW     HISTORY: 79 years Female Neck pain     COMPARISON: None     TECHNIQUE: Five-view cervical spine     FINDINGS: Alignment is normal. There is no concerning prevertebral  soft tissue edema.     There is mild disc space narrowing of C2-C3. There is moderate to  severe disc space narrowing of C3-C4 and C5-C6. There is moderate disc  space narrowing of C4-C5 and C6-C7.     The neuroforamen are patent.                                                                      IMPRESSION: Multilevel degenerative disc disease of the cervical spine  as described above.       ASSESSMENT/PLAN:                "                                     1. Neck pain  Her neck is very sore and tender. Will try topical and will be given refill on her medication. See Dr. Anna tomlin if this is not getting better. Xray do show arthritis. Mild compression on C 3.   - XR CERVICAL SPINE G/E 4 VIEWS (Clinic Performed); Future  - traMADol (ULTRAM) 50 MG tablet; Take 1 tablet (50 mg) by mouth every 6 hours as needed for severe pain  Dispense: 30 tablet; Refill: 0    2. Impacted cerumen of right ear  Given an ear wash.  Hard to dislodge nurse with him             See Patient Instructions    JACINTO Griffiths  [facility] - [facility]

## 2019-04-29 NOTE — NURSING NOTE
"Chief Complaint   Patient presents with     Musculoskeletal Problem       Initial /58 (BP Location: Left arm, Patient Position: Sitting, Cuff Size: Adult Regular)   Pulse 69   Temp 97.2  F (36.2  C) (Tympanic)   Ht 1.613 m (5' 3.5\")   Wt 74.8 kg (165 lb)   SpO2 96%   BMI 28.77 kg/m   Estimated body mass index is 28.77 kg/m  as calculated from the following:    Height as of this encounter: 1.613 m (5' 3.5\").    Weight as of this encounter: 74.8 kg (165 lb).  Medication Reconciliation: complete    Arminda Olguin LPN  "

## 2019-05-09 ENCOUNTER — OFFICE VISIT (OUTPATIENT)
Dept: OTOLARYNGOLOGY | Facility: OTHER | Age: 79
End: 2019-05-09
Attending: PHYSICIAN ASSISTANT
Payer: MEDICARE

## 2019-05-09 VITALS
TEMPERATURE: 97.4 F | DIASTOLIC BLOOD PRESSURE: 60 MMHG | HEIGHT: 64 IN | HEART RATE: 81 BPM | WEIGHT: 163 LBS | SYSTOLIC BLOOD PRESSURE: 130 MMHG | BODY MASS INDEX: 27.83 KG/M2

## 2019-05-09 DIAGNOSIS — H81.13 BENIGN PAROXYSMAL POSITIONAL VERTIGO, BILATERAL: Primary | ICD-10-CM

## 2019-05-09 DIAGNOSIS — H90.3 SENSORINEURAL HEARING LOSS (SNHL) OF BOTH EARS: ICD-10-CM

## 2019-05-09 PROCEDURE — 92504 EAR MICROSCOPY EXAMINATION: CPT | Performed by: PHYSICIAN ASSISTANT

## 2019-05-09 PROCEDURE — 92504 EAR MICROSCOPY EXAMINATION: CPT

## 2019-05-09 PROCEDURE — 99213 OFFICE O/P EST LOW 20 MIN: CPT | Mod: 25 | Performed by: PHYSICIAN ASSISTANT

## 2019-05-09 PROCEDURE — G0463 HOSPITAL OUTPT CLINIC VISIT: HCPCS

## 2019-05-09 ASSESSMENT — PAIN SCALES - GENERAL: PAINLEVEL: NO PAIN (0)

## 2019-05-09 ASSESSMENT — MIFFLIN-ST. JEOR: SCORE: 1181.42

## 2019-05-09 NOTE — LETTER
5/9/2019         RE: Iris Ellis  [address] [facility] [address]        Dear Colleague,    Thank you for referring your patient, Iris Ellis, to the [facility]. Please see a copy of my visit note below.    Chief Complaint   Patient presents with     Cerumen Impaction     Pt is here for an ear cleaning.       Iris returns to ENT for ear cleaning. She was last seen in ENT on 11/5/14 for ear cleaning. She tolerated ear cleaning well, without concerns. She had a   She was in for ear cleaning and noted cerumen impaction in her right ear.   NO history of loud noise expouse. No history of hearing loss.   No COM or otologic suergies.   She has no otalgia, otorrhea. She has a history of BPPV and does have symptoms w/ turning head and movement. She has a hard time getting her hair done.         Past Medical History:   Diagnosis Date     Blood glucose elevated      Cystic disease of ovaries      Family history of malignant neoplasm of gastrointestinal tract 04/25/2003    colon cancer in mother     Osteoarthritis 1/20011    Right hip LYNN, Dr Nelson     Osteopenia     Dexa 2004,2006,2009     Other abnormal blood chemistry 09/20/2012     psoriasis 04/21/2011     Pure hypercholesterolemia 11/22/1999     Symptomatic menopausal or female climacteric states 03/31/2003     Unspecified closed fracture of pelvis 09/20/2011     Unspecified essential hypertension 07/30/2006        Allergies   Allergen Reactions     Nabumetone Rash     Acetaminophen Nausea     Lortab     Hydrocodone Bitartrate Nausea     Lortab     Naproxen Nausea     Naprosyn     Rofecoxib      Upsets stomach  Vioxx     Current Outpatient Medications   Medication     ASPIRIN PO     Calcium Carbonate-Vit D-Min (CALCIUM 1200 PO)     Lactobacillus (ACIDOPHILUS PO)     lisinopril (PRINIVIL/ZESTRIL) 5 MG tablet     multivitamin (OCUVITE) TABS tablet     rosuvastatin (CRESTOR) 20 MG tablet     traMADol (ULTRAM) 50 MG  "tablet     UNABLE TO FIND     No current facility-administered medications for this visit.       ROS: 10 point ROS neg other than the symptoms noted above in the HPI.  /60 (BP Location: Right arm, Cuff Size: Adult Regular)   Pulse 81   Temp 97.4  F (36.3  C) (Tympanic)   Ht 1.613 m (5' 3.5\")   Wt 73.9 kg (163 lb)   BMI 28.42 kg/m       General - The patient is well nourished and well developed, and appears to have good nutritional status.  Alert and oriented to person and place, answers questions and cooperates with examination appropriately.   Head and Face - Normocephalic and atraumatic, with no gross asymmetry noted.  The facial nerve is intact, with strong symmetric movements.  Voice and Breathing - The patient was breathing comfortably without the use of accessory muscles. There was no wheezing, stridor, or stertor.  The patients voice was clear and strong, and had appropriate pitch and quality.  Ears -The external auditory canals are patent, the tympanic membranes are intact without effusion, retraction or mass.  Bony landmarks are intact. Ears were examined under microscopy. No cerumen present.   Eyes - Extraocular movements intact, and the pupils were reactive to light.  Sclera were not icteric or injected, conjunctiva were pink and moist.  Mouth - Examination of the oral cavity showed pink, healthy oral mucosa. No lesions or ulcerations noted.  The tongue was mobile and midline, and the dentition were in good condition.    Throat - The walls of the oropharynx were smooth, pink, moist, symmetric, and had no lesions or ulcerations.  The tonsillar pillars and soft palate were symmetric.  The uvula was midline on elevation.    Neck - Normal midline excursion of the laryngotracheal complex during swallowing.  Full range of motion on passive movement.  Palpation of the occipital, submental, submandibular, internal jugular chain, and supraclavicular nodes did not demonstrate any abnormal lymph nodes or " masses.  Palpation of the thyroid was soft and smooth, with no nodules or goiter appreciated.  The trachea was mobile and midline.  Nose - External contour is symmetric, no gross deflection or scars.  Nasal mucosa is pink and moist with no abnormal mucus.  The septum and turbinates were evaluated:   No polyps, masses, or purulence noted on examination.  [place] Hallpike, Positive bilateral. Repositioned Right.       ASSESSMENT:    ICD-10-CM    1. Benign paroxysmal positional vertigo, bilateral H81.13    2. Sensorineural hearing loss (SNHL) of both ears H90.3      Ears are clear. No cerumen.   Return as needed for ear cleaning.   Refer to physical therapy for positional vertigo- Bilateral     Based on today's exam and history, I think that the most likely diagnosis at this point is benign paroxysmal positional vertigo.  The etiology of benign paroxysmal positional vertigo being small crystals tumbling in the semicircular canals was discussed at length.  Also, the treatment of the problem with physical therapy for canalith repositioning maneuvers was discussed.    I will send the patient to physical therapy for this to be done.      Silva Robledo PA-C  ENT  [facility], [place]  [phone]      Again, thank you for allowing me to participate in the care of your patient.        Sincerely,        Silva Robledo PA-C

## 2019-05-09 NOTE — PROGRESS NOTES
Chief Complaint   Patient presents with     Cerumen Impaction     Pt is here for an ear cleaning.       Iris returns to ENT for ear cleaning. She was last seen in ENT on 11/5/14 for ear cleaning. She tolerated ear cleaning well, without concerns. She had a   She was in for ear cleaning and noted cerumen impaction in her right ear.   NO history of loud noise expouse. No history of hearing loss.   No COM or otologic suergies.   She has no otalgia, otorrhea. She has a history of BPPV and does have symptoms w/ turning head and movement. She has a hard time getting her hair done.         Past Medical History:   Diagnosis Date     Blood glucose elevated      Cystic disease of ovaries      Family history of malignant neoplasm of gastrointestinal tract 04/25/2003    colon cancer in mother     Osteoarthritis 1/20011    Right hip L[facility]NN, Dr Nelson     Osteopenia     Dexa 2004,2006,2009     Other abnormal blood chemistry 09/20/2012     psoriasis 04/21/2011     Pure hypercholesterolemia 11/22/1999     Symptomatic menopausal or female climacteric states 03/31/2003     Unspecified closed fracture of pelvis 09/20/2011     Unspecified essential hypertension 07/30/2006        Allergies   Allergen Reactions     Nabumetone Rash     Acetaminophen Nausea     Lortab     Hydrocodone Bitartrate Nausea     Lortab     Naproxen Nausea     Naprosyn     Rofecoxib      Upsets stomach  Vioxx     Current Outpatient Medications   Medication     ASPIRIN PO     Calcium Carbonate-Vit D-Min (CALCIUM 1200 PO)     Lactobacillus (ACIDOPHILUS PO)     lisinopril (PRINIVIL/ZESTRIL) 5 MG tablet     multivitamin (OCUVITE) TABS tablet     rosuvastatin (CRESTOR) 20 MG tablet     traMADol (ULTRAM) 50 MG tablet     UNABLE TO FIND     No current facility-administered medications for this visit.       ROS: 10 point ROS neg other than the symptoms noted above in the HPI.  /60 (BP Location: Right arm, Cuff Size: Adult Regular)   Pulse 81   Temp 97.4  F (36.3  " C) (Tympanic)   Ht 1.613 m (5' 3.5\")   Wt 73.9 kg (163 lb)   BMI 28.42 kg/m      General - The patient is well nourished and well developed, and appears to have good nutritional status.  Alert and oriented to person and place, answers questions and cooperates with examination appropriately.   Head and Face - Normocephalic and atraumatic, with no gross asymmetry noted.  The facial nerve is intact, with strong symmetric movements.  Voice and Breathing - The patient was breathing comfortably without the use of accessory muscles. There was no wheezing, stridor, or stertor.  The patients voice was clear and strong, and had appropriate pitch and quality.  Ears -The external auditory canals are patent, the tympanic membranes are intact without effusion, retraction or mass.  Bony landmarks are intact. Ears were examined under microscopy. No cerumen present.   Eyes - Extraocular movements intact, and the pupils were reactive to light.  Sclera were not icteric or injected, conjunctiva were pink and moist.  Mouth - Examination of the oral cavity showed pink, healthy oral mucosa. No lesions or ulcerations noted.  The tongue was mobile and midline, and the dentition were in good condition.    Throat - The walls of the oropharynx were smooth, pink, moist, symmetric, and had no lesions or ulcerations.  The tonsillar pillars and soft palate were symmetric.  The uvula was midline on elevation.    Neck - Normal midline excursion of the laryngotracheal complex during swallowing.  Full range of motion on passive movement.  Palpation of the occipital, submental, submandibular, internal jugular chain, and supraclavicular nodes did not demonstrate any abnormal lymph nodes or masses.  Palpation of the thyroid was soft and smooth, with no nodules or goiter appreciated.  The trachea was mobile and midline.  Nose - External contour is symmetric, no gross deflection or scars.  Nasal mucosa is pink and moist with no abnormal mucus.  The " septum and turbinates were evaluated:   No polyps, masses, or purulence noted on examination.  [place] Hallpike, Positive bilateral. Repositioned Right.       ASSESSMENT:    ICD-10-CM    1. Benign paroxysmal positional vertigo, bilateral H81.13    2. Sensorineural hearing loss (SNHL) of both ears H90.3      Ears are clear. No cerumen.   Return as needed for ear cleaning.   Refer to physical therapy for positional vertigo- Bilateral     Based on today's exam and history, I think that the most likely diagnosis at this point is benign paroxysmal positional vertigo.  The etiology of benign paroxysmal positional vertigo being small crystals tumbling in the semicircular canals was discussed at length.  Also, the treatment of the problem with physical therapy for canalith repositioning maneuvers was discussed.    I will send the patient to physical therapy for this to be done.      Silva Robledo PA-C  ENT  [facility]  [phone]

## 2019-05-09 NOTE — PATIENT INSTRUCTIONS
Ears are clear. No cerumen.   Return as needed for ear cleaning.   Refer to physical therapy for positional vertigo- Bilateral       Thank you for allowing iSlva Robledo PA-C and our ENT team to participate in your care.  If your medications are too expensive, please give the nurse a call.  We can possibly change this medication.  If you have a scheduling or an appointment question please contact our Health Unit Coordinator at their direct line [phone].   ALL nursing questions or concerns can be directed to your ENT nurse at: [phone] [facility]    Based on today's exam and history, I think that the most likely diagnosis at this point is benign paroxysmal positional vertigo.  The etiology of benign paroxysmal positional vertigo being small crystals tumbling in the semicircular canals was discussed at length.  Also, the treatment of the problem with physical therapy for canalith repositioning maneuvers was discussed.    I will send the patient to physical therapy for this to be done.

## 2019-05-21 ENCOUNTER — OFFICE VISIT (OUTPATIENT)
Dept: AUDIOLOGY | Facility: OTHER | Age: 79
End: 2019-05-21
Attending: AUDIOLOGIST
Payer: MEDICARE

## 2019-05-21 ENCOUNTER — TELEPHONE (OUTPATIENT)
Dept: AUDIOLOGY | Facility: OTHER | Age: 79
End: 2019-05-21

## 2019-05-21 DIAGNOSIS — H90.3 SENSORINEURAL HEARING LOSS (SNHL) OF BOTH EARS: Primary | ICD-10-CM

## 2019-05-21 PROCEDURE — 92556 SPEECH AUDIOMETRY COMPLETE: CPT | Performed by: AUDIOLOGIST

## 2019-05-21 PROCEDURE — 92552 PURE TONE AUDIOMETRY AIR: CPT | Performed by: AUDIOLOGIST

## 2019-05-21 NOTE — PROGRESS NOTES
Audiology Evaluation Completed. Please refer SCANNED AUDIOGRAM and/or TYMPANOGRAM for BACKGROUND, RESULTS, RECOMMENDATIONS.      Haley ANAYA, [facility]  Audiologist #[id number]        HEARING AID CHECK    BACKGROUND:  Iris Ellis, a 79 year old female, was seen today for an hearing aid check.  Ms. Ellis wears Binaural Unitron [place] 2 16.  Ms. Ellis reported lost right aid at home just this morning.     FINDINGS:  Visual inspection provided.   C-stop changed with new. Battery was tested and good. Good listening check.        Reviewed cleaning and preventative care. Any cleaning tools used were provided as customer courtesy.    Warranty reviewed with patient.      PLAN:  Based on patient report, no audiology appointment for adjustments needed. Ms. Ellis will return to clinic in 12 months, sooner if needed. Patient had no further questions and reports satisfaction.   Reviewed options with patient if she cannot find right hearing aid. She will call for appointment.      Chiqui Harrell  Audiology Assistant  [facility]  [phone]

## 2019-05-29 ENCOUNTER — HOSPITAL ENCOUNTER (OUTPATIENT)
Dept: PHYSICAL THERAPY | Facility: HOSPITAL | Age: 79
Setting detail: THERAPIES SERIES
End: 2019-05-29
Attending: PHYSICIAN ASSISTANT
Payer: MEDICARE

## 2019-05-29 DIAGNOSIS — H81.11 BENIGN PAROXYSMAL POSITIONAL VERTIGO, RIGHT: Primary | ICD-10-CM

## 2019-05-29 DIAGNOSIS — H81.12 BENIGN PAROXYSMAL POSITIONAL VERTIGO, LEFT: ICD-10-CM

## 2019-05-29 DIAGNOSIS — H81.13 BENIGN PAROXYSMAL POSITIONAL VERTIGO, BILATERAL: ICD-10-CM

## 2019-05-29 PROCEDURE — 95992 CANALITH REPOSITIONING PROC: CPT | Mod: GP | Performed by: PHYSICAL THERAPIST

## 2019-05-29 PROCEDURE — 97161 PT EVAL LOW COMPLEX 20 MIN: CPT | Mod: GP | Performed by: PHYSICAL THERAPIST

## 2019-05-29 PROCEDURE — 40000268 ZZH STATISTIC NO CHARGES: Performed by: PHYSICAL THERAPIST

## 2019-05-29 NOTE — PROGRESS NOTES
05/29/19 1254   Quick Adds   Quick Adds Certification;Vestibular Eval   Type of Visit Initial OP PT Evaluation   General Information   Start of Care Date 05/29/19   Referring Physician Silva Robledo PA-C   Orders Evaluate and Treat as Indicated   Additional Orders balance/vestibular   Order Date 05/29/19   Medical Diagnosis BPPV right ear, BPPV left ear   Onset of illness/injury or Date of Surgery 05/09/19  (date of MD order)   Surgical/Medical history reviewed Yes   Pertinent history of current vestibular problem (include personal factors and/or comorbidities that impact the POC)  Hearing loss   Hearing Loss Comments was told she has bilateral hearing loss, wears hearing aids   Pertinent history of current problem (include personal factors and/or comorbidities that impact the POC) Pt states on occasion when she lies down and turns her head to the side she will get dizziness with room spinning but not always.  Pt states almost everytime she goes to get her hair done she can lay back to get hair washed and does not have dizziness but when she goes to get up and walk she gets dizzy and unsteady and feels like she is drunk.  Pt states she saw ENT and had repositioning done.  Pt states this has been going on for several years.  Pt states the first time she noticed it, she was on her bed and turned her head and she would get room spinning and would go away as soon as she looks straight ahead.  Pt states the episodes at the hairdresser have started in the past 6 months.  Pt denies symptoms at other times during the day.  Pt states she has bilateral hearing loss and ringing in her ears that is intermittent but not related or changed with her dizzy episodes.  Pt states she is just getting over a cold but had no change in symptoms before, during or after the symptoms.     Pertinent Visual History  bifocals wears all the time   Prior level of functional mobility Transfers;Ambulation;ADL   Transfers independent   Ambulation  independent   ADL independent   Patient role/Employment history Retired   Living environment House/townhome   Home/Community Accessibility Comments stairs within home   Patient/Family Goals Statement get rid of dizziness so she doesn't have to look like she's drunk   Fall Risk Screen   Fall screen completed by PT   Have you fallen 2 or more times in the past year? No   Have you fallen and had an injury in the past year? No   Is patient a fall risk? No   System Outcome Measures   Outcome Measures BPPV   Dizziness Handicap Inventory (score out of 100) A decrease in score by 17.18 or greater indicates a clinically significant change in symptoms. 28   Cognitive Status Examination   Orientation orientation to person, place and time   Level of Consciousness alert   Follows Commands and Answers Questions 100% of the time   Personal Safety and Judgment intact   Memory intact   Integumentary   Integumentary No deficits were identified   Posture   Posture Forward head position   Range of Motion (ROM)   ROM Comment Bilateral UE/LE AROM WFL   Strength   Strength Comments Bilateral UE/LE strength WFL   Bed Mobility   Bed Mobility Comments independent   Transfer Skills   Transfer Comments independent   Gait   Gait Comments ambulates independently   Balance   Balance Comments Not formally assess on this date but WNL with ambulation around  department   Sensory Examination   Sensory Perception no deficits were identified   Cervicogenic Screen   Neck ROM WNL   Vertebral Artery Test Normal   Oculomotor Exam   Smooth Pursuit Normal   Saccades Normal   VOR Normal   VOR Cancellation Normal   Rapid Head Thrust Other   Rapid Head Thrust Comments corrective saccade bilateral head thrust   Convergence Testing Normal   Infrared Goggle Exam or Frenzel Lense Exam   Vestibular Suppressant in Last 24 Hours? Yes   Exam completed with Infrared Goggles   Spontaneous Nystagmus Negative   Gaze Evoked Nystagmus Negative   Head Shake Horizontal  Nystagmus Negative   Shawnee-Hallpike (right) Upbeating R torsional   Shawnee-Hallpike (right) comments onset at 15 seconds, duration 7 seconds, symptomatic   Shawnee-Hallpike (Left) Upbeating L torsional   Shawnee-Hallpike (left) comments onset 20 seconds, duraction 25 seconds, symptoms noted   HSCC Supine Roll Test (Right) Negative   HSCC Supine Roll Test (Left) Negative   BPPV Canal(s) R Posterior;L Posterior   BPPV Type Canalithasis   Planned Therapy Interventions   Planned Therapy Interventions neuromuscular re-education;balance training   Clinical Impression   Criteria for Skilled Therapeutic Interventions Met yes, treatment indicated   PT Diagnosis dizziness with bed mobility and daily activities   Influenced by the following impairments dizziness, impaired stability with walking   Functional limitations due to impairments decreased safety with ambulation, impaired tolerance for sleeping/bed mobility   Clinical Presentation Stable/Uncomplicated   Clinical Presentation Rationale clinical judgement   Clinical Decision Making (Complexity) Low complexity   Therapy Frequency 1 time/week   Predicted Duration of Therapy Intervention (days/wks) 10 weeks   Risk & Benefits of therapy have been explained Yes   Patient, Family & other staff in agreement with plan of care Yes   Clinical Impression Comments Pt presents with dizziness with with turning in bed as well as dizziness with gait instability after being at the hairdresser.  Pt found to have right and left posterior canalithiasis.  Pt would benefit from skilled PT to complete canalith repositioning procedures and treat any additional balance deficits.       Education Assessment   Preferred Learning Style Listening   Barriers to Learning No barriers   GOALS   PT Eval Goals 1;2   Goal 1   Goal Identifier STG 1   Goal Description Pt to be compliant with post procedure precautions.   Target Date 06/12/19   Goal 2   Goal Identifier LTG 1   Goal Description Pt to tolerate all bed  mobility and session at [facility] without episode of dizziness.   Target Date 08/07/19   Total Evaluation Time   PT Eval, Low Complexity Minutes (78011) 24   Therapy Certification   Certification date from 05/29/19   Certification date to 08/07/19   Medical Diagnosis BPPV right ear, BPPV left ear   Certification I certify the need for these services furnished under this plan of treatment and while under my care.  (Physician co-signature of this document indicates review and certification of the therapy plan).

## 2019-08-21 ENCOUNTER — MYC MEDICAL ADVICE (OUTPATIENT)
Dept: FAMILY MEDICINE | Facility: OTHER | Age: 79
End: 2019-08-21

## 2019-08-21 DIAGNOSIS — I10 BENIGN ESSENTIAL HYPERTENSION: Primary | ICD-10-CM

## 2019-08-21 DIAGNOSIS — E78.5 HYPERLIPIDEMIA LDL GOAL <100: ICD-10-CM

## 2019-08-21 DIAGNOSIS — R73.09 ELEVATED GLUCOSE: ICD-10-CM

## 2019-08-26 DIAGNOSIS — E78.5 HYPERLIPIDEMIA LDL GOAL <100: ICD-10-CM

## 2019-08-26 DIAGNOSIS — R73.09 ELEVATED GLUCOSE: ICD-10-CM

## 2019-08-26 DIAGNOSIS — I10 BENIGN ESSENTIAL HYPERTENSION: ICD-10-CM

## 2019-08-26 LAB
ALBUMIN SERPL-MCNC: 3.7 G/DL (ref 3.4–5)
ALP SERPL-CCNC: 82 U/L (ref 40–150)
ALT SERPL W P-5'-P-CCNC: 24 U/L (ref 0–50)
ANION GAP SERPL CALCULATED.3IONS-SCNC: 5 MMOL/L (ref 3–14)
AST SERPL W P-5'-P-CCNC: 17 U/L (ref 0–45)
BILIRUB SERPL-MCNC: 0.6 MG/DL (ref 0.2–1.3)
BUN SERPL-MCNC: 11 MG/DL (ref 7–30)
CALCIUM SERPL-MCNC: 9.2 MG/DL (ref 8.5–10.1)
CHLORIDE SERPL-SCNC: 110 MMOL/L (ref 94–109)
CHOLEST SERPL-MCNC: 172 MG/DL
CO2 SERPL-SCNC: 29 MMOL/L (ref 20–32)
CREAT SERPL-MCNC: 0.74 MG/DL (ref 0.52–1.04)
ERYTHROCYTE [DISTWIDTH] IN BLOOD BY AUTOMATED COUNT: 13.6 % (ref 10–15)
EST. AVERAGE GLUCOSE BLD GHB EST-MCNC: 120 MG/DL
GFR SERPL CREATININE-BSD FRML MDRD: 76 ML/MIN/{1.73_M2}
GLUCOSE SERPL-MCNC: 104 MG/DL (ref 70–99)
HBA1C MFR BLD: 5.8 % (ref 0–5.6)
HCT VFR BLD AUTO: 42.9 % (ref 35–47)
HDLC SERPL-MCNC: 78 MG/DL
HGB BLD-MCNC: 13.9 G/DL (ref 11.7–15.7)
LDLC SERPL CALC-MCNC: 72 MG/DL
MCH RBC QN AUTO: 29.2 PG (ref 26.5–33)
MCHC RBC AUTO-ENTMCNC: 32.4 G/DL (ref 31.5–36.5)
MCV RBC AUTO: 90 FL (ref 78–100)
NONHDLC SERPL-MCNC: 94 MG/DL
PLATELET # BLD AUTO: 380 10E9/L (ref 150–450)
POTASSIUM SERPL-SCNC: 4 MMOL/L (ref 3.4–5.3)
PROT SERPL-MCNC: 7.3 G/DL (ref 6.8–8.8)
RBC # BLD AUTO: 4.76 10E12/L (ref 3.8–5.2)
SODIUM SERPL-SCNC: 144 MMOL/L (ref 133–144)
TRIGL SERPL-MCNC: 111 MG/DL
WBC # BLD AUTO: 5.6 10E9/L (ref 4–11)

## 2019-08-26 PROCEDURE — 83036 HEMOGLOBIN GLYCOSYLATED A1C: CPT | Mod: ZL | Performed by: FAMILY MEDICINE

## 2019-08-26 PROCEDURE — 36415 COLL VENOUS BLD VENIPUNCTURE: CPT | Mod: ZL | Performed by: FAMILY MEDICINE

## 2019-08-26 PROCEDURE — 80061 LIPID PANEL: CPT | Mod: ZL | Performed by: FAMILY MEDICINE

## 2019-08-26 PROCEDURE — 40000788 ZZHCL STATISTIC ESTIMATED AVERAGE GLUCOSE: Mod: ZL | Performed by: FAMILY MEDICINE

## 2019-08-26 PROCEDURE — 80053 COMPREHEN METABOLIC PANEL: CPT | Mod: ZL | Performed by: FAMILY MEDICINE

## 2019-08-26 PROCEDURE — 85027 COMPLETE CBC AUTOMATED: CPT | Mod: ZL | Performed by: FAMILY MEDICINE

## 2019-10-18 NOTE — PROGRESS NOTES
"SUBJECTIVE:   Iris Ellis is a 79 year old female who presents for Preventive Visit.    Are you in the first 12 months of your Medicare Part B coverage?  No    Physical Health:    In general, how would you rate your overall physical health? good    Outside of work, how many days during the week do you exercise? 6-7 days/week    Outside of work, approximately how many minutes a day do you exercise?45-60 minutes    If you drink alcohol do you typically have >3 drinks per day or >7 drinks per week? No    Do you usually eat at least 4 servings of fruit and vegetables a day, include whole grains & fiber and avoid regularly eating high fat or \"junk\" foods? Yes    Do you have any problems taking medications regularly?  No    Do you have any side effects from medications? not applicable    Needs assistance for the following daily activities: no assistance needed    Which of the following safety concerns are present in your home?  none identified     Hearing impairment: No    In the past 6 months, have you been bothered by leaking of urine? no    Mental Health:    In general, how would you rate your overall mental or emotional health? good  PHQ-2 Score:      Do you feel safe in your environment? Yes    Do you have a Health Care Directive? No, advance care planning information given to patient to review.  Patient plans to discuss their wishes with loved ones or provider.      Additional concerns to address?  Dizziness after hair is being done/ off balance/ some stomach pains    Fall risk:  Fallen 2 or more times in the past year?: No  Any fall with injury in the past year?: No  click delete button to remove this line now      Do you have sleep apnea, excessive snoring or daytime drowsiness?: no    Dizziness  This occurs when she lies back to get her hair washed at the hair dressers. She has had past BPPV treated at . This only occurs with putting her head back      Abdominal pain  This has occurred the last 2 " months and is epigastric, eating makes this worse. No melana or hematochezia.  She has had ongoing constipation her whole lifetime. She has added flax seed and uses dulcolax occasionally. She is due for a colonoscopy and has been having them every 5 years due to a family history of colon cancer.         Reviewed and updated as needed this visit by clinical staff  Tobacco  Allergies  Med Hx  Surg Hx  Fam Hx  Soc Hx        Reviewed and updated as needed this visit by Provider        Social History     Tobacco Use     Smoking status: Former Smoker     Packs/day: 0.00     Types: Cigarettes     Smokeless tobacco: Never Used   Substance Use Topics     Alcohol use: Yes     Comment: rarely                           Current providers sharing in care for this patient include:   Patient Care Team:  Anna Bolaños MD as PCP - General  Anna Bolaños MD as Assigned PCP    The following health maintenance items are reviewed in Epic and correct as of today:  Health Maintenance   Topic Date Due     ZOSTER IMMUNIZATION (1 of 2) 04/18/1988     PNEUMOCOCCAL IMMUNIZATION 65+ LOW/MEDIUM RISK (1 of 2 - PCV13) 04/18/2003     MEDICARE ANNUAL WELLNESS VISIT  03/28/2014     DEXA  02/28/2019     INFLUENZA VACCINE (1) 09/01/2019     COLONOSCOPY  10/20/2019     PHQ-9  10/29/2019     FALL RISK ASSESSMENT  05/09/2020     BMP  08/26/2020     ADVANCE CARE PLANNING  04/24/2022     DTAP/TDAP/TD IMMUNIZATION (3 - Td) 03/28/2023     PHQ-2  Completed     IPV IMMUNIZATION  Aged Out     MENINGITIS IMMUNIZATION  Aged Out     BP Readings from Last 3 Encounters:   10/31/19 134/66   05/09/19 130/60   04/29/19 114/58    Wt Readings from Last 3 Encounters:   10/31/19 70.3 kg (155 lb)   05/09/19 73.9 kg (163 lb)   04/29/19 74.8 kg (165 lb)                  Patient Active Problem List   Diagnosis     Osteopenia     Knee pain     Elevated LFTs     Sleep related leg cramps     Preventative health care     Elevated glucose     Mammogram declined     Advanced  care planning/counseling discussion     Benign essential hypertension     Hyperlipidemia LDL goal <100     Anxiety     Past Surgical History:   Procedure Laterality Date     APPENDECTOMY  1944     Benign positional vertigo       CHOLECYSTECTOMY  1979     colonoscopies  1998, 2003, 2007, 2009, 9/18/2014    Colonic polyps. Repeat colonoscopy 2013     Declines further mammograms  2009     Left superior and inferior pubic rami fractures  2006     ORTHOPEDIC SURGERY  2011    total right hip. Dr. Nelson     PACs, PVCs, SVT, bradycardia on Holter Moniter  2009    Dr. Pace     Urethral dilation  2003    Urethral stricture       Social History     Tobacco Use     Smoking status: Former Smoker     Packs/day: 0.00     Types: Cigarettes     Smokeless tobacco: Never Used   Substance Use Topics     Alcohol use: Yes     Comment: rarely     Family History   Problem Relation Age of Onset     Alzheimer Disease Father      Cancer Sister         Colon     Coronary Artery Disease Sister         Heart attack     Cancer Brother         Lung, prostate, colon     Respiratory Brother      Diabetes Mother      Cancer Other         Colon-family h/o         Current Outpatient Medications   Medication Sig Dispense Refill     ASPIRIN PO Take 81 mg by mouth daily       Calcium Carbonate-Vit D-Min (CALCIUM 1200 PO) Take  by mouth. Take 1 daily       Lactobacillus (ACIDOPHILUS PO) Take  by mouth. 1 daily       lisinopril (PRINIVIL/ZESTRIL) 5 MG tablet Take 1 tablet (5 mg) by oral route every day 90 tablet 3     multivitamin (OCUVITE) TABS tablet Take 1 tablet by mouth daily 90 each 3     rosuvastatin (CRESTOR) 20 MG tablet Take 20 mg by mouth daily       UNABLE TO FIND 1 tablet daily MEDICATION NAME: Tumeric       Allergies   Allergen Reactions     Nabumetone Rash     Acetaminophen Nausea     Lortab     Hydrocodone Bitartrate Nausea     Lortab     Naproxen Nausea     Naprosyn     Rofecoxib      Upsets stomach  Vioxx     Mammogram declined  "    ROS:  CONSTITUTIONAL: NEGATIVE for fever, chills, change in weight  INTEGUMENTARY/SKIN: NEGATIVE for worrisome rashes, moles or lesions  EYES: NEGATIVE for vision changes or irritation  ENT/MOUTH: NEGATIVE for ear, mouth and throat problems  RESP: NEGATIVE for significant cough or SOB  BREAST: NEGATIVE for masses, tenderness or discharge  CV: NEGATIVE for chest pain, palpitations or peripheral edema  GI: NEGATIVE for nausea, abdominal pain, heartburn, or change in bowel habits  : NEGATIVE for frequency, dysuria, or hematuria  MUSCULOSKELETAL: NEGATIVE for significant arthralgias or myalgia  NEURO: NEGATIVE for weakness, dizziness or paresthesias  ENDOCRINE: NEGATIVE for temperature intolerance, skin/hair changes  HEME: NEGATIVE for bleeding problems  PSYCHIATRIC: NEGATIVE for changes in mood or affect    OBJECTIVE:   /66 (BP Location: Left arm, Patient Position: Sitting, Cuff Size: Adult Regular)   Pulse 70   Temp 97.2  F (36.2  C) (Tympanic)   Ht 1.6 m (5' 3\")   Wt 70.3 kg (155 lb)   SpO2 99%   BMI 27.46 kg/m   Estimated body mass index is 27.46 kg/m  as calculated from the following:    Height as of this encounter: 1.6 m (5' 3\").    Weight as of this encounter: 70.3 kg (155 lb).  EXAM:   GENERAL APPEARANCE: healthy, alert and no distress  EYES: Eyes grossly normal to inspection, PERRL and conjunctivae and sclerae normal  HENT: ear canals and TM's normal, nose and mouth without ulcers or lesions, oropharynx clear and oral mucous membranes moist  NECK: no adenopathy, no asymmetry, masses, or scars and thyroid normal to palpation, no carotid bruits  RESP: lungs clear to auscultation - no rales, rhonchi or wheezes  BREAST: normal without masses, tenderness or nipple discharge and no palpable axillary masses or adenopathy  CV: regular rate and rhythm, normal S1 S2, no S3 or S4, no murmur, click or rub, no peripheral edema and peripheral pulses strong  ABDOMEN: soft, nontender, no hepatosplenomegaly, " no masses and bowel sounds normal  MS: no musculoskeletal defects are noted and gait is age appropriate without ataxia  SKIN: no suspicious lesions or rashes  NEURO: Normal strength and tone, sensory exam grossly normal, mentation intact and speech normal  PSYCH: mentation appears normal and affect normal/bright        ASSESSMENT / PLAN:   1. Routine general medical examination at a health care facility  Mammogram declined    2. Elevated glucose  Labs were done in august and reviewed with her at that time, continue to watch diet    3. Benign essential hypertension  Good control, continue medications    4. Hyperlipidemia LDL goal <100  Reviewed previous labs    5. Estrogen deficiency  Due for dexa scan  - DX Hip/Pelvis/Spine; Future    6. Abdominal pain, epigastric  Will evaluate further with ultrasound to rule out pancreatic mass, or other abnormality. She is s/p cholecystectomy  - US Abdomen Complete; Future    7. Benign paroxysmal positional vertigo, unspecified laterality  Refer for PT, discussed pathophysiology  - PHYSICAL THERAPY REFERRAL; Future    8. Need for prophylactic vaccination and inoculation against influenza  Given today  - INFLUENZA (HIGH DOSE) 3 VALENT VACCINE [38690]  - Pneumococcal vaccine 13 valent PCV13 IM (Prevnar) [66759]  - Vaccine Administration, Each Additional [25712]    9. Mammogram declined        End of Life Planning:  Patient currently has an advanced directive: No.  I have verified the patient's ablity to prepare an advanced directive/make health care decisions.  Literature was provided to assist patient in preparing an advanced directive.    COUNSELING:  Reviewed preventive health counseling, as reflected in patient instructions       Regular exercise       Healthy diet/nutrition       Immunizations    Vaccinated for: Pneumococcal , influenza           Osteoporosis Prevention/Bone Health       Colon cancer screening    Estimated body mass index is 27.46 kg/m  as calculated from the  "following:    Height as of this encounter: 1.6 m (5' 3\").    Weight as of this encounter: 70.3 kg (155 lb).         reports that she has quit smoking. Her smoking use included cigarettes. She smoked 0.00 packs per day. She has never used smokeless tobacco.      Appropriate preventive services were discussed with this patient, including applicable screening as appropriate for cardiovascular disease, diabetes, osteopenia/osteoporosis, and glaucoma.  As appropriate for age/gender, discussed screening for colorectal cancer, prostate cancer, breast cancer, and cervical cancer. Checklist reviewing preventive services available has been given to the patient.    Reviewed patients plan of care and provided an AVS. The Basic Care Plan (routine screening as documented in Health Maintenance) for Iris meets the Care Plan requirement. This Care Plan has been established and reviewed with the Patient.    Counseling Resources:  ATP IV Guidelines  Pooled Cohorts Equation Calculator  Breast Cancer Risk Calculator  FRAX Risk Assessment  ICSI Preventive Guidelines  Dietary Guidelines for Americans, 2010  USDA's MyPlate  ASA Prophylaxis  Lung CA Screening    Anna Bolaños MD  [facility] [place]  "

## 2019-10-18 NOTE — PATIENT INSTRUCTIONS
Preventive Health Recommendations    See your health care provider every year to    Review health changes.     Discuss preventive care.      Review your medicines if your doctor has prescribed any.      You no longer need a yearly Pap test unless you've had an abnormal Pap test in the past 10 years. If you have vaginal symptoms, such as bleeding or discharge, be sure to talk with your provider about a Pap test.      Every 1 to 2 years, have a mammogram.  If you are over 69, talk with your health care provider about whether or not you want to continue having screening mammograms.      Every 10 years, have a colonoscopy. Or, have a yearly FIT test (stool test). These exams will check for colon cancer.       Have a cholesterol test every 5 years, or more often if your doctor advises it.       Have a diabetes test (fasting glucose) every three years. If you are at risk for diabetes, you should have this test more often.       At age 65, have a bone density scan (DEXA) to check for osteoporosis (brittle bone disease).    Shots:    Get a flu shot each year.    Get a tetanus shot every 10 years.    Talk to your doctor about your pneumonia vaccines. There are now two you should receive - Pneumovax (PPSV 23) and Prevnar (PCV 13).    Talk to your pharmacist about the shingles vaccine.    Talk to your doctor about the hepatitis B vaccine.    Nutrition:     Eat at least 5 servings of fruits and vegetables each day.      Eat whole-grain bread, whole-wheat pasta and brown rice instead of white grains and rice.      Get adequate about Calcium and Vitamin D.     Lifestyle    Exercise at least 150 minutes a week (30 minutes a day, 5 days a week). This will help you control your weight and prevent disease.      Limit alcohol to one drink per day.      No smoking.       Wear sunscreen to prevent skin cancer.       See your dentist twice a year for an exam and cleaning.      See your eye doctor every 1 to 2 years to screen for  conditions such as glaucoma, macular degeneration, cataracts, etc.    Personalized Prevention Plan  You are due for the preventive services outlined below.  Your care team is available to assist you in scheduling these services.  If you have already completed any of these items, please share that information with your care team to update in your medical record.    Health Maintenance Due   Topic Date Due     Zoster (Shingles) Vaccine (1 of 2) 04/18/1988     Pneumococcal Vaccine (1 of 2 - PCV13) 04/18/2003     Annual Wellness Visit  03/28/2014     Osteoporosis Screening  02/28/2019     Flu Vaccine (1) 09/01/2019     Colonscopy  10/20/2019     Depression Assessment  10/29/2019

## 2019-10-31 ENCOUNTER — OFFICE VISIT (OUTPATIENT)
Dept: FAMILY MEDICINE | Facility: OTHER | Age: 79
End: 2019-10-31
Attending: FAMILY MEDICINE
Payer: MEDICARE

## 2019-10-31 VITALS
BODY MASS INDEX: 27.46 KG/M2 | DIASTOLIC BLOOD PRESSURE: 66 MMHG | SYSTOLIC BLOOD PRESSURE: 134 MMHG | OXYGEN SATURATION: 99 % | TEMPERATURE: 97.2 F | HEIGHT: 63 IN | HEART RATE: 70 BPM | WEIGHT: 155 LBS

## 2019-10-31 DIAGNOSIS — E78.5 HYPERLIPIDEMIA LDL GOAL <100: ICD-10-CM

## 2019-10-31 DIAGNOSIS — R10.13 ABDOMINAL PAIN, EPIGASTRIC: ICD-10-CM

## 2019-10-31 DIAGNOSIS — Z00.00 ROUTINE GENERAL MEDICAL EXAMINATION AT A HEALTH CARE FACILITY: Primary | ICD-10-CM

## 2019-10-31 DIAGNOSIS — R73.09 ELEVATED GLUCOSE: ICD-10-CM

## 2019-10-31 DIAGNOSIS — H81.10 BENIGN PAROXYSMAL POSITIONAL VERTIGO, UNSPECIFIED LATERALITY: ICD-10-CM

## 2019-10-31 DIAGNOSIS — I10 BENIGN ESSENTIAL HYPERTENSION: ICD-10-CM

## 2019-10-31 DIAGNOSIS — Z23 NEED FOR PROPHYLACTIC VACCINATION AND INOCULATION AGAINST INFLUENZA: ICD-10-CM

## 2019-10-31 DIAGNOSIS — E28.39 ESTROGEN DEFICIENCY: ICD-10-CM

## 2019-10-31 DIAGNOSIS — Z53.20 MAMMOGRAM DECLINED: ICD-10-CM

## 2019-10-31 PROCEDURE — G0008 ADMIN INFLUENZA VIRUS VAC: HCPCS

## 2019-10-31 PROCEDURE — G0439 PPPS, SUBSEQ VISIT: HCPCS | Performed by: FAMILY MEDICINE

## 2019-10-31 PROCEDURE — 90472 IMMUNIZATION ADMIN EACH ADD: CPT | Performed by: FAMILY MEDICINE

## 2019-10-31 PROCEDURE — 90670 PCV13 VACCINE IM: CPT

## 2019-10-31 PROCEDURE — 90662 IIV NO PRSV INCREASED AG IM: CPT

## 2019-10-31 ASSESSMENT — ANXIETY QUESTIONNAIRES
IF YOU CHECKED OFF ANY PROBLEMS ON THIS QUESTIONNAIRE, HOW DIFFICULT HAVE THESE PROBLEMS MADE IT FOR YOU TO DO YOUR WORK, TAKE CARE OF THINGS AT HOME, OR GET ALONG WITH OTHER PEOPLE: NOT DIFFICULT AT ALL
1. FEELING NERVOUS, ANXIOUS, OR ON EDGE: NOT AT ALL
4. TROUBLE RELAXING: NOT AT ALL
GAD7 TOTAL SCORE: 0
5. BEING SO RESTLESS THAT IT IS HARD TO SIT STILL: NOT AT ALL
2. NOT BEING ABLE TO STOP OR CONTROL WORRYING: NOT AT ALL
3. WORRYING TOO MUCH ABOUT DIFFERENT THINGS: NOT AT ALL
6. BECOMING EASILY ANNOYED OR IRRITABLE: NOT AT ALL
7. FEELING AFRAID AS IF SOMETHING AWFUL MIGHT HAPPEN: NOT AT ALL

## 2019-10-31 ASSESSMENT — PAIN SCALES - GENERAL: PAINLEVEL: NO PAIN (0)

## 2019-10-31 ASSESSMENT — MIFFLIN-ST. JEOR: SCORE: 1137.21

## 2019-10-31 ASSESSMENT — PATIENT HEALTH QUESTIONNAIRE - PHQ9: SUM OF ALL RESPONSES TO PHQ QUESTIONS 1-9: 0

## 2019-11-02 ENCOUNTER — HEALTH MAINTENANCE LETTER (OUTPATIENT)
Age: 79
End: 2019-11-02

## 2019-11-02 ASSESSMENT — ANXIETY QUESTIONNAIRES: GAD7 TOTAL SCORE: 0

## 2019-11-08 ENCOUNTER — HOSPITAL ENCOUNTER (OUTPATIENT)
Dept: ULTRASOUND IMAGING | Facility: HOSPITAL | Age: 79
Discharge: HOME | End: 2019-11-08
Attending: FAMILY MEDICINE | Admitting: FAMILY MEDICINE
Payer: MEDICARE

## 2019-11-08 ENCOUNTER — HOSPITAL ENCOUNTER (OUTPATIENT)
Dept: BONE DENSITY | Facility: HOSPITAL | Age: 79
End: 2019-11-08
Attending: FAMILY MEDICINE
Payer: MEDICARE

## 2019-11-08 DIAGNOSIS — E28.39 ESTROGEN DEFICIENCY: ICD-10-CM

## 2019-11-08 DIAGNOSIS — M81.0 AGE-RELATED OSTEOPOROSIS WITHOUT CURRENT PATHOLOGICAL FRACTURE: ICD-10-CM

## 2019-11-08 DIAGNOSIS — R10.13 ABDOMINAL PAIN, EPIGASTRIC: ICD-10-CM

## 2019-11-08 DIAGNOSIS — M81.0 AGE-RELATED OSTEOPOROSIS WITHOUT CURRENT PATHOLOGICAL FRACTURE: Primary | ICD-10-CM

## 2019-11-08 PROCEDURE — 76700 US EXAM ABDOM COMPLETE: CPT | Mod: TC

## 2019-11-08 PROCEDURE — 77080 DXA BONE DENSITY AXIAL: CPT | Mod: TC

## 2019-11-08 PROCEDURE — 77081 DXA BONE DENSITY APPENDICULR: CPT | Mod: TC,59,GZ

## 2019-11-08 RX ORDER — ALENDRONATE SODIUM 70 MG/1
70 TABLET ORAL
Qty: 12 TABLET | Refills: 3 | Status: SHIPPED | OUTPATIENT
Start: 2019-11-08 | End: 2019-11-11

## 2019-11-10 DIAGNOSIS — K29.00 ACUTE GASTRITIS WITHOUT HEMORRHAGE, UNSPECIFIED GASTRITIS TYPE: Primary | ICD-10-CM

## 2019-11-10 RX ORDER — ESOMEPRAZOLE MAGNESIUM 40 MG/1
40 CAPSULE, DELAYED RELEASE ORAL
Qty: 90 CAPSULE | Refills: 1 | Status: SHIPPED | OUTPATIENT
Start: 2019-11-10 | End: 2020-11-02

## 2019-11-18 ENCOUNTER — TRANSFERRED RECORDS (OUTPATIENT)
Dept: HEALTH INFORMATION MANAGEMENT | Facility: CLINIC | Age: 79
End: 2019-11-18

## 2019-11-18 LAB — COLOGUARD-ABSTRACT: NEGATIVE

## 2019-11-25 ENCOUNTER — MYC MEDICAL ADVICE (OUTPATIENT)
Dept: FAMILY MEDICINE | Facility: OTHER | Age: 79
End: 2019-11-25

## 2019-11-25 DIAGNOSIS — E78.5 HYPERLIPIDEMIA LDL GOAL <100: Primary | ICD-10-CM

## 2019-11-25 RX ORDER — ROSUVASTATIN CALCIUM 20 MG/1
20 TABLET, COATED ORAL DAILY
Qty: 14 TABLET | Refills: 3 | Status: SHIPPED | OUTPATIENT
Start: 2019-11-25 | End: 2020-11-02

## 2019-11-25 RX ORDER — ROSUVASTATIN CALCIUM 20 MG/1
20 TABLET, COATED ORAL DAILY
Qty: 90 TABLET | Refills: 3 | Status: SHIPPED | OUTPATIENT
Start: 2019-11-25 | End: 2019-11-25

## 2019-11-25 NOTE — TELEPHONE ENCOUNTER
See my chart message. Medication pended to [facility] also needs short term supply sent to [facility].

## 2019-12-02 ENCOUNTER — TELEPHONE (OUTPATIENT)
Dept: FAMILY MEDICINE | Facility: OTHER | Age: 79
End: 2019-12-02

## 2019-12-04 ENCOUNTER — HOSPITAL ENCOUNTER (OUTPATIENT)
Dept: PHYSICAL THERAPY | Facility: HOSPITAL | Age: 79
Setting detail: THERAPIES SERIES
End: 2019-12-04
Attending: FAMILY MEDICINE
Payer: MEDICARE

## 2019-12-04 DIAGNOSIS — H81.10 BENIGN PAROXYSMAL POSITIONAL VERTIGO, UNSPECIFIED LATERALITY: ICD-10-CM

## 2019-12-04 PROCEDURE — 40000268 ZZH STATISTIC NO CHARGES: Performed by: PHYSICAL THERAPIST

## 2019-12-04 PROCEDURE — 97161 PT EVAL LOW COMPLEX 20 MIN: CPT | Mod: GP | Performed by: PHYSICAL THERAPIST

## 2019-12-05 NOTE — PROGRESS NOTES
12/04/19 0904   Quick Adds   Quick Adds Certification;Vestibular Eval   Type of Visit Initial OP PT Evaluation   General Information   Start of Care Date 12/04/19   Referring Physician Dr. Anna Bolaños   Orders Evaluate and Treat as Indicated   Additional Orders balance/vestibular   Order Date 10/31/19   Medical Diagnosis BPPV - unspecified laterality   Onset of illness/injury or Date of Surgery 10/31/19   Surgical/Medical history reviewed Yes   Pertinent history of current vestibular problem (include personal factors and/or comorbidities that impact the POC)  Hearing loss   Hearing Loss Comments states she has hearing aids but does not wear them   Pertinent history of current problem (include personal factors and/or comorbidities that impact the POC) Pt was seen by this writer in May 2019 for dizziness.  At that time pt was positive for bilateral posterior canalithiasis and treated on date of evaluation but did not return for additional follows.  Pt states that she no longer gets dizzy with rolling in bed like she used to but states she continues to get significant dizziness when in the beauty chair.  Pt reports when she gets up from chair her balance is affected and states symptoms can last for as long as a half hour or more after.  Pt denies any deficits with her balance at other times other then after being at beauty shop.   Pertinent Visual History  wears bifocals all the time   Prior level of function comment independent with all   Previous/Current Treatment Physical Therapy   Improvement after PT Moderate   Patient role/Employment history Retired   Living environment House/townhome   Home/Community Accessibility Comments stairs within home   Patient/Family Goals Statement get rid of dizziness at [employer]   Fall Risk Screen   Fall screen completed by PT   Have you fallen 2 or more times in the past year? No   Have you fallen and had an injury in the past year? No   Is patient a fall risk? No   System  Outcome Measures   Outcome Measures BPPV   Dizziness Handicap Inventory (score out of 100) A decrease in score by 17.18 or greater indicates a clinically significant change in symptoms. 30   Pain   Patient currently in pain No   Cognitive Status Examination   Orientation orientation to person, place and time   Level of Consciousness alert   Follows Commands and Answers Questions 100% of the time   Personal Safety and Judgment intact   Memory intact   Integumentary   Integumentary No deficits were identified   Posture   Posture Forward head position   Range of Motion (ROM)   ROM Comment UE/LE AROM WFL   Strength   Strength Comments UE/LE strength WFL   Bed Mobility   Bed Mobility Comments independent   Transfer Skills   Transfer Comments independent   Gait   Gait Comments independent   Balance   Balance Comments not formally assessed on this visit, will assess as indicated at future sessions   Sensory Examination   Sensory Perception no deficits were identified   Cervicogenic Screen   Neck ROM WNL   Vertebral Artery Test Normal   Oculomotor Exam   Smooth Pursuit Normal   Saccades Normal   VOR Cancellation Normal   Rapid Head Thrust Normal   Convergence Testing Normal   Infrared Goggle Exam or Frenzel Lense Exam   Vestibular Suppressant in Last 24 Hours? No   Exam completed with Infrared Goggles   Spontaneous Nystagmus Negative   Gaze Evoked Nystagmus Negative   Head Shake Horizontal Nystagmus Negative   Benton-Hallpike (right) Negative   Shawnee-Hallpike (right) comments x3 for nystagmus or symptoms   Shawnee-Hallpike (Left) Negative   Shawnee-Hallpike (left) comments x3 for nystagmus or symptoms   HSCC Supine Roll Test (Right) Negative   HSCC Supine Roll Test (Right) Comments x3 for nystagmus or symptoms   HSCC Supine Roll Test (Left) Negative   HSCC Supine Roll Test (Left) Comments  x3 for nystagmus or symptoms   Planned Therapy Interventions   Planned Therapy Interventions neuromuscular re-education;balance training;visual  perception   Clinical Impression   Criteria for Skilled Therapeutic Interventions Met yes, treatment indicated   PT Diagnosis dizziness with beauty shop positioning   Influenced by the following impairments dizziness, impaired balance during dizzy episodes   Functional limitations due to impairments decreased safety and stability after [employer] sessions.   Clinical Presentation Evolving/Changing   Clinical Presentation Rationale clinical judgement   Clinical Decision Making (Complexity) Low complexity   Therapy Frequency 1 time/week   Predicted Duration of Therapy Intervention (days/wks) 10 weeks   Risk & Benefits of therapy have been explained Yes   Patient, Family & other staff in agreement with plan of care Yes   Clinical Impression Comments Pt returns to PT with continued complaints of dizziness that is triggered after getting hair washed at [employer].  Pt was evaluated in May 2019 and found to be positive for bilateral BPPV at that time with treatment completed during that session but did not return for any follow ups.  At this time pt negative for BPPV, with no nystagmus or symptoms provoked during PT evaluation today.  Discussed there may be a component of structural issues when in [employer].  Pt will be going to [employer] tomorrow, will see how pt responds and follow back upon Monday for repeat testing.     Education Assessment   Preferred Learning Style Listening   Barriers to Learning No barriers   GOALS   PT Eval Goals 1   Goal 1   Goal Identifier LTG 1   Goal Description Pt to report at least a 50% decrease in occurences of dizziness while at beauty shop.     Target Date 02/12/20   Total Evaluation Time   PT Eval, Low Complexity Minutes (89953) 45   Therapy Certification   Certification date from 09/04/19   Certification date to 02/12/20   Medical Diagnosis BPPV - unspecified laterality   Certification I certify the need for these services furnished under this plan of treatment and while under  my care.  (Physician co-signature of this document indicates review and certification of the therapy plan).

## 2019-12-16 ENCOUNTER — HOSPITAL ENCOUNTER (OUTPATIENT)
Dept: PHYSICAL THERAPY | Facility: HOSPITAL | Age: 79
Setting detail: THERAPIES SERIES
End: 2019-12-16
Attending: FAMILY MEDICINE
Payer: MEDICARE

## 2019-12-16 PROCEDURE — 97110 THERAPEUTIC EXERCISES: CPT | Mod: GP | Performed by: PHYSICAL THERAPIST

## 2019-12-20 ENCOUNTER — HOSPITAL ENCOUNTER (OUTPATIENT)
Dept: PHYSICAL THERAPY | Facility: HOSPITAL | Age: 79
Setting detail: THERAPIES SERIES
End: 2019-12-20
Attending: FAMILY MEDICINE
Payer: MEDICARE

## 2019-12-20 PROCEDURE — 97110 THERAPEUTIC EXERCISES: CPT | Mod: GP | Performed by: PHYSICAL THERAPIST

## 2019-12-23 ENCOUNTER — HOSPITAL ENCOUNTER (OUTPATIENT)
Dept: PHYSICAL THERAPY | Facility: HOSPITAL | Age: 79
Setting detail: THERAPIES SERIES
End: 2019-12-23
Attending: FAMILY MEDICINE
Payer: MEDICARE

## 2019-12-23 PROCEDURE — 97110 THERAPEUTIC EXERCISES: CPT | Mod: GP | Performed by: PHYSICAL THERAPIST

## 2020-01-06 ENCOUNTER — HOSPITAL ENCOUNTER (OUTPATIENT)
Dept: PHYSICAL THERAPY | Facility: HOSPITAL | Age: 80
Setting detail: THERAPIES SERIES
End: 2020-01-06
Attending: FAMILY MEDICINE
Payer: MEDICARE

## 2020-01-06 PROCEDURE — 97110 THERAPEUTIC EXERCISES: CPT | Mod: GP | Performed by: PHYSICAL THERAPIST

## 2020-01-06 PROCEDURE — 97112 NEUROMUSCULAR REEDUCATION: CPT | Mod: GP | Performed by: PHYSICAL THERAPIST

## 2020-01-08 ENCOUNTER — HOSPITAL ENCOUNTER (OUTPATIENT)
Dept: PHYSICAL THERAPY | Facility: HOSPITAL | Age: 80
Setting detail: THERAPIES SERIES
End: 2020-01-08
Attending: FAMILY MEDICINE
Payer: MEDICARE

## 2020-01-08 PROCEDURE — 97110 THERAPEUTIC EXERCISES: CPT | Mod: GP | Performed by: PHYSICAL THERAPIST

## 2020-01-15 ENCOUNTER — HOSPITAL ENCOUNTER (OUTPATIENT)
Dept: PHYSICAL THERAPY | Facility: HOSPITAL | Age: 80
Setting detail: THERAPIES SERIES
End: 2020-01-15
Attending: FAMILY MEDICINE
Payer: MEDICARE

## 2020-01-15 PROCEDURE — 97110 THERAPEUTIC EXERCISES: CPT | Mod: GP | Performed by: PHYSICAL THERAPIST

## 2020-01-17 NOTE — TELEPHONE ENCOUNTER
2:18 PM    Reason for Call: OVERBOOK    Patient is having the following symptoms: Patient had a lot of chest discomfort on 01/22/2017, she would like to be checked out to make sure it wasn't a heart attack.for 2 days.    The patient is requesting an appointment for Anna Bolaños     Was an appointment offered for this call? Yes    Preferred method for responding to this message: Telephone Call    If we cannot reach you directly, may we leave a detailed response at the number you provided? Yes    Can this message wait until your PCP/provider returns, if unavailable today? n/a    Aniyah Juárez     Trilobed Flap Text: The defect edges were debeveled with a #15 scalpel blade.  Given the location of the defect and the proximity to free margins a trilobed flap was deemed most appropriate.  Using a sterile surgical marker, an appropriate trilobed flap drawn around the defect.    The area thus outlined was incised deep to adipose tissue with a #15 scalpel blade.  The skin margins were undermined to an appropriate distance in all directions utilizing iris scissors.

## 2020-01-20 ENCOUNTER — HOSPITAL ENCOUNTER (OUTPATIENT)
Dept: PHYSICAL THERAPY | Facility: HOSPITAL | Age: 80
Setting detail: THERAPIES SERIES
End: 2020-01-20
Attending: FAMILY MEDICINE
Payer: MEDICARE

## 2020-01-20 PROCEDURE — 97110 THERAPEUTIC EXERCISES: CPT | Mod: GP | Performed by: PHYSICAL THERAPIST

## 2020-01-22 ENCOUNTER — HOSPITAL ENCOUNTER (OUTPATIENT)
Dept: PHYSICAL THERAPY | Facility: HOSPITAL | Age: 80
Setting detail: THERAPIES SERIES
End: 2020-01-22
Attending: FAMILY MEDICINE
Payer: MEDICARE

## 2020-01-22 PROCEDURE — 97110 THERAPEUTIC EXERCISES: CPT | Mod: GP | Performed by: PHYSICAL THERAPIST

## 2020-01-29 ENCOUNTER — HOSPITAL ENCOUNTER (OUTPATIENT)
Dept: PHYSICAL THERAPY | Facility: HOSPITAL | Age: 80
Setting detail: THERAPIES SERIES
End: 2020-01-29
Attending: FAMILY MEDICINE
Payer: MEDICARE

## 2020-01-29 PROCEDURE — 97110 THERAPEUTIC EXERCISES: CPT | Mod: GP | Performed by: PHYSICAL THERAPIST

## 2020-01-30 NOTE — PROGRESS NOTES
Outpatient Physical Therapy Progress Note     Patient: Iris Ellis  : 1940    Beginning/End Dates of Reporting Period:  2019 to 2020    Referring Provider: Dr. Anna Bolaños    Therapy Diagnosis: dizziness with beauty shop positioning     Client Self Report: Pt reports she has had some increased knee pain.  States she is doing more on her stairs with leading with L LE but still notes fatigue.    Objective Measurements:  Objective Measure: MMT  Details: R LE strength 4+/5 in glutes, 4+/5 in quad and HS compared to 5/5 on left.                                     Outcome Measures (most recent score):      Goals:  Goal Identifier LTG 1   Goal Description Pt to report at least a 50% decrease in occurences of dizziness while at beauty shop.     Target Date 20   Date Met      Progress:         Progress Toward Goals:   Progress this reporting period: Pt is making slow progress with strength.  Pt continues to have noted difficulty with ascending stairs leading with L LE however does remarkably better when she is cued to contract glutes.  Pt has fairly significant glut weankess contributing to her instability with stairs.  Working on strengthening to improve this with small but notable changes present.  Ongoing skilled PT indicated to continue to progress LE strengthening and improve overall balance.    Plan:  Continue therapy per current plan of care.    Discharge:  No

## 2020-02-03 ENCOUNTER — HOSPITAL ENCOUNTER (OUTPATIENT)
Dept: PHYSICAL THERAPY | Facility: HOSPITAL | Age: 80
Setting detail: THERAPIES SERIES
End: 2020-02-03
Attending: FAMILY MEDICINE
Payer: MEDICARE

## 2020-02-03 PROCEDURE — 97110 THERAPEUTIC EXERCISES: CPT | Mod: GP | Performed by: PHYSICAL THERAPIST

## 2020-02-12 ENCOUNTER — HOSPITAL ENCOUNTER (OUTPATIENT)
Dept: PHYSICAL THERAPY | Facility: HOSPITAL | Age: 80
Setting detail: THERAPIES SERIES
End: 2020-02-12
Attending: FAMILY MEDICINE
Payer: MEDICARE

## 2020-02-12 PROCEDURE — 97110 THERAPEUTIC EXERCISES: CPT | Mod: GP | Performed by: PHYSICAL THERAPIST

## 2020-02-14 ENCOUNTER — HOSPITAL ENCOUNTER (OUTPATIENT)
Dept: PHYSICAL THERAPY | Facility: HOSPITAL | Age: 80
Setting detail: THERAPIES SERIES
End: 2020-02-14
Attending: FAMILY MEDICINE
Payer: MEDICARE

## 2020-02-14 PROCEDURE — 97110 THERAPEUTIC EXERCISES: CPT | Mod: GP | Performed by: PHYSICAL THERAPIST

## 2020-02-17 ENCOUNTER — HOSPITAL ENCOUNTER (OUTPATIENT)
Dept: PHYSICAL THERAPY | Facility: HOSPITAL | Age: 80
Setting detail: THERAPIES SERIES
End: 2020-02-17
Attending: FAMILY MEDICINE
Payer: MEDICARE

## 2020-02-17 PROCEDURE — 97110 THERAPEUTIC EXERCISES: CPT | Mod: GP | Performed by: PHYSICAL THERAPIST

## 2020-02-19 ENCOUNTER — HOSPITAL ENCOUNTER (OUTPATIENT)
Dept: PHYSICAL THERAPY | Facility: HOSPITAL | Age: 80
Setting detail: THERAPIES SERIES
End: 2020-02-19
Attending: FAMILY MEDICINE
Payer: MEDICARE

## 2020-02-19 PROCEDURE — 97110 THERAPEUTIC EXERCISES: CPT | Mod: GP | Performed by: PHYSICAL THERAPIST

## 2020-03-30 NOTE — PROGRESS NOTES
"Outpatient Physical Therapy Discharge Note     Patient: Iris Ellis  : 1940    Beginning/End Dates of Reporting Period:  2019 to 3/30/2020    Referring Provider: Dr. Anna Bolaños    Therapy Diagnosis: dizziness with beauty shop positioning     Client Self Report: Pt reporting more clicking in her right knee today.  Pt does states she has minimal episodes of \"dizziness\" when getting up at [employer].    Objective Measurements:         Outcome Measures (most recent score):      Goals:  Goal Identifier LTG 1   Goal Description Pt to report at least a 50% decrease in occurences of dizziness while at beauty shop.     Target Date 20   Date Met  20   Progress:       Progress Toward Goals:   Progress this reporting period: Pt did have noted improvement in dizziness and balance however tried to work through instabilities in her hip due to glut and R LE weakness.  Pt has also made significant improvements in strength from initially being able to complete sit>stand transfer without UE support to now completing 30 reps with 3lb weight in bilateral UEs.  However pt states she is developing increased pain at times now in knee and hip.  At this time progress is plateuing in regards to hip/knee pain and recommend she follow up with orthopedics.     Plan:  Discharge from therapy.    Discharge:    Reason for Discharge: Patient has met all goals.    Equipment Issued:     Discharge Plan: Patient to continue home program.  Other services: follow up with orthopedics regarding hip pain.  "

## 2020-03-30 NOTE — PROGRESS NOTES
Outpatient Physical Therapy Discharge Note     Patient: Iris Ellis  : 1940    Beginning/End Dates of Reporting Period:  2019 to 2019    Referring Provider: Silva Robledo PA-C    Therapy Diagnosis: dizziness with bed mobility and daily activities     Client Self Report: See PT eval    Objective Measurements:      Outcome Measures (most recent score):  Dizziness Handicap Inventory (score out of 100) A decrease in score by 17.18 or greater indicates a clinically significant change in symptoms.: 28         Goals:  Goal Identifier STG 1   Goal Description Pt to be compliant with post procedure precautions.   Target Date 19   Date Met      Progress:     Goal Identifier LTG 1   Goal Description Pt to tolerate all bed mobility and session at [facility] without episode of dizziness.   Target Date 19   Date Met      Progress:         Progress Toward Goals:   Not assessed this period.  Pt was treated for BPPV on evaluation and cancelled remainder of sessions stating she didn't need them.    Plan:  Discharge from therapy.    Discharge:    Reason for Discharge: Patient chooses to discontinue therapy, states symptoms resolved    Equipment Issued:     Discharge Plan: follow up prn

## 2020-07-23 DIAGNOSIS — H91.93 DECREASED HEARING OF BOTH EARS: Primary | ICD-10-CM

## 2020-07-24 ENCOUNTER — ALLIED HEALTH/NURSE VISIT (OUTPATIENT)
Dept: AUDIOLOGY | Facility: OTHER | Age: 80
End: 2020-07-24
Attending: AUDIOLOGIST
Payer: MEDICARE

## 2020-07-24 ENCOUNTER — OFFICE VISIT (OUTPATIENT)
Dept: AUDIOLOGY | Facility: OTHER | Age: 80
End: 2020-07-24
Attending: AUDIOLOGIST
Payer: MEDICARE

## 2020-07-24 DIAGNOSIS — H90.3 SENSORINEURAL HEARING LOSS (SNHL) OF BOTH EARS: Primary | ICD-10-CM

## 2020-07-24 DIAGNOSIS — H91.93 DECREASED HEARING OF BOTH EARS: ICD-10-CM

## 2020-07-24 PROCEDURE — V5299 HEARING SERVICE: HCPCS

## 2020-07-24 PROCEDURE — 92552 PURE TONE AUDIOMETRY AIR: CPT | Performed by: AUDIOLOGIST

## 2020-07-24 PROCEDURE — 92556 SPEECH AUDIOMETRY COMPLETE: CPT | Performed by: AUDIOLOGIST

## 2020-07-24 NOTE — PROGRESS NOTES
HEARING AID CHECK    BACKGROUND:  Iris Ellis, a 80 year old female, was seen today for an hearing aid check.  Ms. Ellis wears Binaural Unitron Moxi2 16 hearing aids.  Ms. Ellis reported no concerns.    FINDINGS:  Visual inspection and cleaning provided.   C-stop changed with new. Small open domes changed with new. Battery was tested and good. Good listening check.    Reviewed cleaning, troubleshooting, and preventative care with patient. Any cleaning tools used were provided as customer courtesy.    Services performed and warranty reviewed with patient.    PLAN:  Based on patient report, no audiology appointment for adjustments needed.Ms. Ellis will return to clinic in 12 months, sooner if needed. Patient had no further questions and reports satisfaction.         Chiqui Harrell  Audiology Assistant  [facility]  [phone]

## 2020-07-24 NOTE — PROGRESS NOTES
Audiology Evaluation Completed. Please refer SCANNED AUDIOGRAM and/or TYMPANOGRAM for BACKGROUND, RESULTS, RECOMMENDATIONS.      Haley ANAYA, [facility]-A  Audiologist #[id number]

## 2020-09-11 ENCOUNTER — MYC MEDICAL ADVICE (OUTPATIENT)
Dept: FAMILY MEDICINE | Facility: OTHER | Age: 80
End: 2020-09-11

## 2020-09-11 DIAGNOSIS — R19.7 DIARRHEA, UNSPECIFIED TYPE: Primary | ICD-10-CM

## 2020-09-11 DIAGNOSIS — R19.7 DIARRHEA, UNSPECIFIED TYPE: ICD-10-CM

## 2020-09-11 LAB
C DIFF TOX B STL QL: NEGATIVE
G LAMBLIA+CRYPTOSP AG STL QL IA: NORMAL
G LAMBLIA+CRYPTOSP AG STL QL IA: NORMAL
LACTOFERRIN STL QL IA: POSITIVE
SPECIMEN SOURCE: NORMAL
SPECIMEN SOURCE: NORMAL

## 2020-09-11 PROCEDURE — 87493 C DIFF AMPLIFIED PROBE: CPT | Mod: ZL | Performed by: FAMILY MEDICINE

## 2020-09-11 PROCEDURE — 87328 CRYPTOSPORIDIUM AG IA: CPT | Mod: ZL | Performed by: FAMILY MEDICINE

## 2020-09-11 PROCEDURE — 87329 GIARDIA AG IA: CPT | Mod: ZL | Performed by: FAMILY MEDICINE

## 2020-09-11 PROCEDURE — 83516 IMMUNOASSAY NONANTIBODY: CPT | Mod: ZL | Performed by: FAMILY MEDICINE

## 2020-09-11 PROCEDURE — 36415 COLL VENOUS BLD VENIPUNCTURE: CPT | Mod: ZL | Performed by: FAMILY MEDICINE

## 2020-09-11 PROCEDURE — 83630 LACTOFERRIN FECAL (QUAL): CPT | Mod: ZL | Performed by: FAMILY MEDICINE

## 2020-09-11 PROCEDURE — 87045 FECES CULTURE AEROBIC BACT: CPT | Mod: ZL | Performed by: FAMILY MEDICINE

## 2020-09-11 PROCEDURE — 87015 SPECIMEN INFECT AGNT CONCNTJ: CPT | Mod: ZL | Performed by: FAMILY MEDICINE

## 2020-09-11 PROCEDURE — 87046 STOOL CULTR AEROBIC BACT EA: CPT | Mod: ZL | Performed by: FAMILY MEDICINE

## 2020-09-11 PROCEDURE — 87899 AGENT NOS ASSAY W/OPTIC: CPT | Mod: ZL | Performed by: FAMILY MEDICINE

## 2020-09-14 LAB
BACTERIA SPEC CULT: NORMAL
E COLI SXT1+2 STL IA: NORMAL
SPECIMEN SOURCE: NORMAL

## 2020-09-15 LAB
TTG IGA SER-ACNC: 1 U/ML
TTG IGG SER-ACNC: <1 U/ML

## 2020-09-24 ENCOUNTER — TRANSFERRED RECORDS (OUTPATIENT)
Dept: HEALTH INFORMATION MANAGEMENT | Facility: CLINIC | Age: 80
End: 2020-09-24

## 2020-10-21 ENCOUNTER — TRANSFERRED RECORDS (OUTPATIENT)
Dept: HEALTH INFORMATION MANAGEMENT | Facility: CLINIC | Age: 80
End: 2020-10-21

## 2020-10-30 ENCOUNTER — MYC MEDICAL ADVICE (OUTPATIENT)
Dept: PEDIATRICS | Facility: OTHER | Age: 80
End: 2020-10-30

## 2020-10-30 PROBLEM — K52.832 LYMPHOCYTIC COLITIS: Status: ACTIVE | Noted: 2020-10-30

## 2020-11-02 ENCOUNTER — OFFICE VISIT (OUTPATIENT)
Dept: FAMILY MEDICINE | Facility: OTHER | Age: 80
End: 2020-11-02
Attending: FAMILY MEDICINE
Payer: MEDICARE

## 2020-11-02 VITALS
HEIGHT: 63 IN | SYSTOLIC BLOOD PRESSURE: 130 MMHG | OXYGEN SATURATION: 98 % | HEART RATE: 70 BPM | DIASTOLIC BLOOD PRESSURE: 58 MMHG | RESPIRATION RATE: 19 BRPM | WEIGHT: 160 LBS | BODY MASS INDEX: 28.35 KG/M2 | TEMPERATURE: 96.8 F

## 2020-11-02 DIAGNOSIS — I10 BENIGN ESSENTIAL HYPERTENSION: ICD-10-CM

## 2020-11-02 DIAGNOSIS — K52.832 LYMPHOCYTIC COLITIS: Primary | ICD-10-CM

## 2020-11-02 LAB
ANION GAP SERPL CALCULATED.3IONS-SCNC: 5 MMOL/L (ref 3–14)
BUN SERPL-MCNC: 15 MG/DL (ref 7–30)
CALCIUM SERPL-MCNC: 8.9 MG/DL (ref 8.5–10.1)
CHLORIDE SERPL-SCNC: 105 MMOL/L (ref 94–109)
CO2 SERPL-SCNC: 28 MMOL/L (ref 20–32)
CREAT SERPL-MCNC: 0.67 MG/DL (ref 0.52–1.04)
GFR SERPL CREATININE-BSD FRML MDRD: 82 ML/MIN/{1.73_M2}
GLUCOSE SERPL-MCNC: 117 MG/DL (ref 70–99)
POTASSIUM SERPL-SCNC: 3.5 MMOL/L (ref 3.4–5.3)
SODIUM SERPL-SCNC: 138 MMOL/L (ref 133–144)

## 2020-11-02 PROCEDURE — 80048 BASIC METABOLIC PNL TOTAL CA: CPT | Mod: ZL | Performed by: FAMILY MEDICINE

## 2020-11-02 PROCEDURE — 99213 OFFICE O/P EST LOW 20 MIN: CPT | Performed by: FAMILY MEDICINE

## 2020-11-02 PROCEDURE — G0463 HOSPITAL OUTPT CLINIC VISIT: HCPCS | Mod: 25

## 2020-11-02 PROCEDURE — G0463 HOSPITAL OUTPT CLINIC VISIT: HCPCS

## 2020-11-02 PROCEDURE — 36415 COLL VENOUS BLD VENIPUNCTURE: CPT | Mod: ZL | Performed by: FAMILY MEDICINE

## 2020-11-02 RX ORDER — INFLUENZA A VIRUS A/MICHIGAN/45/2015 X-275 (H1N1) ANTIGEN (FORMALDEHYDE INACTIVATED), INFLUENZA A VIRUS A/SINGAPORE/INFIMH-16-0019/2016 IVR-186 (H3N2) ANTIGEN (FORMALDEHYDE INACTIVATED), INFLUENZA B VIRUS B/PHUKET/3073/2013 ANTIGEN (FORMALDEHYDE INACTIVATED), AND INFLUENZA B VIRUS B/MARYLAND/15/2016 BX-69A ANTIGEN (FORMALDEHYDE INACTIVATED) 60; 60; 60; 60 UG/.7ML; UG/.7ML; UG/.7ML; UG/.7ML
INJECTION, SUSPENSION INTRAMUSCULAR
COMMUNITY
Start: 2020-10-30 | End: 2022-11-18

## 2020-11-02 ASSESSMENT — MIFFLIN-ST. JEOR: SCORE: 1154.89

## 2020-11-02 ASSESSMENT — PAIN SCALES - GENERAL: PAINLEVEL: NO PAIN (0)

## 2020-11-02 NOTE — PROGRESS NOTES
"Subjective     Iris Ellis is a 80 year old female who presents to clinic today for the following health issues:    HPI         Concern - follow up from Dr Adams 9/24/20  Onset: a month  Description: here for follow up from Dr Adams, no longer having diarrhea. Still has cramping  Intensity: mild  Progression of Symptoms:  improving  Accompanying Signs & Symptoms: intestinal cramping   Previous history of similar problem: no  Precipitating factors:        Worsened by: nothing  Alleviating factors:        Improved by: nothing  Therapies tried and outcome: Imodium and Pepto Bismol     She had significant diarrhea and went on to have an EGD and colonoscopy which showed lymphocytic colitis. Her diarrhea has now resolved though she does get gassy at times and crampy with this. She will be seeing Dr Adams in December. She was not placed on any medication. She has tended now more toward constipation and is wondering about using a stool softener.         Review of Systems   Constitutional, HEENT, cardiovascular, pulmonary, gi and gu systems are negative, except as otherwise noted.      Objective    /58   Pulse 70   Temp 96.8  F (36  C) (Tympanic)   Resp 19   Ht 1.6 m (5' 3\")   Wt 72.6 kg (160 lb)   SpO2 98%   BMI 28.34 kg/m    Body mass index is 28.34 kg/m .  Physical Exam   GENERAL: healthy, alert and no distress  RESP: lungs clear to auscultation - no rales, rhonchi or wheezes  CV: regular rate and rhythm, normal S1 S2, no S3 or S4, no murmur, click or rub, no peripheral edema and peripheral pulses strong  ABDOMEN: soft, nontender, no hepatosplenomegaly, no masses and bowel sounds normal  NEURO: Normal strength and tone, mentation intact and speech normal  PSYCH: mentation appears normal, affect normal/bright            Assessment & Plan     Lymphocytic colitis  Improving, not requiring medication at this time  Follow up with Dr Adams in December and here if needed   Ok to use stool softener " "    Benign essential hypertension  Due for labs  Good control on low dose lisinopril   - Basic metabolic panel     BMI:   Estimated body mass index is 28.34 kg/m  as calculated from the following:    Height as of this encounter: 1.6 m (5' 3\").    Weight as of this encounter: 72.6 kg (160 lb).                Return if symptoms worsen or fail to improve.    Anna Bolaños MD  [facility] [place]    "

## 2020-11-02 NOTE — NURSING NOTE
"Chief Complaint   Patient presents with     RECHECK       Initial /58   Pulse 70   Temp 96.8  F (36  C) (Tympanic)   Resp 19   Ht 1.6 m (5' 3\")   Wt 72.6 kg (160 lb)   SpO2 98%   BMI 28.34 kg/m   Estimated body mass index is 28.34 kg/m  as calculated from the following:    Height as of this encounter: 1.6 m (5' 3\").    Weight as of this encounter: 72.6 kg (160 lb).  Medication Reconciliation: complete  Alba White LPN    "

## 2020-12-03 ENCOUNTER — TRANSFERRED RECORDS (OUTPATIENT)
Dept: HEALTH INFORMATION MANAGEMENT | Facility: CLINIC | Age: 80
End: 2020-12-03

## 2021-01-15 ENCOUNTER — HEALTH MAINTENANCE LETTER (OUTPATIENT)
Age: 81
End: 2021-01-15

## 2021-02-03 ENCOUNTER — IMMUNIZATION (OUTPATIENT)
Dept: FAMILY MEDICINE | Facility: OTHER | Age: 81
End: 2021-02-03
Attending: FAMILY MEDICINE
Payer: MEDICARE

## 2021-02-03 PROCEDURE — 91300 PR COVID VAC PFIZER DIL RECON 30 MCG/0.3 ML IM: CPT | Performed by: COUNSELOR

## 2021-02-12 DIAGNOSIS — E78.5 HYPERLIPIDEMIA LDL GOAL <100: Primary | ICD-10-CM

## 2021-02-12 DIAGNOSIS — I10 BENIGN ESSENTIAL HYPERTENSION: ICD-10-CM

## 2021-02-12 RX ORDER — ROSUVASTATIN CALCIUM 20 MG/1
20 TABLET, COATED ORAL DAILY
Qty: 90 TABLET | Refills: 1 | Status: SHIPPED | OUTPATIENT
Start: 2021-02-12 | End: 2021-11-08

## 2021-02-12 RX ORDER — LISINOPRIL 5 MG/1
TABLET ORAL
Qty: 90 TABLET | Refills: 3 | Status: SHIPPED | OUTPATIENT
Start: 2021-02-12 | End: 2021-11-08

## 2021-02-12 NOTE — TELEPHONE ENCOUNTER
Future lab pended for lipid and patient scheduled for lab on 02/16. Medication discontinued, but no reason provided as to why and patient states she is still taking this. Patient has about a week left of medication. PCP out. Please advise, thank you.    rosuvastatin (CRESTOR) 20 MG tablet      Last Written Prescription Date:  11/25/19  Last Fill Quantity: 90,   # refills: 3  Last Office Visit: 11/02/20  Future Office visit:       Routing refill request to provider for review/approval because:  Drug not active on patient's medication list  Failed protocol due to  LDL on file in past 12 months        Recent Labs   Lab Test 08/26/19  0845   LDL 72

## 2021-02-16 DIAGNOSIS — R73.09 ELEVATED GLUCOSE: Primary | ICD-10-CM

## 2021-02-16 DIAGNOSIS — I10 BENIGN ESSENTIAL HYPERTENSION: ICD-10-CM

## 2021-02-16 DIAGNOSIS — E78.5 HYPERLIPIDEMIA LDL GOAL <100: ICD-10-CM

## 2021-02-16 LAB
ALBUMIN SERPL-MCNC: 3.9 G/DL (ref 3.4–5)
ALP SERPL-CCNC: 69 U/L (ref 40–150)
ALT SERPL W P-5'-P-CCNC: 27 U/L (ref 0–50)
ANION GAP SERPL CALCULATED.3IONS-SCNC: 4 MMOL/L (ref 3–14)
AST SERPL W P-5'-P-CCNC: 17 U/L (ref 0–45)
BILIRUB SERPL-MCNC: 0.4 MG/DL (ref 0.2–1.3)
BUN SERPL-MCNC: 18 MG/DL (ref 7–30)
CALCIUM SERPL-MCNC: 9.4 MG/DL (ref 8.5–10.1)
CHLORIDE SERPL-SCNC: 109 MMOL/L (ref 94–109)
CHOLEST SERPL-MCNC: 208 MG/DL
CO2 SERPL-SCNC: 26 MMOL/L (ref 20–32)
CREAT SERPL-MCNC: 0.74 MG/DL (ref 0.52–1.04)
ERYTHROCYTE [DISTWIDTH] IN BLOOD BY AUTOMATED COUNT: 13.9 % (ref 10–15)
EST. AVERAGE GLUCOSE BLD GHB EST-MCNC: 117 MG/DL
GFR SERPL CREATININE-BSD FRML MDRD: 76 ML/MIN/{1.73_M2}
GLUCOSE SERPL-MCNC: 105 MG/DL (ref 70–99)
HBA1C MFR BLD: 5.7 % (ref 0–5.6)
HCT VFR BLD AUTO: 44.9 % (ref 35–47)
HDLC SERPL-MCNC: 70 MG/DL
HGB BLD-MCNC: 14.5 G/DL (ref 11.7–15.7)
LDLC SERPL CALC-MCNC: 107 MG/DL
MCH RBC QN AUTO: 28.7 PG (ref 26.5–33)
MCHC RBC AUTO-ENTMCNC: 32.3 G/DL (ref 31.5–36.5)
MCV RBC AUTO: 89 FL (ref 78–100)
NONHDLC SERPL-MCNC: 138 MG/DL
PLATELET # BLD AUTO: 391 10E9/L (ref 150–450)
POTASSIUM SERPL-SCNC: 4.3 MMOL/L (ref 3.4–5.3)
PROT SERPL-MCNC: 7.6 G/DL (ref 6.8–8.8)
RBC # BLD AUTO: 5.06 10E12/L (ref 3.8–5.2)
SODIUM SERPL-SCNC: 139 MMOL/L (ref 133–144)
TRIGL SERPL-MCNC: 157 MG/DL
WBC # BLD AUTO: 5.6 10E9/L (ref 4–11)

## 2021-02-16 PROCEDURE — 36415 COLL VENOUS BLD VENIPUNCTURE: CPT | Mod: ZL | Performed by: FAMILY MEDICINE

## 2021-02-16 PROCEDURE — 85027 COMPLETE CBC AUTOMATED: CPT | Mod: ZL | Performed by: FAMILY MEDICINE

## 2021-02-16 PROCEDURE — 999N001182 HC STATISTIC ESTIMATED AVERAGE GLUCOSE: Mod: ZL | Performed by: FAMILY MEDICINE

## 2021-02-16 PROCEDURE — 80053 COMPREHEN METABOLIC PANEL: CPT | Mod: ZL | Performed by: FAMILY MEDICINE

## 2021-02-16 PROCEDURE — 83036 HEMOGLOBIN GLYCOSYLATED A1C: CPT | Mod: ZL | Performed by: FAMILY MEDICINE

## 2021-02-16 PROCEDURE — 80061 LIPID PANEL: CPT | Mod: ZL | Performed by: FAMILY MEDICINE

## 2021-02-24 ENCOUNTER — IMMUNIZATION (OUTPATIENT)
Dept: FAMILY MEDICINE | Facility: OTHER | Age: 81
End: 2021-02-24
Attending: FAMILY MEDICINE
Payer: MEDICARE

## 2021-02-24 PROCEDURE — 91300 PR COVID VAC PFIZER DIL RECON 30 MCG/0.3 ML IM: CPT | Performed by: COUNSELOR

## 2021-03-01 DIAGNOSIS — M81.0 AGE-RELATED OSTEOPOROSIS WITHOUT CURRENT PATHOLOGICAL FRACTURE: ICD-10-CM

## 2021-03-01 RX ORDER — ALENDRONATE SODIUM 70 MG/1
70 TABLET ORAL
Qty: 12 TABLET | Refills: 3 | Status: SHIPPED | OUTPATIENT
Start: 2021-03-01 | End: 2021-12-05

## 2021-07-22 DIAGNOSIS — H91.93 DECREASED HEARING OF BOTH EARS: Primary | ICD-10-CM

## 2021-07-28 ENCOUNTER — OFFICE VISIT (OUTPATIENT)
Dept: AUDIOLOGY | Facility: OTHER | Age: 81
End: 2021-07-28
Attending: AUDIOLOGIST
Payer: MEDICARE

## 2021-07-28 ENCOUNTER — ALLIED HEALTH/NURSE VISIT (OUTPATIENT)
Dept: AUDIOLOGY | Facility: OTHER | Age: 81
End: 2021-07-28
Attending: AUDIOLOGIST
Payer: MEDICARE

## 2021-07-28 DIAGNOSIS — H91.93 DECREASED HEARING OF BOTH EARS: ICD-10-CM

## 2021-07-28 DIAGNOSIS — H90.3 SENSORINEURAL HEARING LOSS (SNHL) OF BOTH EARS: Primary | ICD-10-CM

## 2021-07-28 PROCEDURE — V5299 HEARING SERVICE: HCPCS

## 2021-07-28 PROCEDURE — 92556 SPEECH AUDIOMETRY COMPLETE: CPT | Performed by: AUDIOLOGIST

## 2021-07-28 PROCEDURE — 92552 PURE TONE AUDIOMETRY AIR: CPT | Performed by: AUDIOLOGIST

## 2021-07-28 NOTE — PROGRESS NOTES
Audiology Evaluation Completed. Please refer SCANNED AUDIOGRAM and/or TYMPANOGRAM for BACKGROUND, RESULTS, RECOMMENDATIONS.      Haley ANAYA, [facility]-A  Audiologist #[id number]

## 2021-07-28 NOTE — PROGRESS NOTES
HEARING AID CHECK    BACKGROUND:  Iris Ellis, a 81 year old female, was seen today for an hearing aid check.  Ms. Ellis wears Binaural Unitron Moxi2 16 hearing aids.  Ms. Ellis reported no concerns.    FINDINGS:  Visual inspection and cleaning provided.   C-stop changed with new. Small open domes changed with new. Battery was tested and weak. Good listening check.    Reviewed cleaning, troubleshooting, and preventative care with patient. Any cleaning tools used were provided as customer courtesy.    Services performed and warranty reviewed with patient.    PLAN:  Patient to be seen next by audiologist. Ms. Ellis will return to clinic in 12 months, sooner if needed. Patient had no further questions and reports satisfaction.         Chiqui Harrell  Audiology Assistant  [facility]  [phone]

## 2021-09-12 ENCOUNTER — HEALTH MAINTENANCE LETTER (OUTPATIENT)
Age: 81
End: 2021-09-12

## 2021-10-25 ENCOUNTER — ALLIED HEALTH/NURSE VISIT (OUTPATIENT)
Dept: FAMILY MEDICINE | Facility: OTHER | Age: 81
End: 2021-10-25
Attending: FAMILY MEDICINE
Payer: MEDICARE

## 2021-10-25 DIAGNOSIS — Z23 NEED FOR PROPHYLACTIC VACCINATION AND INOCULATION AGAINST INFLUENZA: Primary | ICD-10-CM

## 2021-10-25 PROCEDURE — G0008 ADMIN INFLUENZA VIRUS VAC: HCPCS

## 2021-10-25 PROCEDURE — 90662 IIV NO PRSV INCREASED AG IM: CPT

## 2021-11-05 ENCOUNTER — MYC MEDICAL ADVICE (OUTPATIENT)
Dept: FAMILY MEDICINE | Facility: OTHER | Age: 81
End: 2021-11-05
Payer: MEDICARE

## 2021-11-05 DIAGNOSIS — I10 BENIGN ESSENTIAL HYPERTENSION: ICD-10-CM

## 2021-11-05 DIAGNOSIS — E78.5 HYPERLIPIDEMIA LDL GOAL <100: ICD-10-CM

## 2021-11-08 ENCOUNTER — IMMUNIZATION (OUTPATIENT)
Dept: FAMILY MEDICINE | Facility: OTHER | Age: 81
End: 2021-11-08
Attending: FAMILY MEDICINE
Payer: MEDICARE

## 2021-11-08 PROCEDURE — 91300 PR COVID VAC PFIZER DIL RECON 30 MCG/0.3 ML IM: CPT

## 2021-11-08 RX ORDER — ROSUVASTATIN CALCIUM 20 MG/1
20 TABLET, COATED ORAL DAILY
Qty: 90 TABLET | Refills: 1 | Status: SHIPPED | OUTPATIENT
Start: 2021-11-08 | End: 2021-12-05

## 2021-11-08 RX ORDER — LISINOPRIL 5 MG/1
TABLET ORAL
Qty: 90 TABLET | Refills: 3 | Status: SHIPPED | OUTPATIENT
Start: 2021-11-08 | End: 2021-12-05

## 2021-11-23 NOTE — PROGRESS NOTES
SUBJECTIVE:   Iris Ellis is a 78 year old female who presents to clinic today for the following health issues:      Back Pain       Duration: 11 days        Specific cause: fall     Description:   Location of pain: middle of back right  Character of pain: gnawing  Pain radiation:to lower back area  New numbness or weakness in legs, not attributed to pain:  no     Intensity: Currently 5/10    History:   Pain interferes with job: Not applicable  History of back problems: no prior back problems  Any previous MRI or X-rays: None  Sees a specialist for back pain:  No  Therapies tried without relief: ibuprofen and acetaminophen (Tylenol)    Alleviating factors:   Improved by: none      Precipitating factors:  Worsened by: Bending and Walking          Accompanying Signs & Symptoms:  Risk of Fracture:  Fall  Risk of Cauda Equina:  None  Risk of Infection:  None  Risk of Cancer:  None  Risk of Ankylosing Spondylitis:  Onset at age <35, male, AND morning back stiffness. no         She fell out of the truck getting out hitting her right upper back on the running board. She didn't have pain the first 2 days but now has a gnawing pain, constant that heat and Advil doesn't relieve             Problem list and histories reviewed & adjusted, as indicated.  Additional history: as documented    Patient Active Problem List   Diagnosis     Osteopenia     Knee pain     Elevated LFTs     Sleep related leg cramps     Preventative health care     Elevated glucose     Mammogram declined     Advanced care planning/counseling discussion     Benign essential hypertension     Hyperlipidemia LDL goal <100     Anxiety     Past Surgical History:   Procedure Laterality Date     APPENDECTOMY  1944     Benign positional vertigo       CHOLECYSTECTOMY  1979     colonoscopies  1998, 2003, 2007, 2009, 9/18/2014    Colonic polyps. Repeat colonoscopy 2013     Declines further mammograms  2009     Left superior and inferior pubic rami fractures   2006     ORTHOPEDIC SURGERY  2011    total right hip. Dr. Nelson     PACs, PVCs, SVT, bradycardia on Holter Moniter  2009    Dr. Pace     Urethral dilation  2003    Urethral stricture       Social History   Substance Use Topics     Smoking status: Former Smoker     Types: Cigarettes     Smokeless tobacco: Never Used     Alcohol use Yes      Comment: rarely     Family History   Problem Relation Age of Onset     Alzheimer Disease Father      Cancer Sister      Colon     Cancer Brother      Lung, prostate, colon     Respiratory Brother      Diabetes Mother      Cancer Other      Colon-family h/o         Current Outpatient Prescriptions   Medication Sig Dispense Refill     ASPIRIN PO Take 81 mg by mouth daily       Calcium Carbonate-Vit D-Min (CALCIUM 1200 PO) Take  by mouth. Take 1 daily       Lactobacillus (ACIDOPHILUS PO) Take  by mouth. 1 daily       lisinopril (PRINIVIL/ZESTRIL) 5 MG tablet Take 1 tablet (5 mg) by oral route every day 90 tablet 3     multivitamin (OCUVITE) TABS tablet Take 1 tablet by mouth daily 90 each 3     nabumetone (RELAFEN) 500 MG tablet Take 1-2 tablets (500-1,000 mg) by mouth 2 times daily as needed for moderate pain 60 tablet 1     rosuvastatin (CRESTOR) 20 MG tablet Take 1 tablet (20 mg) by mouth daily 90 tablet 3     traMADol (ULTRAM) 50 MG tablet Take 1 tablet (50 mg) by mouth every 6 hours as needed for severe pain 10 tablet 0     UNABLE TO FIND 1 tablet daily MEDICATION NAME: Tumeric       Allergies   Allergen Reactions     Acetaminophen Nausea     Lortab     Hydrocodone Bitartrate Nausea     Lortab     Naproxen Nausea     Naprosyn     Rofecoxib      Upsets stomach  Vioxx     BP Readings from Last 3 Encounters:   11/19/18 140/64   07/20/18 122/60   06/21/18 122/60    Wt Readings from Last 3 Encounters:   07/20/18 168 lb (76.2 kg)   06/21/18 165 lb (74.8 kg)   12/21/17 165 lb (74.8 kg)                    Reviewed and updated as needed this visit by clinical staff       Reviewed  and updated as needed this visit by Provider         ROS:  Constitutional, HEENT, cardiovascular, pulmonary, gi and gu systems are negative, except as otherwise noted.    OBJECTIVE:                                                    /64 (BP Location: Right arm, Patient Position: Sitting, Cuff Size: Adult Regular)  Pulse 74  SpO2 98%  There is no height or weight on file to calculate BMI.  GENERAL APPEARANCE: alert, no distress and fatigued  MS: extremities normal- no gross deformities noted and thoracic spine is nontender, tenderness over the right mid ribs, T8-  T10 area  SKIN: no suspicious lesions or rashes  NEURO: Normal strength and tone, mentation intact and speech normal  PSYCH: mentation appears normal and worried         ASSESSMENT/PLAN:                                                    1. Acute right-sided thoracic back pain    - XR RIBS RIGHT 2 VIEWS (Clinic Performed); Future    2. Contusion of rib on right side, initial encounter  X rays are negative, will have an over read by Radiology. Advised Relafen bid with food, and ultram as needed for increased pain  - nabumetone (RELAFEN) 500 MG tablet; Take 1-2 tablets (500-1,000 mg) by mouth 2 times daily as needed for moderate pain  Dispense: 60 tablet; Refill: 1  - traMADol (ULTRAM) 50 MG tablet; Take 1 tablet (50 mg) by mouth every 6 hours as needed for severe pain  Dispense: 10 tablet; Refill: 0      Follow up with Provider - if not improving or for concerns    Anna Bolaños MD  [facility] [place]   [employer]

## 2022-02-27 ENCOUNTER — HEALTH MAINTENANCE LETTER (OUTPATIENT)
Age: 82
End: 2022-02-27

## 2022-07-14 DIAGNOSIS — H91.93 DECREASED HEARING OF BOTH EARS: Primary | ICD-10-CM

## 2022-08-16 ENCOUNTER — OFFICE VISIT (OUTPATIENT)
Dept: AUDIOLOGY | Facility: OTHER | Age: 82
End: 2022-08-16
Attending: AUDIOLOGIST
Payer: MEDICARE

## 2022-08-16 DIAGNOSIS — H90.3 SENSORINEURAL HEARING LOSS (SNHL) OF BOTH EARS: Primary | ICD-10-CM

## 2022-08-16 DIAGNOSIS — H91.93 DECREASED HEARING OF BOTH EARS: ICD-10-CM

## 2022-08-16 PROCEDURE — 92556 SPEECH AUDIOMETRY COMPLETE: CPT | Performed by: AUDIOLOGIST

## 2022-08-16 PROCEDURE — V5299 HEARING SERVICE: HCPCS | Performed by: AUDIOLOGIST

## 2022-08-16 PROCEDURE — 92552 PURE TONE AUDIOMETRY AIR: CPT | Performed by: AUDIOLOGIST

## 2022-08-16 NOTE — PROGRESS NOTES
Audiology Evaluation Completed. Please refer SCANNED AUDIOGRAM and/or TYMPANOGRAM for BACKGROUND, RESULTS, RECOMMENDATIONS.      Haley ANAYA, [facility]-A  Audiologist #[id number]

## 2022-08-16 NOTE — PROGRESS NOTES
Audiology Evaluation Completed. Please refer SCANNED AUDIOGRAM and/or TYMPANOGRAM for BACKGROUND, RESULTS, RECOMMENDATIONS.      Haley ANAYA, [facility]-A  Audiologist #[id number]

## 2022-09-06 NOTE — PROGRESS NOTES
Chief Complaint   Patient presents with     Cerumen Impaction     Ear cleaning       Patient returns to ENT for ear cleaning.  Patient was seen on 8/16/2022 for audiogram and is noted to have cerumen and patient was recommended to present to ENT for cleaning.  Patient was last seen in ENT by myself on 5/9/2019 for positional vertigo.  She has developed slight positional change on right, supine.       She was in for ear cleaning and noted cerumen impaction in her right ear.   NO history of loud noise expouse. No history of hearing loss.   No COM or otologic suergies.   She has no otalgia, otorrhea.   She has a history of BPPV and does have symptoms w/ turning head and movement.   Noise exposure- No.         Past Medical History:   Diagnosis Date     Blood glucose elevated      Cystic disease of ovaries      Family history of malignant neoplasm of gastrointestinal tract 04/25/2003    colon cancer in mother     Lymphocytic colitis 10/30/2020    Dr Adams, [facility]     Osteoarthritis 1/20011    Right hip LYNN, Dr Nelson     Osteopenia     Dexa 2004,2006,2009     Other abnormal blood chemistry 09/20/2012     psoriasis 04/21/2011     Pure hypercholesterolemia 11/22/1999     Symptomatic menopausal or female climacteric states 03/31/2003     Unspecified closed fracture of pelvis 09/20/2011     Unspecified essential hypertension 07/30/2006        Allergies   Allergen Reactions     Nabumetone Rash     Acetaminophen Nausea     Lortab     Hydrocodone Bitartrate Nausea     Lortab     Naproxen Nausea     Naprosyn     Rofecoxib      Upsets stomach  Vioxx     Current Outpatient Medications   Medication     alendronate (FOSAMAX) 70 MG tablet     Calcium Carbonate-Vit D-Min (CALCIUM 1200 PO)     esomeprazole (NEXIUM) 40 MG DR capsule     FLUZONE HIGH-DOSE QUADRIVALENT 0.7 ML WAI injection     Lactobacillus (ACIDOPHILUS PO)     lisinopril (ZESTRIL) 5 MG tablet     multivitamin (OCUVITE) TABS tablet     rosuvastatin (CRESTOR) 20 MG  "tablet     UNABLE TO FIND     No current facility-administered medications for this visit.     ROS- SEE HPI  /60 (BP Location: Left arm, Patient Position: Sitting, Cuff Size: Adult Regular)   Pulse 71   Temp 97.9  F (36.6  C) (Tympanic)   Ht 1.613 m (5' 3.5\")   Wt 76.2 kg (168 lb)   SpO2 97%   BMI 29.29 kg/m      General - The patient is well nourished and well developed, and appears to have good nutritional status.  Alert and oriented to person and place, answers questions and cooperates with examination appropriately.   Head and Face - Normocephalic and atraumatic, with no gross asymmetry noted.  The facial nerve is intact, with strong symmetric movements.  Voice and Breathing - The patient was breathing comfortably without the use of accessory muscles. There was no wheezing, stridor, or stertor.  The patients voice was clear and strong, and had appropriate pitch and quality.  On examination of the ears, I found that the ear(s) were completely impacted with dense cerumen.  Therefore, I positioned them in the examination chair in a semi-supine position, beginning with the right side.  I used the binocular surgical microscope to perform cerumen removal.  I began by using a cerumen loop to gently lift the edges of the cerumen mass away from the walls of the external canal.    Once the mass was loose enough, the entire plug was pulled from the canal.  The tympanic membrane was intact, no sign of perforation or middle ear effusion.    I turned my attention to the contralateral side once again using the binocular surgical microscope to perform cerumen removal.  I began by using a cerumen loop to gently lift the edges of the cerumen mass away from the walls of the external canal.   Once the mass was loose enough, the entire plug was pulled from the canal.  The tympanic membrane was intact, no sign of perforation or middle ear effusion.    Eyes - Extraocular movements intact, and the pupils were reactive to " light.  Sclera were not icteric or injected, conjunctiva were pink and moist.  Mouth - Examination of the oral cavity showed pink, healthy oral mucosa. No lesions or ulcerations noted.  The tongue was mobile and midline, and the dentition were in good condition.    Throat - The walls of the oropharynx were smooth, pink, moist, symmetric, and had no lesions or ulcerations.  The tonsillar pillars and soft palate were symmetric.  The uvula was midline on elevation.    Neck - Normal midline excursion of the laryngotracheal complex during swallowing.  Full range of motion on passive movement.  Palpation of the occipital, submental, submandibular, internal jugular chain, and supraclavicular nodes did not demonstrate any abnormal lymph nodes or masses.  Palpation of the thyroid was soft and smooth, with no nodules or goiter appreciated.  The trachea was mobile and midline.  Nose - External contour is symmetric, no gross deflection or scars.  Nasal mucosa is pink and moist with no abnormal mucus.  The septum and turbinates were evaluated:   No polyps, masses, or purulence noted on examination.  [place] Hallpike, Positive bilateral. Repositioned Right.        ASSESSMENT/ PLAN:    ICD-10-CM    1. Excessive cerumen in both ear canals  H61.23    2. Benign paroxysmal positional vertigo, right  H81.11    3. Vasomotor rhinitis  J30.0 ipratropium (ATROVENT) 0.06 % nasal spray       Ears were cleaned.   Normal ear exam.   She tolerated right repositioning in office without concerns. Symptom improved.   If there is no improvement consider repeat repositioning or Vestibular therapy.     May use a trial of Atrovent for PRN use.      For many people this type of dizziness is self-limited over a period of days or weeks.  Antivert may help but does not treat the problem itself.  Canalith repositioning can solve this problem often in one visit.  A referral to physical therapy along with a summary of the problem were reviewed with the patient.   If the dizziness persists a radiographic work-up or electronystagmogram can be pursued.  Education was provided to patient. Audiogram to be completed.  RTC if symptoms worsen or no continued improvement.       Silva Robledo PA-C  ENT  [facility]

## 2022-09-07 ENCOUNTER — OFFICE VISIT (OUTPATIENT)
Dept: OTOLARYNGOLOGY | Facility: OTHER | Age: 82
End: 2022-09-07
Attending: PHYSICIAN ASSISTANT
Payer: MEDICARE

## 2022-09-07 VITALS
HEIGHT: 64 IN | BODY MASS INDEX: 28.68 KG/M2 | WEIGHT: 168 LBS | TEMPERATURE: 97.9 F | DIASTOLIC BLOOD PRESSURE: 60 MMHG | HEART RATE: 71 BPM | SYSTOLIC BLOOD PRESSURE: 130 MMHG | OXYGEN SATURATION: 97 %

## 2022-09-07 DIAGNOSIS — H61.23 EXCESSIVE CERUMEN IN BOTH EAR CANALS: Primary | ICD-10-CM

## 2022-09-07 DIAGNOSIS — H81.11 BENIGN PAROXYSMAL POSITIONAL VERTIGO, RIGHT: ICD-10-CM

## 2022-09-07 DIAGNOSIS — J30.0 VASOMOTOR RHINITIS: ICD-10-CM

## 2022-09-07 PROCEDURE — 69210 REMOVE IMPACTED EAR WAX UNI: CPT | Performed by: PHYSICIAN ASSISTANT

## 2022-09-07 PROCEDURE — 69210 REMOVE IMPACTED EAR WAX UNI: CPT | Mod: 50 | Performed by: PHYSICIAN ASSISTANT

## 2022-09-07 PROCEDURE — 92504 EAR MICROSCOPY EXAMINATION: CPT | Performed by: PHYSICIAN ASSISTANT

## 2022-09-07 PROCEDURE — 99213 OFFICE O/P EST LOW 20 MIN: CPT | Mod: 25 | Performed by: PHYSICIAN ASSISTANT

## 2022-09-07 PROCEDURE — G0463 HOSPITAL OUTPT CLINIC VISIT: HCPCS

## 2022-09-07 RX ORDER — IPRATROPIUM BROMIDE 42 UG/1
2 SPRAY, METERED NASAL 4 TIMES DAILY PRN
Qty: 45 ML | Refills: 1 | Status: SHIPPED | OUTPATIENT
Start: 2022-09-07 | End: 2023-02-24

## 2022-09-07 ASSESSMENT — PAIN SCALES - GENERAL: PAINLEVEL: NO PAIN (0)

## 2022-09-07 NOTE — NURSING NOTE
"Chief Complaint   Patient presents with     Cerumen Impaction     Ear cleaning       Initial /60 (BP Location: Left arm, Patient Position: Sitting, Cuff Size: Adult Regular)   Pulse 71   Temp 97.9  F (36.6  C) (Tympanic)   Ht 1.613 m (5' 3.5\")   Wt 76.2 kg (168 lb)   SpO2 97%   BMI 29.29 kg/m   Estimated body mass index is 29.29 kg/m  as calculated from the following:    Height as of this encounter: 1.613 m (5' 3.5\").    Weight as of this encounter: 76.2 kg (168 lb).  Medication Reconciliation: complete  Emeli Isbell LPN    "

## 2022-09-07 NOTE — LETTER
9/7/2022         RE: Iris Ellis  [address] [facility] [address]        Dear Colleague,    Thank you for referring your patient, Iris Ellis, to the [facility]. Please see a copy of my visit note below.    Chief Complaint   Patient presents with     Cerumen Impaction     Ear cleaning       Patient returns to ENT for ear cleaning.  Patient was seen on 8/16/2022 for audiogram and is noted to have cerumen and patient was recommended to present to ENT for cleaning.  Patient was last seen in ENT by myself on 5/9/2019 for positional vertigo.  She has developed slight positional change on right, supine.       She was in for ear cleaning and noted cerumen impaction in her right ear.   NO history of loud noise expouse. No history of hearing loss.   No COM or otologic suergies.   She has no otalgia, otorrhea.   She has a history of BPPV and does have symptoms w/ turning head and movement.   Noise exposure- No.         Past Medical History:   Diagnosis Date     Blood glucose elevated      Cystic disease of ovaries      Family history of malignant neoplasm of gastrointestinal tract 04/25/2003    colon cancer in mother     Lymphocytic colitis 10/30/2020    Dr Adams, [facility]     Osteoarthritis 1/20011    Right hip LYNN, Dr Nelson     Osteopenia     Dexa 2004,2006,2009     Other abnormal blood chemistry 09/20/2012     psoriasis 04/21/2011     Pure hypercholesterolemia 11/22/1999     Symptomatic menopausal or female climacteric states 03/31/2003     Unspecified closed fracture of pelvis 09/20/2011     Unspecified essential hypertension 07/30/2006        Allergies   Allergen Reactions     Nabumetone Rash     Acetaminophen Nausea     Lortab     Hydrocodone Bitartrate Nausea     Lortab     Naproxen Nausea     Naprosyn     Rofecoxib      Upsets stomach  Vioxx     Current Outpatient Medications   Medication     alendronate (FOSAMAX) 70 MG tablet     Calcium Carbonate-Vit D-Min (CALCIUM  "1200 PO)     esomeprazole (NEXIUM) 40 MG DR capsule     FLUZONE HIGH-DOSE QUADRIVALENT 0.7 ML WAI injection     Lactobacillus (ACIDOPHILUS PO)     lisinopril (ZESTRIL) 5 MG tablet     multivitamin (OCUVITE) TABS tablet     rosuvastatin (CRESTOR) 20 MG tablet     UNABLE TO FIND     No current facility-administered medications for this visit.     ROS- SEE HPI  /60 (BP Location: Left arm, Patient Position: Sitting, Cuff Size: Adult Regular)   Pulse 71   Temp 97.9  F (36.6  C) (Tympanic)   Ht 1.613 m (5' 3.5\")   Wt 76.2 kg (168 lb)   SpO2 97%   BMI 29.29 kg/m      General - The patient is well nourished and well developed, and appears to have good nutritional status.  Alert and oriented to person and place, answers questions and cooperates with examination appropriately.   Head and Face - Normocephalic and atraumatic, with no gross asymmetry noted.  The facial nerve is intact, with strong symmetric movements.  Voice and Breathing - The patient was breathing comfortably without the use of accessory muscles. There was no wheezing, stridor, or stertor.  The patients voice was clear and strong, and had appropriate pitch and quality.  On examination of the ears, I found that the ear(s) were completely impacted with dense cerumen.  Therefore, I positioned them in the examination chair in a semi-supine position, beginning with the right side.  I used the binocular surgical microscope to perform cerumen removal.  I began by using a cerumen loop to gently lift the edges of the cerumen mass away from the walls of the external canal.    Once the mass was loose enough, the entire plug was pulled from the canal.  The tympanic membrane was intact, no sign of perforation or middle ear effusion.    I turned my attention to the contralateral side once again using the binocular surgical microscope to perform cerumen removal.  I began by using a cerumen loop to gently lift the edges of the cerumen mass away from the walls of " the external canal.   Once the mass was loose enough, the entire plug was pulled from the canal.  The tympanic membrane was intact, no sign of perforation or middle ear effusion.    Eyes - Extraocular movements intact, and the pupils were reactive to light.  Sclera were not icteric or injected, conjunctiva were pink and moist.  Mouth - Examination of the oral cavity showed pink, healthy oral mucosa. No lesions or ulcerations noted.  The tongue was mobile and midline, and the dentition were in good condition.    Throat - The walls of the oropharynx were smooth, pink, moist, symmetric, and had no lesions or ulcerations.  The tonsillar pillars and soft palate were symmetric.  The uvula was midline on elevation.    Neck - Normal midline excursion of the laryngotracheal complex during swallowing.  Full range of motion on passive movement.  Palpation of the occipital, submental, submandibular, internal jugular chain, and supraclavicular nodes did not demonstrate any abnormal lymph nodes or masses.  Palpation of the thyroid was soft and smooth, with no nodules or goiter appreciated.  The trachea was mobile and midline.  Nose - External contour is symmetric, no gross deflection or scars.  Nasal mucosa is pink and moist with no abnormal mucus.  The septum and turbinates were evaluated:   No polyps, masses, or purulence noted on examination.  [place] Hallpike, Positive bilateral. Repositioned Right.        ASSESSMENT/ PLAN:    ICD-10-CM    1. Excessive cerumen in both ear canals  H61.23    2. Benign paroxysmal positional vertigo, right  H81.11    3. Vasomotor rhinitis  J30.0 ipratropium (ATROVENT) 0.06 % nasal spray       Ears were cleaned.   Normal ear exam.   She tolerated right repositioning in office without concerns. Symptom improved.   If there is no improvement consider repeat repositioning or Vestibular therapy.     May use a trial of Atrovent for PRN use.      For many people this type of dizziness is self-limited over a  period of days or weeks.  Antivert may help but does not treat the problem itself.  Canalith repositioning can solve this problem often in one visit.  A referral to physical therapy along with a summary of the problem were reviewed with the patient.  If the dizziness persists a radiographic work-up or electronystagmogram can be pursued.  Education was provided to patient. Audiogram to be completed.  RTC if symptoms worsen or no continued improvement.       Silva Robledo PA-C  ENT  [facility] [place]          Again, thank you for allowing me to participate in the care of your patient.        Sincerely,        Silva Robledo PA-C

## 2022-09-07 NOTE — PATIENT INSTRUCTIONS
Ears were cleaned  Follow up as needed for ear cleaning.   Hearing stable.     Vertigo- Right BPPV on exam. Repositioned today  If ongoing concerns- contact nurse.       Thank you for allowing Silva Robledo PA-C and our ENT team to participate in your care.  If your medications are too expensive, please give the nurse a call.  We can possibly change this medication.  If you have a scheduling or an appointment question please contact our Health Unit Coordinator at [phone], Ext. [phone].    ALL nursing questions or concerns can be directed to your ENT nurse at: [phone] [facility]      Based on today's exam and history, I think that the most likely diagnosis at this point is benign paroxysmal positional vertigo.  The etiology of benign paroxysmal positional vertigo being small crystals tumbling in the semicircular canals was discussed at length.  Also, the treatment of the problem with physical therapy for canalith repositioning maneuvers was discussed.    I will send the patient to physical therapy for this to be done.        Use a trial of Atrovent. Use 2 sprays to each nostril 1-4 times daily as needed    Vasomotor rhinitis is a disease of unknown causes more common with age.  It can follow stress (head trauma, sinusitis, pregnancy).  It is associated with loss of control of the nose's function.   In vasomotor rhinitis the nose swells and secretes mucus and fluid to minor irritations.  Clear, runny nasal congestion and postnasal drainage are the usual complaints.  Irritating triggers can include:  smoke, dust, sprays, smells, perfume, alcohol, chemicals, newsprint, perfumes, wind or a fan blowing in the face, rapid chilling or heating of the body.  This can present with profuse, watery runny nose or congestion in association with eating hot or spicy foods.      Exercise induced rhinitis (EIR) is another non-allergic form of rhinitis.  Symptoms may include runny nose, post nasal drainage, congestion, sneezing, watery  or itchy eyes and nose.  EIR is more pronounced and common among allergic individuals, so an allergy work up may be indicated.   EIR may be due to increased neural activity to the brain associated with increased blood flow to the nasal mucosa or decreased sympathetic tone to the nasal blood vessels.  Other cuases of these symptoms need to be excluded (sinusitis, allergies, anatomic obstruction).  Treatment may include antihistamines, nasal saline, nasal steroids, nasal iprotroprium.  Make certain that these medications do not violate the anti-doping regualtion of your athletic organization prior to taking the medication.    References:  Annals of Allergy, Asthma & Immunology, Oleg and Hodge, 2/2006  [facility]  [email or web address]

## 2022-10-03 ENCOUNTER — IMMUNIZATION (OUTPATIENT)
Dept: FAMILY MEDICINE | Facility: OTHER | Age: 82
End: 2022-10-03
Attending: FAMILY MEDICINE
Payer: MEDICARE

## 2022-10-03 PROCEDURE — 91312 COVID-19,PF,PFIZER BOOSTER BIVALENT: CPT

## 2022-10-03 PROCEDURE — G0008 ADMIN INFLUENZA VIRUS VAC: HCPCS

## 2022-10-14 ENCOUNTER — MYC MEDICAL ADVICE (OUTPATIENT)
Dept: FAMILY MEDICINE | Facility: OTHER | Age: 82
End: 2022-10-14

## 2022-10-14 NOTE — TELEPHONE ENCOUNTER
Completed via [employer]. No concerns noted at present with NEGATIVE screening total score <3.       PHQ-2 Score:     PHQ-2 ( 1999 [employer]) 10/14/2022 11/2/2020   Q1: Little interest or pleasure in doing things 1 0   Q2: Feeling down, depressed or hopeless 1 0   PHQ-2 Score 2 0   PHQ-2 Total Score (12-17 Years)- Positive if 3 or more points; Administer PHQ-A if positive - 0   Q1: Little interest or pleasure in doing things Several days -   Q2: Feeling down, depressed or hopeless Several days -   PHQ-2 Score 2 -     Jayla Dietz RN

## 2022-10-16 ENCOUNTER — MYC MEDICAL ADVICE (OUTPATIENT)
Dept: FAMILY MEDICINE | Facility: OTHER | Age: 82
End: 2022-10-16

## 2022-10-20 NOTE — TELEPHONE ENCOUNTER
Call placed to patient to discuss MyChart request for lisinopril to be sent to [facility] until mid November when patient is able to fill through [facility].    Patient notified this writer confirmed with [facility] the pharmacy is able to fill #30 lisinopril from recent refill received on 10/18/22 for lisinopril #90 with 3 refills.     Patient will follow up with [facility] to coordinate when lisinopril #30 will be available for  and thanked this writer.     Patient has no further questions / concerns at this time.

## 2022-11-18 ENCOUNTER — OFFICE VISIT (OUTPATIENT)
Dept: FAMILY MEDICINE | Facility: OTHER | Age: 82
End: 2022-11-18
Attending: FAMILY MEDICINE
Payer: MEDICARE

## 2022-11-18 VITALS
WEIGHT: 170 LBS | HEART RATE: 95 BPM | TEMPERATURE: 97.6 F | DIASTOLIC BLOOD PRESSURE: 58 MMHG | OXYGEN SATURATION: 98 % | HEIGHT: 63 IN | SYSTOLIC BLOOD PRESSURE: 132 MMHG | BODY MASS INDEX: 30.12 KG/M2

## 2022-11-18 DIAGNOSIS — E66.9 OBESITY WITHOUT SERIOUS COMORBIDITY, UNSPECIFIED CLASSIFICATION, UNSPECIFIED OBESITY TYPE: ICD-10-CM

## 2022-11-18 DIAGNOSIS — Z63.6 CAREGIVER STRESS: ICD-10-CM

## 2022-11-18 DIAGNOSIS — E78.5 HYPERLIPIDEMIA LDL GOAL <130: ICD-10-CM

## 2022-11-18 DIAGNOSIS — M25.50 MULTIPLE JOINT PAIN: ICD-10-CM

## 2022-11-18 DIAGNOSIS — R73.09 ELEVATED GLUCOSE: ICD-10-CM

## 2022-11-18 DIAGNOSIS — H81.13 BENIGN PAROXYSMAL POSITIONAL VERTIGO DUE TO BILATERAL VESTIBULAR DISORDER: ICD-10-CM

## 2022-11-18 DIAGNOSIS — Z00.00 ENCOUNTER FOR MEDICAL EXAMINATION TO ESTABLISH CARE: Primary | ICD-10-CM

## 2022-11-18 PROCEDURE — G0463 HOSPITAL OUTPT CLINIC VISIT: HCPCS | Performed by: FAMILY MEDICINE

## 2022-11-18 PROCEDURE — 99214 OFFICE O/P EST MOD 30 MIN: CPT | Performed by: FAMILY MEDICINE

## 2022-11-18 RX ORDER — MECLIZINE HYDROCHLORIDE 25 MG/1
25 TABLET ORAL 3 TIMES DAILY PRN
Qty: 20 TABLET | Refills: 0 | Status: SHIPPED | OUTPATIENT
Start: 2022-11-18 | End: 2023-02-24

## 2022-11-18 RX ORDER — ORAL SEMAGLUTIDE 3 MG/1
3 TABLET ORAL DAILY
Qty: 30 TABLET | Refills: 0 | Status: SHIPPED | OUTPATIENT
Start: 2022-11-18 | End: 2023-02-24

## 2022-11-18 RX ORDER — ORAL SEMAGLUTIDE 7 MG/1
7 TABLET ORAL DAILY
Qty: 30 TABLET | Refills: 0 | Status: SHIPPED | OUTPATIENT
Start: 2022-11-18 | End: 2023-03-29 | Stop reason: DRUGHIGH

## 2022-11-18 SDOH — SOCIAL STABILITY - SOCIAL INSECURITY: DEPENDENT RELATIVE NEEDING CARE AT HOME: Z63.6

## 2022-11-18 ASSESSMENT — PAIN SCALES - GENERAL: PAINLEVEL: NO PAIN (0)

## 2022-11-18 NOTE — PROGRESS NOTES
"  Assessment & Plan     Encounter for medical examination to establish care  Records here    Obesity without serious comorbidity, unspecified classification, unspecified obesity type  Stressful caring for her , declines ssris for now, would like to try meds  Follow-up 2 mos  - Semaglutide (RYBELSUS) 3 MG TABS; Take 3 mg by mouth daily  - Semaglutide (RYBELSUS) 7 MG TABS; Take 7 mg by mouth daily    Benign paroxysmal positional vertigo due to bilateral vestibular disorder  Recurrent issue in the past as well  meds over weekend and then ENT next week  - meclizine (ANTIVERT) 25 MG tablet; Take 1 tablet (25 mg) by mouth 3 times daily as needed for dizziness  - Adult ENT  Referral; Future    Multiple joint pain  Takes over 1 hr to get motion in her hands everyday upon waking  - Anti Nuclear Sapphire IgG by IFA with Reflex; Future  - Rheumatoid factor; Future  - Cyclic Citrullinated Peptide Antibody IgG; Future    Elevated glucose  In the past, will check -- hasn't had labs for almost 2 yrs  Will come fasting next week  - Hemoglobin A1c; Future    Hyperlipidemia LDL goal <130  The ASCVD Risk score (Pavan DK, et al., 2019) failed to calculate for the following reasons:    The 2019 ASCVD risk score is only valid for ages 40 to 79  - Comprehensive metabolic panel; Future  - Lipid Profile (Chol, Trig, HDL, LDL calc); Future    Caregiver stress  Options discussed, she wants to think about it  Follow-up 2mos      Provider  Link to [facility] Help Grid :[id number]}     BMI:   Estimated body mass index is 30.11 kg/m  as calculated from the following:    Height as of this encounter: 1.6 m (5' 3\").    Weight as of this encounter: 77.1 kg (170 lb).     Patient was agreeable to this plan and had no further questions.  There are no Patient Instructions on file for this visit.    No follow-ups on file.    Baylee Rodríguez MD  [facility] - ANDREI Simmons is a 82 year old, presenting for the following " "health issues:  Establish Care      HPI     Hyperlipidemia Follow-Up      Are you regularly taking any medication or supplement to lower your cholesterol?   Yes- crestor    Are you having muscle aches or other side effects that you think could be caused by your cholesterol lowering medication?  No    Hypertension Follow-up      Do you check your blood pressure regularly outside of the clinic? Yes     Are you following a low salt diet? Yes    Are your blood pressures ever more than 140 on the top number (systolic) OR more   than 90 on the bottom number (diastolic), for example 140/90? No  Normally on 10mg of lisinopril       How many servings of fruits and vegetables do you eat daily?  2-3    On average, how many sweetened beverages do you drink each day (Examples: soda, juice, sweet tea, etc.  Do NOT count diet or artificially sweetened beverages)?   0    How many days per week do you exercise enough to make your heart beat faster? 6    How many minutes a day do you exercise enough to make your heart beat faster? 30 - 60    How many days per week do you miss taking your medication? 0      Depression/weight gain      Duration: 2 years    Description (location/character/radiation):  was dx with dementia, has to care for him. Going through a lot of life changes right now. Pt is stating she is turning to food for comfort when she is stressed    Intensity:  moderate    Accompanying signs and symptoms: 25lb weight gain    History (similar episodes/previous evaluation): None- no hx of depression    Precipitating or alleviating factors: family matters    Therapies tried and outcome: None    Review of Systems   Constitutional, HEENT, cardiovascular, pulmonary, gi and gu systems are negative, except as otherwise noted.      Objective    /58   Pulse 95   Temp 97.6  F (36.4  C)   Ht 1.6 m (5' 3\")   Wt 77.1 kg (170 lb)   SpO2 98%   BMI 30.11 kg/m    Body mass index is 30.11 kg/m .  Physical Exam   GENERAL: " healthy, alert and no distress  RESP: lungs clear to auscultation - no rales, rhonchi or wheezes  CV: regular rate and rhythm, normal S1 S2, no S3 or S4, no murmur, click or rub, no peripheral edema and peripheral pulses strong  MS: no gross musculoskeletal defects noted, no edema  PSYCH: mentation appears normal, affect normal/bright    No results found for any visits on 11/18/22.

## 2022-11-21 ENCOUNTER — MYC MEDICAL ADVICE (OUTPATIENT)
Dept: OTOLARYNGOLOGY | Facility: OTHER | Age: 82
End: 2022-11-21

## 2022-11-21 NOTE — PROGRESS NOTES
Chief Complaint   Patient presents with     Dizziness       Patient presents for vertigo.  Patient was seen on 11/18/2022 by her primary physician and she expressed concerns for vertigo.  Patient has been seen in years past with positional vertigo and was repositioned.  On 5/9/2019 she was treated for bilateral BPV and reposition on right.  She was referred to physical therapy as well.    Today, Mary has ongoing concerns with vertigo. She had noticed symptoms developed even while standing, walking felt off balance.   Symptoms began last week and were worse on Thursday, Friday and have since lessened. She states she woke up in bed with spinning and lasted for several hours. She has been taking meclizine with some improvement.   Mild nausea. No emesis    Iris feels like she is drunk, the dizziness feeling has slightly improved today  No concerns with positional change in bed as it was present at her past.     Denies migraines or headaches  No sound sensitivity.   No hearing change or fluctuating loss.   NO history of loud noise expouse.   No history of hearing loss.   No COM or otologic suergies.   She has no otalgia, otorrhea.   She has a history of BPPV and does have symptoms w/ turning head and movement.   No recent cold, URI or illness.     Reviewed audiogram from 8/.2022  Past Medical History:   Diagnosis Date     Blood glucose elevated      BPPV (benign paroxysmal positional vertigo)      Cystic disease of ovaries      Essential hypertension 07/2006     Family history of malignant neoplasm of gastrointestinal tract 04/25/2003    colon cancer in mother     Lymphocytic colitis 10/30/2020    Dr Adams, [facility]     Osteoarthritis 1/20011    Right hip [facility], Dr Nelson     Osteoporosis      Other abnormal blood chemistry 09/20/2012     psoriasis 04/21/2011     Pure hypercholesterolemia 11/22/1999     Symptomatic menopausal or female climacteric states 03/31/2003     Unspecified closed fracture of pelvis  "09/20/2011     Urethral stricture         Allergies   Allergen Reactions     Nabumetone Rash     Acetaminophen Nausea     Lortab     Hydrocodone Bitartrate Nausea     Lortab     Naproxen Nausea     Naprosyn     Rofecoxib      Upsets stomach  Vioxx     Current Outpatient Medications   Medication     alendronate (FOSAMAX) 70 MG tablet     Calcium Carbonate-Vit D-Min (CALCIUM 1200 PO)     esomeprazole (NEXIUM) 40 MG DR capsule     ipratropium (ATROVENT) 0.06 % nasal spray     Lactobacillus (ACIDOPHILUS PO)     lisinopril (ZESTRIL) 5 MG tablet     meclizine (ANTIVERT) 25 MG tablet     multivitamin (OCUVITE) TABS tablet     rosuvastatin (CRESTOR) 20 MG tablet     Semaglutide (RYBELSUS) 3 MG TABS     Semaglutide (RYBELSUS) 7 MG TABS     UNABLE TO FIND     No current facility-administered medications for this visit.     ROS- SEE HPI    /72 (Cuff Size: Adult Regular)   Pulse 75   Temp 97.4  F (36.3  C) (Tympanic)   Ht 1.6 m (5' 3\")   Wt 77.1 kg (170 lb)   SpO2 98%   BMI 30.11 kg/m      General - The patient is well nourished and well developed, and appears to have good nutritional status.  Alert and oriented to person and place, answers questions and cooperates with examination appropriately.   Head and Face - Normocephalic and atraumatic, with no gross asymmetry noted.  The facial nerve is intact, with strong symmetric movements.  Voice and Breathing - The patient was breathing comfortably without the use of accessory muscles. There was no wheezing, stridor, or stertor.  The patients voice was clear and strong, and had appropriate pitch and quality.  Ears - Ears examined with with otoscope and under otologic microscopy. The external auditory canals are patent, the tympanic membranes are intact without effusion, retraction or mass.  Bony landmarks are intact. Ears were examined under microscopy. No cerumen present.   Eyes - Extraocular movements intact, and the pupils were reactive to light.  Sclera were not " icteric or injected, conjunctiva were pink and moist.  Mouth - Examination of the oral cavity showed pink, healthy oral mucosa. No lesions or ulcerations noted.  The tongue was mobile and midline, and the dentition were in good condition.    Throat - The walls of the oropharynx were smooth, pink, moist, symmetric, and had no lesions or ulcerations.  The tonsillar pillars and soft palate were symmetric.  The uvula was midline on elevation.    Neck - Normal midline excursion of the laryngotracheal complex during swallowing.  Full range of motion on passive movement.  Palpation of the occipital, submental, submandibular, internal jugular chain, and supraclavicular nodes did not demonstrate any abnormal lymph nodes or masses.  Palpation of the thyroid was soft and smooth, with no nodules or goiter appreciated.  The trachea was mobile and midline.  Nose - External contour is symmetric, no gross deflection or scars.  Nasal mucosa is pink and moist with no abnormal mucus.  The septum and turbinates were evaluated:   No polyps, masses, or purulence noted on examination.  NEURO:  equal  strength, neg Romberg, DTR II/IV bilaterally (UE and LE), finger to nose normal, CN intact, ambulates without difficulty.  no focal deficits noted.  [place] Hallpike- Bilateral- Negative. No producible symptoms or nystagmus.     ASSESSMENT/ PLAN:    ICD-10-CM    1. Vertigo  R42 CBC with platelets     Comprehensive metabolic panel (BMP + Alb, Alk Phos, ALT, AST, Total. Bili, TP)     CBC with platelets     Comprehensive metabolic panel (BMP + Alb, Alk Phos, ALT, AST, Total. Bili, TP)      2. Dizziness  R42 CBC with platelets     Comprehensive metabolic panel (BMP + Alb, Alk Phos, ALT, AST, Total. Bili, TP)     CBC with platelets     Comprehensive metabolic panel (BMP + Alb, Alk Phos, ALT, AST, Total. Bili, TP)      3. Sensorineural hearing loss (SNHL) of both ears  H90.3           Iris reports improvement over the last few days. No  producible symptoms on examination today.   She was recommended to use Meclizine PRN  Return to ENT if symptoms worsen  Discussed completing MRI of IAC due to dizziness, but declined at this time. She will consider in future.   If return of symptoms referral to vestibular therapy.   Monitor symptoms.           Silva Robledo PA-C  ENT  [facility], [place]

## 2022-11-21 NOTE — TELEPHONE ENCOUNTER
Hilda please call.   We could see her tomorrow at 1245 for repositioning/ testing. We do not need updated audiogram unless there was a concern. She was just seen on 8/16 in audiology. JOSE

## 2022-11-22 ENCOUNTER — OFFICE VISIT (OUTPATIENT)
Dept: OTOLARYNGOLOGY | Facility: OTHER | Age: 82
End: 2022-11-22
Attending: FAMILY MEDICINE
Payer: MEDICARE

## 2022-11-22 VITALS
HEIGHT: 63 IN | OXYGEN SATURATION: 98 % | DIASTOLIC BLOOD PRESSURE: 72 MMHG | HEART RATE: 75 BPM | BODY MASS INDEX: 30.12 KG/M2 | TEMPERATURE: 97.4 F | WEIGHT: 170 LBS | SYSTOLIC BLOOD PRESSURE: 134 MMHG

## 2022-11-22 DIAGNOSIS — R42 DIZZINESS: ICD-10-CM

## 2022-11-22 DIAGNOSIS — R42 VERTIGO: Primary | ICD-10-CM

## 2022-11-22 DIAGNOSIS — H90.3 SENSORINEURAL HEARING LOSS (SNHL) OF BOTH EARS: ICD-10-CM

## 2022-11-22 LAB
ALBUMIN SERPL BCG-MCNC: 4.2 G/DL (ref 3.5–5.2)
ALP SERPL-CCNC: 66 U/L (ref 35–104)
ALT SERPL W P-5'-P-CCNC: 34 U/L (ref 10–35)
ANION GAP SERPL CALCULATED.3IONS-SCNC: 10 MMOL/L (ref 7–15)
AST SERPL W P-5'-P-CCNC: 25 U/L (ref 10–35)
BILIRUB SERPL-MCNC: 0.3 MG/DL
BUN SERPL-MCNC: 12.7 MG/DL (ref 8–23)
CALCIUM SERPL-MCNC: 9.6 MG/DL (ref 8.8–10.2)
CHLORIDE SERPL-SCNC: 101 MMOL/L (ref 98–107)
CREAT SERPL-MCNC: 0.75 MG/DL (ref 0.51–0.95)
DEPRECATED HCO3 PLAS-SCNC: 27 MMOL/L (ref 22–29)
ERYTHROCYTE [DISTWIDTH] IN BLOOD BY AUTOMATED COUNT: 14.1 % (ref 10–15)
GFR SERPL CREATININE-BSD FRML MDRD: 78 ML/MIN/1.73M2
GLUCOSE SERPL-MCNC: 96 MG/DL (ref 70–99)
HCT VFR BLD AUTO: 42.4 % (ref 35–47)
HGB BLD-MCNC: 13.6 G/DL (ref 11.7–15.7)
MCH RBC QN AUTO: 28.7 PG (ref 26.5–33)
MCHC RBC AUTO-ENTMCNC: 32.1 G/DL (ref 31.5–36.5)
MCV RBC AUTO: 90 FL (ref 78–100)
PLATELET # BLD AUTO: 365 10E3/UL (ref 150–450)
POTASSIUM SERPL-SCNC: 4.5 MMOL/L (ref 3.4–5.3)
PROT SERPL-MCNC: 7.1 G/DL (ref 6.4–8.3)
RBC # BLD AUTO: 4.74 10E6/UL (ref 3.8–5.2)
SODIUM SERPL-SCNC: 138 MMOL/L (ref 136–145)
WBC # BLD AUTO: 6.5 10E3/UL (ref 4–11)

## 2022-11-22 PROCEDURE — 36415 COLL VENOUS BLD VENIPUNCTURE: CPT | Mod: ZL | Performed by: PHYSICIAN ASSISTANT

## 2022-11-22 PROCEDURE — 99213 OFFICE O/P EST LOW 20 MIN: CPT | Mod: 25 | Performed by: PHYSICIAN ASSISTANT

## 2022-11-22 PROCEDURE — 92504 EAR MICROSCOPY EXAMINATION: CPT | Performed by: PHYSICIAN ASSISTANT

## 2022-11-22 PROCEDURE — G0463 HOSPITAL OUTPT CLINIC VISIT: HCPCS

## 2022-11-22 PROCEDURE — 82040 ASSAY OF SERUM ALBUMIN: CPT | Mod: ZL | Performed by: PHYSICIAN ASSISTANT

## 2022-11-22 PROCEDURE — 85027 COMPLETE CBC AUTOMATED: CPT | Mod: ZL | Performed by: PHYSICIAN ASSISTANT

## 2022-11-22 PROCEDURE — 80053 COMPREHEN METABOLIC PANEL: CPT | Mod: ZL | Performed by: PHYSICIAN ASSISTANT

## 2022-11-22 ASSESSMENT — PAIN SCALES - GENERAL: PAINLEVEL: NO PAIN (0)

## 2022-11-22 NOTE — LETTER
11/22/2022         RE: Iris Ellis  [address] [facility] [address]        Dear Colleague,    Thank you for referring your patient, Iris Ellis, to the [facility]. Please see a copy of my visit note below.        Chief Complaint   Patient presents with     Dizziness       Patient presents for vertigo.  Patient was seen on 11/18/2022 by her primary physician and she expressed concerns for vertigo.  Patient has been seen in years past with positional vertigo and was repositioned.  On 5/9/2019 she was treated for bilateral BPV and reposition on right.  She was referred to physical therapy as well.    Today, Mary has ongoing concerns with vertigo. She had noticed symptoms developed even while standing, walking felt off balance.   Symptoms began last week and were worse on Thursday, Friday and have since lessened. She states she woke up in bed with spinning and lasted for several hours. She has been taking meclizine with some improvement.   Mild nausea. No emesis    Iris feels like she is drunk, the dizziness feeling has slightly improved today  No concerns with positional change in bed as it was present at her past.     Denies migraines or headaches  No sound sensitivity.   No hearing change or fluctuating loss.   NO history of loud noise expouse.   No history of hearing loss.   No COM or otologic suergies.   She has no otalgia, otorrhea.   She has a history of BPPV and does have symptoms w/ turning head and movement.   No recent cold, URI or illness.     Reviewed audiogram from 8/.2022  Past Medical History:   Diagnosis Date     Blood glucose elevated      BPPV (benign paroxysmal positional vertigo)      Cystic disease of ovaries      Essential hypertension 07/2006     Family history of malignant neoplasm of gastrointestinal tract 04/25/2003    colon cancer in mother     Lymphocytic colitis 10/30/2020    Dr Adams, [facility]     Osteoarthritis 1/20011    Right hip  "Dr Leslie BAILEY     Osteoporosis      Other abnormal blood chemistry 09/20/2012     psoriasis 04/21/2011     Pure hypercholesterolemia 11/22/1999     Symptomatic menopausal or female climacteric states 03/31/2003     Unspecified closed fracture of pelvis 09/20/2011     Urethral stricture         Allergies   Allergen Reactions     Nabumetone Rash     Acetaminophen Nausea     Lortab     Hydrocodone Bitartrate Nausea     Lortab     Naproxen Nausea     Naprosyn     Rofecoxib      Upsets stomach  Vioxx     Current Outpatient Medications   Medication     alendronate (FOSAMAX) 70 MG tablet     Calcium Carbonate-Vit D-Min (CALCIUM 1200 PO)     esomeprazole (NEXIUM) 40 MG DR capsule     ipratropium (ATROVENT) 0.06 % nasal spray     Lactobacillus (ACIDOPHILUS PO)     lisinopril (ZESTRIL) 5 MG tablet     meclizine (ANTIVERT) 25 MG tablet     multivitamin (OCUVITE) TABS tablet     rosuvastatin (CRESTOR) 20 MG tablet     Semaglutide (RYBELSUS) 3 MG TABS     Semaglutide (RYBELSUS) 7 MG TABS     UNABLE TO FIND     No current facility-administered medications for this visit.     ROS- SEE HPI    /72 (Cuff Size: Adult Regular)   Pulse 75   Temp 97.4  F (36.3  C) (Tympanic)   Ht 1.6 m (5' 3\")   Wt 77.1 kg (170 lb)   SpO2 98%   BMI 30.11 kg/m      General - The patient is well nourished and well developed, and appears to have good nutritional status.  Alert and oriented to person and place, answers questions and cooperates with examination appropriately.   Head and Face - Normocephalic and atraumatic, with no gross asymmetry noted.  The facial nerve is intact, with strong symmetric movements.  Voice and Breathing - The patient was breathing comfortably without the use of accessory muscles. There was no wheezing, stridor, or stertor.  The patients voice was clear and strong, and had appropriate pitch and quality.  Ears - Ears examined with with otoscope and under otologic microscopy. The external auditory canals are patent, " the tympanic membranes are intact without effusion, retraction or mass.  Bony landmarks are intact. Ears were examined under microscopy. No cerumen present.   Eyes - Extraocular movements intact, and the pupils were reactive to light.  Sclera were not icteric or injected, conjunctiva were pink and moist.  Mouth - Examination of the oral cavity showed pink, healthy oral mucosa. No lesions or ulcerations noted.  The tongue was mobile and midline, and the dentition were in good condition.    Throat - The walls of the oropharynx were smooth, pink, moist, symmetric, and had no lesions or ulcerations.  The tonsillar pillars and soft palate were symmetric.  The uvula was midline on elevation.    Neck - Normal midline excursion of the laryngotracheal complex during swallowing.  Full range of motion on passive movement.  Palpation of the occipital, submental, submandibular, internal jugular chain, and supraclavicular nodes did not demonstrate any abnormal lymph nodes or masses.  Palpation of the thyroid was soft and smooth, with no nodules or goiter appreciated.  The trachea was mobile and midline.  Nose - External contour is symmetric, no gross deflection or scars.  Nasal mucosa is pink and moist with no abnormal mucus.  The septum and turbinates were evaluated:   No polyps, masses, or purulence noted on examination.  NEURO:  equal  strength, neg Romberg, DTR II/IV bilaterally (UE and LE), finger to nose normal, CN intact, ambulates without difficulty.  no focal deficits noted.  [place] Hallpike- Bilateral- Negative. No producible symptoms or nystagmus.     ASSESSMENT/ PLAN:    ICD-10-CM    1. Vertigo  R42 CBC with platelets     Comprehensive metabolic panel (BMP + Alb, Alk Phos, ALT, AST, Total. Bili, TP)     CBC with platelets     Comprehensive metabolic panel (BMP + Alb, Alk Phos, ALT, AST, Total. Bili, TP)      2. Dizziness  R42 CBC with platelets     Comprehensive metabolic panel (BMP + Alb, Alk Phos, ALT, AST, Total.  Bili, TP)     CBC with platelets     Comprehensive metabolic panel (BMP + Alb, Alk Phos, ALT, AST, Total. Bili, TP)      3. Sensorineural hearing loss (SNHL) of both ears  H90.3           Iris reports improvement over the last few days. No producible symptoms on examination today.   She was recommended to use Meclizine PRN  Return to ENT if symptoms worsen  Discussed completing MRI of IAC due to dizziness, but declined at this time. She will consider in future.   If return of symptoms referral to vestibular therapy.   Monitor symptoms.           Silva Robledo PA-C  ENT  [facility], [place]          Again, thank you for allowing me to participate in the care of your patient.        Sincerely,        Silva Robledo PA-C

## 2022-11-22 NOTE — PATIENT INSTRUCTIONS
Use Meclizine as needed  Complete labs today. Will call w/ results    Maintain good water intake  Eat small meals throughout the day    If recurrent episodes- return to ENT and complete audiogram  Consider MRI of Inner ear, vestibular testing.       Thank you for allowing Silva Robledo PA-C and our ENT team to participate in your care.  If your medications are too expensive, please give the nurse a call.  We can possibly change this medication.  If you have a scheduling or an appointment question please contact our Health Unit Coordinator at [phone], Ext. [phone].    ALL nursing questions or concerns can be directed to your ENT nurse at: [phone] Hilda

## 2022-12-01 ENCOUNTER — MYC MEDICAL ADVICE (OUTPATIENT)
Dept: FAMILY MEDICINE | Facility: OTHER | Age: 82
End: 2022-12-01

## 2022-12-02 ENCOUNTER — LAB (OUTPATIENT)
Dept: LAB | Facility: OTHER | Age: 82
End: 2022-12-02
Payer: MEDICARE

## 2022-12-02 DIAGNOSIS — E78.5 HYPERLIPIDEMIA LDL GOAL <130: ICD-10-CM

## 2022-12-02 DIAGNOSIS — M25.50 MULTIPLE JOINT PAIN: ICD-10-CM

## 2022-12-02 DIAGNOSIS — R73.09 ELEVATED GLUCOSE: ICD-10-CM

## 2022-12-02 LAB
ALBUMIN SERPL BCG-MCNC: 4.3 G/DL (ref 3.5–5.2)
ALP SERPL-CCNC: 62 U/L (ref 35–104)
ALT SERPL W P-5'-P-CCNC: 32 U/L (ref 10–35)
ANION GAP SERPL CALCULATED.3IONS-SCNC: 10 MMOL/L (ref 7–15)
AST SERPL W P-5'-P-CCNC: 30 U/L (ref 10–35)
BILIRUB SERPL-MCNC: 0.4 MG/DL
BUN SERPL-MCNC: 17.1 MG/DL (ref 8–23)
CALCIUM SERPL-MCNC: 9.7 MG/DL (ref 8.8–10.2)
CHLORIDE SERPL-SCNC: 104 MMOL/L (ref 98–107)
CHOLEST SERPL-MCNC: 217 MG/DL
CREAT SERPL-MCNC: 0.8 MG/DL (ref 0.51–0.95)
DEPRECATED HCO3 PLAS-SCNC: 27 MMOL/L (ref 22–29)
EST. AVERAGE GLUCOSE BLD GHB EST-MCNC: 126 MG/DL
GFR SERPL CREATININE-BSD FRML MDRD: 72 ML/MIN/1.73M2
GLUCOSE SERPL-MCNC: 103 MG/DL (ref 70–99)
HBA1C MFR BLD: 6 %
HDLC SERPL-MCNC: 67 MG/DL
LDLC SERPL CALC-MCNC: 120 MG/DL
NONHDLC SERPL-MCNC: 150 MG/DL
POTASSIUM SERPL-SCNC: 4.7 MMOL/L (ref 3.4–5.3)
PROT SERPL-MCNC: 7.1 G/DL (ref 6.4–8.3)
SODIUM SERPL-SCNC: 141 MMOL/L (ref 136–145)
TRIGL SERPL-MCNC: 148 MG/DL

## 2022-12-02 PROCEDURE — 86200 CCP ANTIBODY: CPT | Mod: ZL

## 2022-12-02 PROCEDURE — 36415 COLL VENOUS BLD VENIPUNCTURE: CPT | Mod: ZL

## 2022-12-02 PROCEDURE — 82374 ASSAY BLOOD CARBON DIOXIDE: CPT | Mod: ZL

## 2022-12-02 PROCEDURE — 86038 ANTINUCLEAR ANTIBODIES: CPT | Mod: ZL

## 2022-12-02 PROCEDURE — 83036 HEMOGLOBIN GLYCOSYLATED A1C: CPT | Mod: ZL

## 2022-12-02 PROCEDURE — 80061 LIPID PANEL: CPT | Mod: ZL

## 2022-12-02 PROCEDURE — 86431 RHEUMATOID FACTOR QUANT: CPT | Mod: ZL

## 2022-12-05 LAB
ANA SER QL IF: NEGATIVE
RHEUMATOID FACT SER NEPH-ACNC: <6 IU/ML

## 2022-12-06 LAB — CCP AB SER IA-ACNC: <0.4 U/ML

## 2023-01-23 ENCOUNTER — ANCILLARY PROCEDURE (OUTPATIENT)
Dept: GENERAL RADIOLOGY | Facility: OTHER | Age: 83
End: 2023-01-23
Attending: FAMILY MEDICINE
Payer: MEDICARE

## 2023-01-23 ENCOUNTER — OFFICE VISIT (OUTPATIENT)
Dept: FAMILY MEDICINE | Facility: OTHER | Age: 83
End: 2023-01-23
Attending: FAMILY MEDICINE
Payer: MEDICARE

## 2023-01-23 VITALS
RESPIRATION RATE: 18 BRPM | TEMPERATURE: 97 F | HEART RATE: 71 BPM | DIASTOLIC BLOOD PRESSURE: 60 MMHG | SYSTOLIC BLOOD PRESSURE: 120 MMHG | WEIGHT: 172 LBS | BODY MASS INDEX: 30.47 KG/M2 | OXYGEN SATURATION: 97 %

## 2023-01-23 DIAGNOSIS — M79.671 RIGHT FOOT PAIN: ICD-10-CM

## 2023-01-23 DIAGNOSIS — I10 BENIGN ESSENTIAL HYPERTENSION: ICD-10-CM

## 2023-01-23 DIAGNOSIS — M81.0 AGE-RELATED OSTEOPOROSIS WITHOUT CURRENT PATHOLOGICAL FRACTURE: ICD-10-CM

## 2023-01-23 DIAGNOSIS — K52.832 LYMPHOCYTIC COLITIS: ICD-10-CM

## 2023-01-23 DIAGNOSIS — E66.9 OBESITY WITHOUT SERIOUS COMORBIDITY, UNSPECIFIED CLASSIFICATION, UNSPECIFIED OBESITY TYPE: ICD-10-CM

## 2023-01-23 DIAGNOSIS — Z78.0 POST-MENOPAUSAL: ICD-10-CM

## 2023-01-23 DIAGNOSIS — R25.2 LEG CRAMPS: ICD-10-CM

## 2023-01-23 DIAGNOSIS — R79.9 ABNORMAL FINDING OF BLOOD CHEMISTRY, UNSPECIFIED: ICD-10-CM

## 2023-01-23 DIAGNOSIS — Z00.00 ENCOUNTER FOR MEDICARE ANNUAL WELLNESS EXAM: Primary | ICD-10-CM

## 2023-01-23 LAB
FERRITIN SERPL-MCNC: 87 NG/ML (ref 11–328)
IRON BINDING CAPACITY (ROCHE): 338 UG/DL (ref 240–430)
IRON SATN MFR SERPL: 16 % (ref 15–46)
IRON SERPL-MCNC: 53 UG/DL (ref 37–145)
MAGNESIUM SERPL-MCNC: 2.4 MG/DL (ref 1.7–2.3)

## 2023-01-23 PROCEDURE — G0439 PPPS, SUBSEQ VISIT: HCPCS | Performed by: FAMILY MEDICINE

## 2023-01-23 PROCEDURE — 83735 ASSAY OF MAGNESIUM: CPT | Mod: ZL | Performed by: FAMILY MEDICINE

## 2023-01-23 PROCEDURE — 82728 ASSAY OF FERRITIN: CPT | Mod: ZL | Performed by: FAMILY MEDICINE

## 2023-01-23 PROCEDURE — 83550 IRON BINDING TEST: CPT | Mod: ZL | Performed by: FAMILY MEDICINE

## 2023-01-23 PROCEDURE — 99213 OFFICE O/P EST LOW 20 MIN: CPT | Mod: 25 | Performed by: FAMILY MEDICINE

## 2023-01-23 PROCEDURE — 73630 X-RAY EXAM OF FOOT: CPT | Mod: TC,RT

## 2023-01-23 PROCEDURE — 36415 COLL VENOUS BLD VENIPUNCTURE: CPT | Mod: ZL | Performed by: FAMILY MEDICINE

## 2023-01-23 RX ORDER — ALENDRONATE SODIUM 70 MG/1
70 TABLET ORAL
Qty: 12 TABLET | Refills: 3 | Status: SHIPPED | OUTPATIENT
Start: 2023-01-23 | End: 2023-01-31

## 2023-01-23 SDOH — HEALTH STABILITY: PHYSICAL HEALTH: ON AVERAGE, HOW MANY MINUTES DO YOU ENGAGE IN EXERCISE AT THIS LEVEL?: 20 MIN

## 2023-01-23 SDOH — HEALTH STABILITY: PHYSICAL HEALTH: ON AVERAGE, HOW MANY DAYS PER WEEK DO YOU ENGAGE IN MODERATE TO STRENUOUS EXERCISE (LIKE A BRISK WALK)?: 6 DAYS

## 2023-01-23 ASSESSMENT — ENCOUNTER SYMPTOMS
HEMATURIA: 0
FREQUENCY: 0
BREAST MASS: 0
PALPITATIONS: 0
SORE THROAT: 0
DYSURIA: 0
WEAKNESS: 0
HEARTBURN: 0
ARTHRALGIAS: 1
HEMATOCHEZIA: 0
SHORTNESS OF BREATH: 0
COUGH: 0
ABDOMINAL PAIN: 0
NERVOUS/ANXIOUS: 0
JOINT SWELLING: 1
CHILLS: 0
NAUSEA: 0
FEVER: 0
MYALGIAS: 1
DIARRHEA: 0
CONSTIPATION: 0
EYE PAIN: 0
DIZZINESS: 0
PARESTHESIAS: 0
HEADACHES: 0

## 2023-01-23 ASSESSMENT — LIFESTYLE VARIABLES
SKIP TO QUESTIONS 9-10: 1
AUDIT-C TOTAL SCORE: 1
HOW OFTEN DO YOU HAVE SIX OR MORE DRINKS ON ONE OCCASION: NEVER
HOW MANY STANDARD DRINKS CONTAINING ALCOHOL DO YOU HAVE ON A TYPICAL DAY: 1 OR 2
HOW OFTEN DO YOU HAVE A DRINK CONTAINING ALCOHOL: MONTHLY OR LESS

## 2023-01-23 ASSESSMENT — ACTIVITIES OF DAILY LIVING (ADL): CURRENT_FUNCTION: NO ASSISTANCE NEEDED

## 2023-01-23 ASSESSMENT — PATIENT HEALTH QUESTIONNAIRE - PHQ9
10. IF YOU CHECKED OFF ANY PROBLEMS, HOW DIFFICULT HAVE THESE PROBLEMS MADE IT FOR YOU TO DO YOUR WORK, TAKE CARE OF THINGS AT HOME, OR GET ALONG WITH OTHER PEOPLE: NOT DIFFICULT AT ALL
SUM OF ALL RESPONSES TO PHQ QUESTIONS 1-9: 7
SUM OF ALL RESPONSES TO PHQ QUESTIONS 1-9: 7

## 2023-01-23 NOTE — PATIENT INSTRUCTIONS
Patient Education   Personalized Prevention Plan  You are due for the preventive services outlined below.  Your care team is available to assist you in scheduling these services.  If you have already completed any of these items, please share that information with your care team to update in your medical record.  Health Maintenance Due   Topic Date Due     Zoster (Shingles) Vaccine (1 of 2) Never done     Annual Wellness Visit  10/31/2020     Pneumococcal Vaccine (2 - PPSV23 if available, else PCV20) 10/31/2020     Osteoporosis Screening  11/08/2021     Your Health Risk Assessment indicates you feel you are not in good emotional health.    Recreation   Recreation is not limited to sports and team events. It includes any activity that provides relaxation, interest, enjoyment, and exercise. Recreation provides an outlet for physical, mental, and social energy. It can give a sense of worth and achievement. It can help you stay healthy.    Mental Exercise and Social Involvement  Mental and emotional health is as important as physical health. Keep in touch with friends and family. Stay as active as possible. Continue to learn and challenge yourself.   Things you can do to stay mentally active are:    Learn something new, like a foreign language or musical instrument.     Play SCRABBLE or do crossword puzzles. If you cannot find people to play these games with you at home, you can play them with others on your computer through the Internet.     Join a games club--anything from card games to chess or checkers or lawn bowling.     Start a new hobby.     Go back to school.     Volunteer.     Read.   Keep up with world events.    Depression and Suicide in Older Adults    Nearly 2 million older Americans have some type of depression. Some of them even take their own lives. Yet depression among older adults is often ignored. Learn the warning signs. You may help spare a loved one needless pain. You may also save a life.  "  What is depression?  Depression is a common and serious illness that affects the way you think and feel. It is not a normal part of aging, nor is it a sign of weakness, a character flaw, or something you can snap out of. Most people with depression need treatment to get better. The most common symptom is a feeling of deep sadness. People who are depressed also may seem tired and listless. And nothing seems to give them pleasure. It s normal to grieve or be sad sometimes. But sadness lessens or passes with time. Depression rarely goes away or improves on its own. A person with clinical depression can't \"snap out of it.\" Other symptoms of depression are:     Sleeping more or less than normal    Eating more or less than normal    Having headaches, stomachaches, or other pains that don t go away    Feeling nervous,  empty,  or worthless    Crying a great deal    Thinking or talking about suicide or death    Loss of interest in activities previously enjoyed    Social isolation    Feeling confused or forgetful  What causes it?  The causes of depression aren t fully known. But it is thought to result from a complex blend of these factors:     Biochemistry. Certain chemicals in the brain play a role.    Genes. Depression does run in families.    Life stress. Life stresses can also trigger depression in some people. Older adults often face many stressors, such as death of friends or a spouse, health problems, and financial concerns.    Chronic conditions. This includes conditions such as diabetes, heart disease, or cancer. These can cause symptoms of depression. Medicine side effects can cause changes in thoughts and behaviors.  How you can help  Often, depressed people may not want to ask for help. When they do, they may be ignored. Or, they may receive the wrong treatment. You can help by showing parents and older friends love and support. If they seem depressed, don t lecture the person, ignore the symptoms, or discount " the symptoms as a  normal  part of aging -which they are not. Get involved, listen, and show interest and support.   Help them understand that depression is a treatable illness. Tell them you can help them find the right treatment. Offer to go to their healthcare provider's appointment with them for support when the symptoms are discussed. With their approval, contact a local mental health center, social service agency, or hospital about services.   You can be an advocate for him or her at healthcare appointments. Many older adults have chronic illnesses that can cause symptoms of depression. Medicine side effects can change thoughts and behaviors. You can help make sure that the healthcare provider looks at all of these factors. He or she should refer your family member or friend to a mental healthcare provider when needed. in some cases, untreated depression can lead to a misdiagnosis. A person may be diagnosed with a brain disorder such as dementia. If the healthcare provider does not take the issue of depression seriously, help your family member or friend to find another provider.   Don't be afraid to ask  If you think an older person you care about could be suicidal, ask,  Have you thought about suicide?  Most people will tell you the truth. If they say  yes,  they may already have a plan for how and when they will attempt it. Find out as much as you can. The more detailed the plan, and the easier it is to carry out, the more danger the person is in right now. Tell the person you are there for them and do not want them to harm him or herself. Don't wait to get help for the person. Call the person's healthcare provider, local hospital, or emergency services.   To learn more    National Suicide Prevention Lifeline (crisis hotline) 946-302-PCQX (181-077-2716)    National [place] of Mental Psoeup988-792-8861wvc.nimh.nih.gov    National [place] on Mental Mojadmv396-418-7143fjz.michael.org    Mental Health  Shezhul503-681-8317cuy.[facility].org    National Suicide Fodfuut124-JBIAHFK (727-052-8306)    Call 911  Never leave the person alone. A person who is actively suicidal needs psychiatric care right away. They will need constant supervision. Never leave the person out of sight. Call 911 or the national 24-hour suicide crisis hotline at 534-113-ZGPR (983-338-1883). You can also take the person to the closest emergency room.   Jessica last reviewed this educational content on 5/1/2020 2000-2021 [employer]. All rights reserved. This information is not intended as a substitute for professional medical care. Always follow your healthcare professional's instructions.

## 2023-01-23 NOTE — PROGRESS NOTES
"SUBJECTIVE:   Iris is a 82 year old who presents for Preventive Visit.  Patient has been advised of split billing requirements and indicates understanding: Yes  Are you in the first 12 months of your Medicare coverage?  No    Healthy Habits:     In general, how would you rate your overall health?  Good    Frequency of exercise:  6-7 days/week    Duration of exercise:  Greater than 60 minutes    Do you usually eat at least 4 servings of fruit and vegetables a day, include whole grains    & fiber and avoid regularly eating high fat or \"junk\" foods?  Yes    Taking medications regularly:  Yes    Medication side effects:  None    Ability to successfully perform activities of daily living:  No assistance needed    Home Safety:  No safety concerns identified    Hearing Impairment:  No hearing concerns    In the past 6 months, have you been bothered by leaking of urine?  No    In general, how would you rate your overall mental or emotional health?  Poor      PHQ-2 Total Score: 3    Additional concerns today:  No      Have you ever done Advance Care Planning? (For example, a Health Directive, POLST, or a discussion with a medical provider or your loved ones about your wishes): No, advance care planning information given to patient to review.  Patient declined advance care planning discussion at this time.       Fall risk  Fallen 2 or more times in the past year?: No  Any fall with injury in the past year?: No    Cognitive Screening   1) Repeat 3 items (Leader, Season, Table)    2) Clock draw: NORMAL  3) 3 item recall: Recalls 3 objects  Results: 3 items recalled: COGNITIVE IMPAIRMENT LESS LIKELY    Mini-CogTM Copyright LACY Burnette. Licensed by the author for use in [facility]; reprinted with permission ([email or web address]). All rights reserved.      Do you have sleep apnea, excessive snoring or daytime drowsiness?: no    Reviewed and updated as needed this visit by clinical staff   Tobacco  Allergies  Meds  " Problems             Reviewed and updated as needed this visit by Provider      Problems            Social History     Tobacco Use     Smoking status: Former     Packs/day: 0.00     Types: Cigarettes     Smokeless tobacco: Never     Tobacco comments:     Was only a sort time 1971 to 1973   Substance Use Topics     Alcohol use: Yes     Comment: Wine occasionally         Alcohol Use 1/23/2023   Prescreen: >3 drinks/day or >7 drinks/week? No     Abnormal Mood Symptoms      Duration: few months    Description:  Depression: no   Anxiety: no  Panic attacks: no     Accompanying signs and symptoms: see PHQ-9 and MILES scores    History (similar episodes/previous evaluation): low motivation    Precipitating or alleviating factors: sister and brother in law are dying from alzheimers and her  has dementia now as well, she hasn't been able to go to casinos overnight like she used to    Therapies tried and outcome: none    Hypertension Follow-up      Do you check your blood pressure regularly outside of the clinic? No     Are you following a low salt diet? No    Are your blood pressures ever more than 140 on the top number (systolic) OR more   than 90 on the bottom number (diastolic), for example 140/90? No      Current providers sharing in care for this patient include:   Patient Care Team:  Baylee Rodríguez MD as PCP - General (Family Medicine)  Silva Robledo PA-C as Assigned Surgical Provider  Baylee Rodríguez MD as Assigned PCP    The following health maintenance items are reviewed in Epic and correct as of today:  Health Maintenance   Topic Date Due     ZOSTER IMMUNIZATION (1 of 2) Never done     MEDICARE ANNUAL WELLNESS VISIT  10/31/2020     Pneumococcal Vaccine: 65+ Years (2 - PPSV23 if available, else PCV20) 10/31/2020     DEXA  11/08/2021     DTAP/TDAP/TD IMMUNIZATION (3 - Td or Tdap) 03/28/2023     FALL RISK ASSESSMENT  01/23/2024     ADVANCE CARE PLANNING  01/23/2028     PHQ-2 (once per calendar year)   Completed     INFLUENZA VACCINE  Completed     COVID-19 Vaccine  Completed     IPV IMMUNIZATION  Aged Out     MENINGITIS IMMUNIZATION  Aged Out     COLORECTAL CANCER SCREENING  Discontinued     Lab work is in process  Labs reviewed in EPIC  BP Readings from Last 3 Encounters:   01/23/23 120/60   11/22/22 134/72   11/18/22 132/58    Wt Readings from Last 3 Encounters:   01/23/23 78 kg (172 lb)   11/22/22 77.1 kg (170 lb)   11/18/22 77.1 kg (170 lb)                  Patient Active Problem List   Diagnosis     Knee pain     Elevated LFTs     Sleep related leg cramps     Preventative health care     Elevated glucose     Mammogram declined     Advanced care planning/counseling discussion     Benign essential hypertension     Hyperlipidemia LDL goal <130     Anxiety     Age-related osteoporosis without current pathological fracture     Lymphocytic colitis     Caregiver stress     Multiple joint pain     Benign paroxysmal positional vertigo due to bilateral vestibular disorder     Obesity without serious comorbidity, unspecified classification, unspecified obesity type     Post-menopausal     Right foot pain     Leg cramps     Past Surgical History:   Procedure Laterality Date     APPENDECTOMY  01/01/1944     AS REVISE TOTAL HIP REPLACEMENT Left 2018    Dr Whaley     CHOLECYSTECTOMY  01/01/1979     colonoscopies  1998, 2003, 2007, 2009, 9/18/2014    Colonic polyps. Repeat colonoscopy 2013     Declines further mammograms  01/01/2009     Left superior and inferior pubic rami fractures  01/01/2006     ORTHOPEDIC SURGERY Right 01/01/2011    total right hip. Dr. Nelson     PACs, PVCs, SVT, bradycardia on Holter Moniter  01/01/2009    Dr. Pace     Urethral dilation  01/01/2003    Urethral stricture       Social History     Tobacco Use     Smoking status: Former     Packs/day: 0.00     Types: Cigarettes     Smokeless tobacco: Never     Tobacco comments:     Was only a sort time 1971 to 1973   Substance Use Topics     Alcohol  use: Yes     Comment: Wine occasionally     Family History   Problem Relation Age of Onset     Diabetes Mother 59         age 59 of pulmonar embolism  after amputation of leg due to diabetes     Alzheimer Disease Father 82     Coronary Artery Disease Sister         Heart attack     Colon Cancer Sister 84     Alzheimer Disease Sister      Colon Cancer Brother 69     Lung Cancer Brother      Prostate Cancer Brother      Unknown/Adopted Maternal Grandmother      Unknown/Adopted Maternal Grandfather      Unknown/Adopted Paternal Grandmother      Unknown/Adopted Paternal Grandfather      Cancer Other         Colon-family h/o         Current Outpatient Medications   Medication Sig Dispense Refill     alendronate (FOSAMAX) 70 MG tablet Take 1 tablet (70 mg) by mouth every 7 days 12 tablet 3     Calcium Carbonate-Vit D-Min (CALCIUM 1200 PO) Take  by mouth. Take 1 daily       esomeprazole (NEXIUM) 40 MG DR capsule Take 1 capsule (40 mg) by mouth every morning (before breakfast) Take 30-60 minutes before eating. 90 capsule 3     multivitamin (OCUVITE) TABS tablet Take 1 tablet by mouth daily 90 each 3     rosuvastatin (CRESTOR) 20 MG tablet Take 1 tablet (20 mg) by mouth daily 90 tablet 3     Semaglutide 14 MG TABS Take 14 mg by mouth daily 30 tablet 1     UNABLE TO FIND 1 tablet daily MEDICATION NAME: Tumeric       ipratropium (ATROVENT) 0.06 % nasal spray Spray 2 sprays into both nostrils 4 times daily as needed for rhinitis (Patient not taking: Reported on 2023) 45 mL 1     Lactobacillus (ACIDOPHILUS PO) Take  by mouth. 1 daily (Patient not taking: Reported on 2023)       meclizine (ANTIVERT) 25 MG tablet Take 1 tablet (25 mg) by mouth 3 times daily as needed for dizziness (Patient not taking: Reported on 2023) 20 tablet 0     Semaglutide (RYBELSUS) 3 MG TABS Take 3 mg by mouth daily (Patient not taking: Reported on 2023) 30 tablet 0     Semaglutide (RYBELSUS) 7 MG TABS Take 7 mg by mouth daily  (Patient not taking: Reported on 1/23/2023) 30 tablet 0     Allergies   Allergen Reactions     Nabumetone Rash     Acetaminophen Nausea     Lortab     Hydrocodone Bitartrate Nausea     Lortab     Naproxen Nausea     Naprosyn     Rofecoxib      Upsets stomach  Vioxx     Pneumonia Vaccine:For adults 65 years or older who do not have an immunocompromising condition, cerebrospinal fluid leak, or cochlear implant and want to receive PCV13 AND PPSV23: Administer 1 dose of PCV13 first then give 1 dose of PPSV23 at least 1 year later. If the patient already received PPSV23, give the dose of PCV13 at least 1 year after they received the most recent dose of PPSV23. Anyone who received any doses of PPSV23 before age 65 should receive 1 final dose of the vaccine at age 65 or older. Administer this last dose at least 5 years after the prior PPSV23 dose.  Mammogram Screening: Mammogram Screening - Patient over age 75, has elected to continue with screening.  Last 3 Pap and HPV Results:    Pertinent mammograms are reviewed under the imaging tab.    Review of Systems   Constitutional: Negative for chills and fever.   HENT: Negative for congestion, ear pain, hearing loss and sore throat.    Eyes: Negative for pain and visual disturbance.   Respiratory: Negative for cough and shortness of breath.    Cardiovascular: Negative for chest pain, palpitations and peripheral edema.   Gastrointestinal: Negative for abdominal pain, constipation, diarrhea, heartburn, hematochezia and nausea.   Breasts:  Negative for tenderness, breast mass and discharge.   Genitourinary: Negative for dysuria, frequency, genital sores, hematuria, pelvic pain, urgency, vaginal bleeding and vaginal discharge.   Musculoskeletal: Positive for arthralgias, joint swelling and myalgias.   Skin: Negative for rash.   Neurological: Negative for dizziness, weakness, headaches and paresthesias.   Psychiatric/Behavioral: Negative for mood changes. The patient is not  "nervous/anxious.      Constitutional, HEENT, cardiovascular, pulmonary, gi and gu systems are negative, except as otherwise noted.    OBJECTIVE:   /60 (BP Location: Left arm, Patient Position: Sitting, Cuff Size: Adult Regular)   Pulse 71   Temp 97  F (36.1  C) (Tympanic)   Resp 18   Wt 78 kg (172 lb)   SpO2 97%   BMI 30.47 kg/m   Estimated body mass index is 30.47 kg/m  as calculated from the following:    Height as of 11/22/22: 1.6 m (5' 3\").    Weight as of this encounter: 78 kg (172 lb).  Physical Exam  GENERAL: healthy, alert and no distress  NECK: no adenopathy, no asymmetry, masses, or scars and thyroid normal to palpation  RESP: lungs clear to auscultation - no rales, rhonchi or wheezes  CV: regular rate and rhythm, normal S1 S2, no S3 or S4, no murmur, click or rub, no peripheral edema and peripheral pulses strong  ABDOMEN: soft, nontender, no hepatosplenomegaly, no masses and bowel sounds normal  MS: no gross musculoskeletal defects noted, no edema  MS: decreased range of motion right mid  when walking, no pain on palpation  PSYCH: mentation appears normal, affect normal/bright    Diagnostic Test Results:  Labs reviewed in Epic  Results for orders placed or performed in visit on 01/23/23   XR FOOT RT G/E 3 VW (Clinic Performed)     Status: None    Narrative    PROCEDURE:  XR FOOT RIGHT G/E 3 VIEWS    HISTORY: can't bear weight -- pain is mid-foot, no injury; Right foot  pain    COMPARISON:  None.    TECHNIQUE:  Three-view right foot    FINDINGS:  No fracture or dislocation is identified. The joint spaces  are preserved. Calcaneal plantar enthesopathy. Arterial vascular  calcifications.       Impression    IMPRESSION:   No acute osseous abnormality. If there is persistent clinical concern,  MR could be useful to further characterize.    ELLEN RIVERO MD         SYSTEM ID:  [id number]   Results for orders placed or performed in visit on 01/23/23   Magnesium     Status: Abnormal   Result " Value Ref Range    Magnesium 2.4 (H) 1.7 - 2.3 mg/dL   Ferritin     Status: Normal   Result Value Ref Range    Ferritin 87 11 - 328 ng/mL   Iron and iron binding capacity     Status: Normal   Result Value Ref Range    Iron 53 37 - 145 ug/dL    Iron Binding Capacity 338 240 - 430 ug/dL    Iron Sat Index 16 15 - 46 %       ASSESSMENT / PLAN:   (Z00.00) Encounter for Medicare annual wellness exam  (primary encounter diagnosis)  Comment:   Plan: follow-up 1 year    (E66.9) Obesity without serious comorbidity, unspecified classification, unspecified obesity type  Comment:   Plan: Semaglutide 14 MG TABS        Hasn't started the 3mg yet, wanted to talk about it again, will try now and follow-up 2-3 mos    (M81.0) Age-related osteoporosis without current pathological fracture  Comment:   Plan: alendronate (FOSAMAX) 70 MG tablet        Due for dexa    (Z78.0) Post-menopausal  Comment:   Plan: DX Hip/Pelvis/Spine        Willing to do it    (I10) Benign essential hypertension  Comment:   Plan: stable    (K52.832) Lymphocytic colitis  Comment:   Plan: stable    (M79.671) Right foot pain  Comment:   Plan: XR FOOT RT G/E 3 VW (Clinic Performed)        Xray normal, if not improving, will get MRI    (R25.2) Leg cramps  Comment:   Plan: Iron and iron binding capacity, Ferritin,         Magnesium          (R79.9) Abnormal finding of blood chemistry, unspecified  Comment:   Plan: Iron and iron binding capacity, Ferritin            COUNSELING:  Reviewed preventive health counseling, as reflected in patient instructions  Special attention given to:       Regular exercise       Healthy diet/nutrition       Vision screening       Hearing screening        She reports that she has quit smoking. Her smoking use included cigarettes. She has never used smokeless tobacco.      Appropriate preventive services were discussed with this patient, including applicable screening as appropriate for cardiovascular disease, diabetes,  osteopenia/osteoporosis, and glaucoma.  As appropriate for age/gender, discussed screening for colorectal cancer, prostate cancer, breast cancer, and cervical cancer. Checklist reviewing preventive services available has been given to the patient.    Reviewed patients plan of care and provided an AVS. The Intermediate Care Plan ( asthma action plan, low back pain action plan, and migraine action plan) for Iris meets the Care Plan requirement. This Care Plan has been established and reviewed with the Patient.          Baylee Rodríguez MD  [facility]    Identified Health Risks:  Answers for HPI/ROS submitted by the patient on 1/23/2023  If you checked off any problems, how difficult have these problems made it for you to do your work, take care of things at home, or get along with other people?: Not difficult at all  PHQ9 TOTAL SCORE: 7

## 2023-01-23 NOTE — PROGRESS NOTES
"Answers for HPI/ROS submitted by the patient on 1/23/2023  If you checked off any problems, how difficult have these problems made it for you to do your work, take care of things at home, or get along with other people?: Not difficult at all  PHQ9 TOTAL SCORE: 7  In general, how would you rate your overall physical health?: good  Frequency of exercise:: 6-7 days/week  Do you usually eat at least 4 servings of fruit and vegetables a day, include whole grains & fiber, and avoid regularly eating high fat or \"junk\" foods? : Yes  Taking medications regularly:: Yes  Medication side effects:: None  Activities of Daily Living: no assistance needed  Home safety: no safety concerns identified  Hearing Impairment:: no hearing concerns  In the past 6 months, have you been bothered by leaking of urine?: No  abdominal pain: No  Blood in stool: No  Blood in urine: No  chest pain: No  chills: No  congestion: No  constipation: No  cough: No  diarrhea: No  dizziness: No  ear pain: No  eye pain: No  nervous/anxious: No  fever: No  frequency: No  genital sores: No  headaches: No  hearing loss: No  heartburn: No  arthralgias: Yes  joint swelling: Yes  peripheral edema: No  mood changes: No  myalgias: Yes  nausea: No  dysuria: No  palpitations: No  Skin sensation changes: No  sore throat: No  urgency: No  rash: No  shortness of breath: No  visual disturbance: No  weakness: No  pelvic pain: No  vaginal bleeding: No  vaginal discharge: No  tenderness: No  breast mass: No  breast discharge: No  In general, how would you rate your overall mental or emotional health?: poor  Additional concerns today:: No  Duration of exercise:: Greater than 60 minutes        The patient was provided with suggestions to help her develop a healthy emotional lifestyle.  The patient's PHQ-9 score is consistent with mild depression. She was provided with information regarding depression and was advised to schedule a follow up appointment in 8 weeks to further " address this issue.

## 2023-01-24 ENCOUNTER — MYC MEDICAL ADVICE (OUTPATIENT)
Dept: FAMILY MEDICINE | Facility: OTHER | Age: 83
End: 2023-01-24

## 2023-01-24 DIAGNOSIS — E61.1 IRON DEFICIENCY: Primary | ICD-10-CM

## 2023-01-25 RX ORDER — FERROUS SULFATE 325(65) MG
325 TABLET ORAL
Qty: 90 TABLET | Refills: 0 | Status: SHIPPED | OUTPATIENT
Start: 2023-01-25 | End: 2023-03-29

## 2023-01-27 ENCOUNTER — HOSPITAL ENCOUNTER (OUTPATIENT)
Dept: BONE DENSITY | Facility: HOSPITAL | Age: 83
Discharge: HOME | End: 2023-01-27
Attending: FAMILY MEDICINE | Admitting: FAMILY MEDICINE
Payer: MEDICARE

## 2023-01-27 DIAGNOSIS — Z78.0 POST-MENOPAUSAL: ICD-10-CM

## 2023-01-27 PROCEDURE — 77080 DXA BONE DENSITY AXIAL: CPT

## 2023-01-30 ENCOUNTER — MYC MEDICAL ADVICE (OUTPATIENT)
Dept: FAMILY MEDICINE | Facility: OTHER | Age: 83
End: 2023-01-30

## 2023-01-31 RX ORDER — ALENDRONATE SODIUM 70 MG/1
70 TABLET ORAL
Qty: 12 TABLET | Refills: 3 | Status: SHIPPED | OUTPATIENT
Start: 2023-01-31 | End: 2024-02-06

## 2023-02-01 NOTE — TELEPHONE ENCOUNTER
I spoke with pt, Pt just started the 3mg of rybelsys and is currently on the 8th day. 1mo after she will start the 7mg for 1 month, then pt will go up to the 14mg. Dr Rodríguez just sent the 14mg of rybelsus so the pt had it. The rx was sent to the wrong pharmacy and needs to go to [facility].

## 2023-02-01 NOTE — PROGRESS NOTES
I spoke with pt, Pt just started the 3mg of rybelsys and is currently on the 8th day. 1mo after she will start the 7mg for 1 month, then pt will go up to the 14mg. Dr Rodríguez just sent the 14mg of rybelsus so the pt had it. The rx was sent to the wrong pharmacy and needs to go to [facility].

## 2023-02-08 ENCOUNTER — TRANSFERRED RECORDS (OUTPATIENT)
Dept: HEALTH INFORMATION MANAGEMENT | Facility: CLINIC | Age: 83
End: 2023-02-08

## 2023-02-24 ENCOUNTER — OFFICE VISIT (OUTPATIENT)
Dept: FAMILY MEDICINE | Facility: OTHER | Age: 83
End: 2023-02-24
Attending: FAMILY MEDICINE
Payer: MEDICARE

## 2023-02-24 ENCOUNTER — ANCILLARY PROCEDURE (OUTPATIENT)
Dept: GENERAL RADIOLOGY | Facility: OTHER | Age: 83
End: 2023-02-24
Attending: FAMILY MEDICINE
Payer: MEDICARE

## 2023-02-24 VITALS
HEART RATE: 78 BPM | RESPIRATION RATE: 16 BRPM | OXYGEN SATURATION: 98 % | TEMPERATURE: 97.2 F | SYSTOLIC BLOOD PRESSURE: 146 MMHG | BODY MASS INDEX: 28.8 KG/M2 | DIASTOLIC BLOOD PRESSURE: 68 MMHG | WEIGHT: 162.6 LBS

## 2023-02-24 DIAGNOSIS — M54.6 ACUTE BILATERAL THORACIC BACK PAIN: ICD-10-CM

## 2023-02-24 DIAGNOSIS — M54.50 ACUTE BILATERAL LOW BACK PAIN WITHOUT SCIATICA: ICD-10-CM

## 2023-02-24 DIAGNOSIS — M81.0 AGE RELATED OSTEOPOROSIS, UNSPECIFIED PATHOLOGICAL FRACTURE PRESENCE: ICD-10-CM

## 2023-02-24 DIAGNOSIS — M81.0 AGE RELATED OSTEOPOROSIS, UNSPECIFIED PATHOLOGICAL FRACTURE PRESENCE: Primary | ICD-10-CM

## 2023-02-24 PROCEDURE — 72070 X-RAY EXAM THORAC SPINE 2VWS: CPT | Mod: TC

## 2023-02-24 PROCEDURE — 72100 X-RAY EXAM L-S SPINE 2/3 VWS: CPT | Mod: TC

## 2023-02-24 PROCEDURE — 99213 OFFICE O/P EST LOW 20 MIN: CPT | Performed by: FAMILY MEDICINE

## 2023-02-24 PROCEDURE — G0463 HOSPITAL OUTPT CLINIC VISIT: HCPCS

## 2023-02-24 RX ORDER — TRAMADOL HYDROCHLORIDE 50 MG/1
25-50 TABLET ORAL EVERY 6 HOURS PRN
Qty: 10 TABLET | Refills: 0 | Status: SHIPPED | OUTPATIENT
Start: 2023-02-24 | End: 2023-02-27

## 2023-02-24 RX ORDER — LIDOCAINE 50 MG/G
1 PATCH TOPICAL EVERY 24 HOURS
Qty: 10 PATCH | Refills: 1 | Status: SHIPPED | OUTPATIENT
Start: 2023-02-24 | End: 2023-03-29

## 2023-02-24 RX ORDER — CYCLOBENZAPRINE HCL 5 MG
5-10 TABLET ORAL 3 TIMES DAILY PRN
Qty: 20 TABLET | Refills: 1 | Status: SHIPPED | OUTPATIENT
Start: 2023-02-24 | End: 2023-06-14

## 2023-02-24 ASSESSMENT — PAIN SCALES - GENERAL: PAINLEVEL: WORST PAIN (10)

## 2023-02-24 NOTE — PATIENT INSTRUCTIONS
Biofreeze topical  Lidocaine patches to the most painful spot  Heating pad or warm tub/shower  Muscle relaxer 2-3x/day  Use tramadol sparingly

## 2023-02-24 NOTE — PROGRESS NOTES
Assessment & Plan     Age related osteoporosis, unspecified pathological fracture presence  No compression fractures, xrays stable since 2018   - XR LUMBAR SPINE 2/3 VIEWS (Clinic Performed); Future  - Chiropractic Referral    Acute bilateral thoracic back pain  Have acute worsening of pain bringing her to tears lately  Her husbands dementia is worse lately  - XR Thoracic Spine 2 Views (Clinic Performed); Future  - XR LUMBAR SPINE 2/3 VIEWS (Clinic Performed); Future  - cyclobenzaprine (FLEXERIL) 5 MG tablet; Take 1-2 tablets (5-10 mg) by mouth 3 times daily as needed for muscle spasms  - lidocaine (LIDODERM) 5 % patch; Place 1 patch onto the skin every 24 hours To prevent lidocaine toxicity, patient should be patch free for 12 hrs daily.  - traMADol (ULTRAM) 50 MG tablet; Take 0.5-1 tablets (25-50 mg) by mouth every 6 hours as needed for severe pain (7-10)  - Chiropractic Referral    Acute bilateral low back pain without sciatica    - cyclobenzaprine (FLEXERIL) 5 MG tablet; Take 1-2 tablets (5-10 mg) by mouth 3 times daily as needed for muscle spasms  - lidocaine (LIDODERM) 5 % patch; Place 1 patch onto the skin every 24 hours To prevent lidocaine toxicity, patient should be patch free for 12 hrs daily.  - traMADol (ULTRAM) 50 MG tablet; Take 0.5-1 tablets (25-50 mg) by mouth every 6 hours as needed for severe pain (7-10)  - Chiropractic Referral    Provider  Link to [facility] Help Grid :[id number]}    Patient was agreeable to this plan and had no further questions.  Patient Instructions   1.  Biofreeze topical  2. Lidocaine patches to the most painful spot  3. Heating pad or warm tub/shower  4. Muscle relaxer 2-3x/day  5. Use tramadol sparingly      No follow-ups on file.    Baylee Rodríguez MD  [facility] - ANDREI Simmons is a 82 year old, presenting for the following health issues:  Musculoskeletal Problem      HPI     Pain History:  When did you first notice your pain? - Acute Pain    Have you seen anyone else for your pain? No  Where in your body do you have pain? Back Pain  Onset/Duration: 3 DAYS   Description:   Location of pain: middle of back bilateral and upper back bilateral  Character of pain: spasms   Pain radiation: radiates into neck   New numbness or weakness in legs, not attributed to pain: no   Intensity: Currently 10/10, At its worst 10/10  Progression of Symptoms: worsening  History:   Specific cause: none  Pain interferes with job: N/A  History of back problems: previous - had this before but not as long and as bad   Any previous MRI or X-rays: None  Sees a specialist for back pain: No  Alleviating factors:   Improved by: heat if she does not move at all.    Precipitating factors:  Worsened by: just moving, turning head.   Therapies tried and outcome: acetaminophen (Tylenol), cold, heat and NSAIDs    Accompanying Signs & Symptoms:  Risk of Fracture: Osteoporosis  Risk of Cauda Equina: None  Risk of Infection: None  Risk of Cancer: None  Risk of Ankylosing Spondylitis: Onset at age <35, male, AND morning back stiffness No    Review of Systems   Constitutional, HEENT, cardiovascular, pulmonary, gi and gu systems are negative, except as otherwise noted.      Objective    BP (!) 146/68 (BP Location: Right arm, Patient Position: Chair, Cuff Size: Adult Regular)   Pulse 78   Temp 97.2  F (36.2  C) (Tympanic)   Resp 16   Wt 73.8 kg (162 lb 9.6 oz)   SpO2 98%   BMI 28.80 kg/m    Body mass index is 28.8 kg/m .  Physical Exam   GENERAL: healthy, alert and no distress  RESP: lungs clear to auscultation - no rales, rhonchi or wheezes  CV: regular rate and rhythm, normal S1 S2, no S3 or S4, no murmur, click or rub, no peripheral edema and peripheral pulses strong  MS: decreased range of motion lumbar and thoracic spine  PSYCH: mentation appears normal, affect normal/bright    Results for orders placed or performed in visit on 02/24/23   XR LUMBAR SPINE 2/3 VIEWS (Clinic Performed)      Status: None    Narrative    PROCEDURE: XR LUMBAR SPINE 2/3 VIEWS 2/24/2023 2:09 PM    HISTORY: osteoporosis; Age related osteoporosis, unspecified  pathological fracture presence; Acute bilateral thoracic back pain    COMPARISONS: None.    TECHNIQUE: AP and lateral    FINDINGS: There is decrease in height in the L3-L4, L4-L5, and L5-S1  discs. There are lower lumbar facet joint degenerative changes. Sacrum  and sacroiliac joints appear normal. Atherosclerotic plaquing is seen  in the abdominal aorta.         Impression    IMPRESSION: Degenerative changes at L3-L4, L4-L5, and L5-S1    HUGO AKBAR MD         SYSTEM ID:  [id number]   Results for orders placed or performed in visit on 02/24/23   XR Thoracic Spine 2 Views (Clinic Performed)     Status: None    Narrative    PROCEDURE: XR THORACIC SPINE 2 VIEWS 2/24/2023 2:09 PM    HISTORY: Acute bilateral thoracic back pain    COMPARISONS: 2018    TECHNIQUE: 2 views    FINDINGS: There is mild thoracic scoliosis concave left. There is some  mild wedging of several midthoracic vertebral bodies which is stable  from 2018. There is loss of vertical disc space height noted in the  midthoracic spine. The paravertebral soft tissues appear normal.         Impression    IMPRESSION: No change in the thoracic spine from previous examination  2018    HUGO AKBAR MD         SYSTEM ID:  [id number]

## 2023-02-27 ENCOUNTER — OFFICE VISIT (OUTPATIENT)
Dept: CHIROPRACTIC MEDICINE | Facility: OTHER | Age: 83
End: 2023-02-27
Attending: CHIROPRACTOR
Payer: MEDICARE

## 2023-02-27 DIAGNOSIS — M54.2 CERVICALGIA: ICD-10-CM

## 2023-02-27 DIAGNOSIS — M99.02 SEGMENTAL AND SOMATIC DYSFUNCTION OF THORACIC REGION: Primary | ICD-10-CM

## 2023-02-27 DIAGNOSIS — M99.03 SEGMENTAL AND SOMATIC DYSFUNCTION OF LUMBAR REGION: ICD-10-CM

## 2023-02-27 DIAGNOSIS — M99.01 SEGMENTAL AND SOMATIC DYSFUNCTION OF CERVICAL REGION: ICD-10-CM

## 2023-02-27 PROCEDURE — 98941 CHIROPRACT MANJ 3-4 REGIONS: CPT | Mod: AT | Performed by: CHIROPRACTOR

## 2023-02-27 NOTE — PROGRESS NOTES
Subjective Finding:    Chief compalint: Patient presents with:  Back Pain: With neck pain.  Pain in back. X rays with MD reveal DDD of spine .  No sign manip will be done because of this    , Pain Scale: 4/10, Intensity: dull and ache, Duration: 2 months, Radiating: no.    Date of injury:     Activities that the pain restricts:   Home/household/hobbies/social activities: Yes.  Work duties: Yes.  Sleep: Yes.  Makes symptoms better: rest.  Makes symptoms worse: activity.  Have you seen anyone else for the symptoms? Yes: MD.  Work related: No.  Automobile related injury: No.    Objective and Assessment:    Posture Analysis:   High shoulder: .  Head tilt: .  High iliac crest: .  Head carriage: forward.  Thoracic Kyphosis: neutral.  Lumbar Lordosis: neutral.    Lumbar Range of Motion: .  Cervical Range of Motion: extension decreased.  Thoracic Range of Motion: extension decreased.  Extremity Range of Motion: .    Palpation:   T paraspinals: sharp pain, no    Segmental dysfunction pre-treatment and treatment area: C6, T3, T5 and T6.  L2  Assessment post-treatment:  Cervical: ROM increased.  Thoracic: ROM increased.  Lumbar: ROM increased.    Comments: .      Complicating Factors: .    Procedure(s):  CMT:  63381 Chiropractic manipulative treatment 3-4 regions performed   Cervical: Diversified, See above for level, Supine, Thoracic: Diversified, See above for level, Prone and Lumbar: Diversified, See above for level, Side posture    Modalities:  None performed this visit    Therapeutic procedures:  None    Plan:  Treatment plan: 2 times per week for 2 weeks.  Instructed patient: stretch as instructed at visit.  Short term goals: increase ROM.  Long term goals: increase ADL.  Prognosis: very good.

## 2023-03-08 ENCOUNTER — TRANSFERRED RECORDS (OUTPATIENT)
Dept: HEALTH INFORMATION MANAGEMENT | Facility: CLINIC | Age: 83
End: 2023-03-08

## 2023-03-28 NOTE — PROGRESS NOTES
Assessment & Plan     Gastroesophageal reflux disease, unspecified whether esophagitis present  Stop Nexium and use Pepcid as needed  - famotidine (PEPCID) 40 MG tablet; Take 1 tablet (40 mg) by mouth daily    Hyperlipidemia LDL goal <100  Refilled Crestor  - rosuvastatin (CRESTOR) 20 MG tablet; Take 1 tablet (20 mg) by mouth daily    Obesity without serious comorbidity, unspecified classification, unspecified obesity type  We will continue the Rybelsus at 7 mg and when she is done with her weight loss she can go to 7 mg every other day  - Semaglutide (RYBELSUS) 7 MG tablet; Take 1 tablet (7 mg) by mouth daily    Iron deficiency  Iron is better we will have her continue it 3 times weekly  - Iron and iron binding capacity; Future  - Ferritin; Future  - Iron and iron binding capacity  - Ferritin  - ferrous sulfate (FEROSUL) 325 (65 Fe) MG tablet; Take 1 tablet (325 mg) by mouth three times a week    Elevated glucose  She would like to have her A1c rechecked to see from her 12 pound weight loss   - Hemoglobin A1c; Future  - Hemoglobin A1c    High serum magnesium    - Magnesium; Future  - Magnesium    Provider  Link to [facility] Help Grid :[id number]}    Patient was agreeable to this plan and had no further questions.  There are no Patient Instructions on file for this visit.    No follow-ups on file.    Baylee Rodríguez MD  [facility] - ANDREI Simmons is a 82 year old, presenting for the following health issues:  Hypertension  No flowsheet data found.  HPI     ** has recently been diagnosed with dementia so it a little more stressful than usual**    **She has questions about her Crestor and her A1C and see what her cholesterol is at**     Hypertension Follow-up      Do you check your blood pressure regularly outside of the clinic? Yes     Are you following a low salt diet? Yes    Are your blood pressures ever more than 140 on the top number (systolic) OR more   than 90 on the bottom  number (diastolic), for example 140/90? No     Hyperlipidemia Follow-Up      Are you regularly taking any medication or supplement to lower your cholesterol?   No - Rosuvastatin (Ran out so she has not taken for a few days) Will start taking when she gets a new prescription.    Are you having muscle aches or other side effects that you think could be caused by your cholesterol lowering medication?  Yes- Acid Reflux       How many servings of fruits and vegetables do you eat daily?  2-3    On average, how many sweetened beverages do you drink each day (Examples: soda, juice, sweet tea, etc.  Do NOT count diet or artificially sweetened beverages)?   0    How many days per week do you exercise enough to make your heart beat faster? 3 or less    How many minutes a day do you exercise enough to make your heart beat faster? 30 - 60    How many days per week do you miss taking your medication? 0        Concern - weight follow up   Onset: January 2020 (When covid started)  Description: Gaining weight   Intensity: N/A  Progression of Symptoms:  improving  Accompanying Signs & Symptoms: N/A  Previous history of similar problem: N/A  Precipitating factors:        Worsened by: N/A  Alleviating factors:        Improved by: Exercise and healthy eating    Therapies tried and outcome: Walking and healthy eating         Review of Systems   Constitutional, HEENT, cardiovascular, pulmonary, gi and gu systems are negative, except as otherwise noted.      Objective    /68 (BP Location: Right arm, Patient Position: Sitting, Cuff Size: Adult Regular)   Pulse 91   Temp (!) 96.7  F (35.9  C) (Tympanic)   Wt 73 kg (161 lb)   SpO2 95%   BMI 28.52 kg/m    Body mass index is 28.52 kg/m .  Physical Exam   GENERAL: healthy, alert and no distress  NECK: no adenopathy, no asymmetry, masses, or scars and thyroid normal to palpation  RESP: lungs clear to auscultation - no rales, rhonchi or wheezes  CV: regular rate and rhythm, normal S1  S2, no S3 or S4, no murmur, click or rub, no peripheral edema and peripheral pulses strong  ABDOMEN: soft, nontender, no hepatosplenomegaly, no masses and bowel sounds normal  MS: no gross musculoskeletal defects noted, no edema  PSYCH: mentation appears normal, affect normal/bright    Results for orders placed or performed in visit on 03/29/23   Iron and iron binding capacity     Status: Normal   Result Value Ref Range    Iron 68 37 - 145 ug/dL    Iron Binding Capacity 306 240 - 430 ug/dL    Iron Sat Index 22 15 - 46 %   Ferritin     Status: Normal   Result Value Ref Range    Ferritin 95 11 - 328 ng/mL   Hemoglobin A1c     Status: Abnormal   Result Value Ref Range    Estimated Average Glucose 123 mg/dL    Hemoglobin A1C 5.9 (H) <5.7 %   Magnesium     Status: Abnormal   Result Value Ref Range    Magnesium 2.6 (H) 1.7 - 2.3 mg/dL

## 2023-03-29 ENCOUNTER — OFFICE VISIT (OUTPATIENT)
Dept: FAMILY MEDICINE | Facility: OTHER | Age: 83
End: 2023-03-29
Attending: FAMILY MEDICINE
Payer: MEDICARE

## 2023-03-29 VITALS
WEIGHT: 161 LBS | BODY MASS INDEX: 28.52 KG/M2 | OXYGEN SATURATION: 95 % | DIASTOLIC BLOOD PRESSURE: 68 MMHG | SYSTOLIC BLOOD PRESSURE: 123 MMHG | HEART RATE: 91 BPM | TEMPERATURE: 96.7 F

## 2023-03-29 DIAGNOSIS — E66.9 OBESITY WITHOUT SERIOUS COMORBIDITY, UNSPECIFIED CLASSIFICATION, UNSPECIFIED OBESITY TYPE: ICD-10-CM

## 2023-03-29 DIAGNOSIS — E78.5 HYPERLIPIDEMIA LDL GOAL <100: ICD-10-CM

## 2023-03-29 DIAGNOSIS — K21.9 GASTROESOPHAGEAL REFLUX DISEASE, UNSPECIFIED WHETHER ESOPHAGITIS PRESENT: Primary | ICD-10-CM

## 2023-03-29 DIAGNOSIS — R79.89 HIGH SERUM MAGNESIUM: ICD-10-CM

## 2023-03-29 DIAGNOSIS — E61.1 IRON DEFICIENCY: ICD-10-CM

## 2023-03-29 DIAGNOSIS — R73.09 ELEVATED GLUCOSE: ICD-10-CM

## 2023-03-29 LAB
EST. AVERAGE GLUCOSE BLD GHB EST-MCNC: 123 MG/DL
FERRITIN SERPL-MCNC: 95 NG/ML (ref 11–328)
HBA1C MFR BLD: 5.9 %
IRON BINDING CAPACITY (ROCHE): 306 UG/DL (ref 240–430)
IRON SATN MFR SERPL: 22 % (ref 15–46)
IRON SERPL-MCNC: 68 UG/DL (ref 37–145)
MAGNESIUM SERPL-MCNC: 2.6 MG/DL (ref 1.7–2.3)

## 2023-03-29 PROCEDURE — 83735 ASSAY OF MAGNESIUM: CPT | Mod: ZL | Performed by: FAMILY MEDICINE

## 2023-03-29 PROCEDURE — 83036 HEMOGLOBIN GLYCOSYLATED A1C: CPT | Mod: ZL | Performed by: FAMILY MEDICINE

## 2023-03-29 PROCEDURE — 36415 COLL VENOUS BLD VENIPUNCTURE: CPT | Mod: ZL | Performed by: FAMILY MEDICINE

## 2023-03-29 PROCEDURE — 82728 ASSAY OF FERRITIN: CPT | Mod: ZL | Performed by: FAMILY MEDICINE

## 2023-03-29 PROCEDURE — 83550 IRON BINDING TEST: CPT | Mod: ZL | Performed by: FAMILY MEDICINE

## 2023-03-29 PROCEDURE — G0463 HOSPITAL OUTPT CLINIC VISIT: HCPCS

## 2023-03-29 PROCEDURE — 99214 OFFICE O/P EST MOD 30 MIN: CPT | Performed by: FAMILY MEDICINE

## 2023-03-29 RX ORDER — ROSUVASTATIN CALCIUM 20 MG/1
20 TABLET, COATED ORAL DAILY
Qty: 90 TABLET | Refills: 3 | Status: SHIPPED | OUTPATIENT
Start: 2023-03-29 | End: 2023-04-06

## 2023-03-29 RX ORDER — FERROUS SULFATE 325(65) MG
325 TABLET ORAL
Qty: 90 TABLET | Refills: 1 | Status: ON HOLD | OUTPATIENT
Start: 2023-03-29 | End: 2023-06-23

## 2023-03-29 RX ORDER — ORAL SEMAGLUTIDE 7 MG/1
7 TABLET ORAL DAILY
Qty: 90 TABLET | Refills: 2 | Status: SHIPPED | OUTPATIENT
Start: 2023-03-29 | End: 2023-04-06

## 2023-03-29 RX ORDER — FAMOTIDINE 40 MG/1
40 TABLET, FILM COATED ORAL DAILY
Qty: 90 TABLET | Refills: 3 | Status: SHIPPED | OUTPATIENT
Start: 2023-03-29 | End: 2023-04-06

## 2023-03-29 ASSESSMENT — PAIN SCALES - GENERAL: PAINLEVEL: NO PAIN (0)

## 2023-03-29 NOTE — RESULT ENCOUNTER NOTE
Please call patient with the following results:  Iron slight better, keep on iron 3x/wk  Magnesium is ok, I think its from her stomach issues from the rybelsus a few weeks ago

## 2023-03-31 ENCOUNTER — TELEPHONE (OUTPATIENT)
Dept: FAMILY MEDICINE | Facility: OTHER | Age: 83
End: 2023-03-31

## 2023-03-31 NOTE — TELEPHONE ENCOUNTER
4:29 PM    Reason for Call: Phone Call    Description: Patient returning Dr Rodríguez nurse she has been on hold with nurse triage for over a hour she can be reached at [phone]

## 2023-04-06 DIAGNOSIS — E66.9 OBESITY WITHOUT SERIOUS COMORBIDITY, UNSPECIFIED CLASSIFICATION, UNSPECIFIED OBESITY TYPE: ICD-10-CM

## 2023-04-06 DIAGNOSIS — E78.5 HYPERLIPIDEMIA LDL GOAL <100: ICD-10-CM

## 2023-04-06 DIAGNOSIS — K21.9 GASTROESOPHAGEAL REFLUX DISEASE, UNSPECIFIED WHETHER ESOPHAGITIS PRESENT: ICD-10-CM

## 2023-04-06 RX ORDER — FAMOTIDINE 40 MG/1
40 TABLET, FILM COATED ORAL DAILY
Qty: 90 TABLET | Refills: 3 | Status: SHIPPED | OUTPATIENT
Start: 2023-04-06 | End: 2024-04-11

## 2023-04-06 RX ORDER — ORAL SEMAGLUTIDE 7 MG/1
7 TABLET ORAL DAILY
Qty: 90 TABLET | Refills: 2 | Status: SHIPPED | OUTPATIENT
Start: 2023-04-06 | End: 2024-04-11

## 2023-04-06 RX ORDER — ROSUVASTATIN CALCIUM 20 MG/1
20 TABLET, COATED ORAL DAILY
Qty: 90 TABLET | Refills: 3 | Status: SHIPPED | OUTPATIENT
Start: 2023-04-06 | End: 2024-04-11

## 2023-05-16 ENCOUNTER — APPOINTMENT (OUTPATIENT)
Dept: CT IMAGING | Facility: HOSPITAL | Age: 83
End: 2023-05-16
Attending: PHYSICIAN ASSISTANT
Payer: MEDICARE

## 2023-05-16 ENCOUNTER — HOSPITAL ENCOUNTER (EMERGENCY)
Facility: HOSPITAL | Age: 83
Discharge: HOME | End: 2023-05-17
Attending: PHYSICIAN ASSISTANT | Admitting: PHYSICIAN ASSISTANT
Payer: MEDICARE

## 2023-05-16 DIAGNOSIS — N39.0 UTI (URINARY TRACT INFECTION): ICD-10-CM

## 2023-05-16 DIAGNOSIS — R41.0 EPISODIC CONFUSION: Primary | ICD-10-CM

## 2023-05-16 LAB
ALBUMIN UR-MCNC: NEGATIVE MG/DL
ANION GAP SERPL CALCULATED.3IONS-SCNC: 8 MMOL/L (ref 7–15)
APPEARANCE UR: CLEAR
BACTERIA #/AREA URNS HPF: ABNORMAL /HPF
BASOPHILS # BLD AUTO: 0.1 10E3/UL (ref 0–0.2)
BASOPHILS NFR BLD AUTO: 1 %
BILIRUB UR QL STRIP: NEGATIVE
BUN SERPL-MCNC: 12.5 MG/DL (ref 8–23)
CALCIUM SERPL-MCNC: 10 MG/DL (ref 8.8–10.2)
CHLORIDE SERPL-SCNC: 101 MMOL/L (ref 98–107)
COLOR UR AUTO: YELLOW
CREAT SERPL-MCNC: 0.86 MG/DL (ref 0.51–0.95)
DEPRECATED HCO3 PLAS-SCNC: 29 MMOL/L (ref 22–29)
EOSINOPHIL # BLD AUTO: 0.1 10E3/UL (ref 0–0.7)
EOSINOPHIL NFR BLD AUTO: 1 %
ERYTHROCYTE [DISTWIDTH] IN BLOOD BY AUTOMATED COUNT: 13.4 % (ref 10–15)
GFR SERPL CREATININE-BSD FRML MDRD: 66 ML/MIN/1.73M2
GLUCOSE SERPL-MCNC: 116 MG/DL (ref 70–99)
GLUCOSE UR STRIP-MCNC: NEGATIVE MG/DL
HCT VFR BLD AUTO: 42.8 % (ref 35–47)
HGB BLD-MCNC: 13.6 G/DL (ref 11.7–15.7)
HGB UR QL STRIP: NEGATIVE
IMM GRANULOCYTES # BLD: 0 10E3/UL
IMM GRANULOCYTES NFR BLD: 0 %
KETONES UR STRIP-MCNC: NEGATIVE MG/DL
LEUKOCYTE ESTERASE UR QL STRIP: ABNORMAL
LYMPHOCYTES # BLD AUTO: 1.8 10E3/UL (ref 0.8–5.3)
LYMPHOCYTES NFR BLD AUTO: 24 %
MCH RBC QN AUTO: 29.1 PG (ref 26.5–33)
MCHC RBC AUTO-ENTMCNC: 31.8 G/DL (ref 31.5–36.5)
MCV RBC AUTO: 92 FL (ref 78–100)
MONOCYTES # BLD AUTO: 0.6 10E3/UL (ref 0–1.3)
MONOCYTES NFR BLD AUTO: 8 %
MUCOUS THREADS #/AREA URNS LPF: PRESENT /LPF
NEUTROPHILS # BLD AUTO: 4.9 10E3/UL (ref 1.6–8.3)
NEUTROPHILS NFR BLD AUTO: 66 %
NITRATE UR QL: NEGATIVE
NRBC # BLD AUTO: 0 10E3/UL
NRBC BLD AUTO-RTO: 0 /100
PH UR STRIP: 5.5 [PH] (ref 4.7–8)
PLATELET # BLD AUTO: 399 10E3/UL (ref 150–450)
POTASSIUM SERPL-SCNC: 4.2 MMOL/L (ref 3.4–5.3)
RBC # BLD AUTO: 4.67 10E6/UL (ref 3.8–5.2)
RBC URINE: 1 /HPF
SODIUM SERPL-SCNC: 138 MMOL/L (ref 136–145)
SP GR UR STRIP: 1.03 (ref 1–1.03)
SQUAMOUS EPITHELIAL: 0 /HPF
UROBILINOGEN UR STRIP-MCNC: NORMAL MG/DL
WBC # BLD AUTO: 7.4 10E3/UL (ref 4–11)
WBC URINE: 8 /HPF

## 2023-05-16 PROCEDURE — 85025 COMPLETE CBC W/AUTO DIFF WBC: CPT | Performed by: EMERGENCY MEDICINE

## 2023-05-16 PROCEDURE — 85025 COMPLETE CBC W/AUTO DIFF WBC: CPT | Performed by: PHYSICIAN ASSISTANT

## 2023-05-16 PROCEDURE — 93005 ELECTROCARDIOGRAM TRACING: CPT

## 2023-05-16 PROCEDURE — 82310 ASSAY OF CALCIUM: CPT | Performed by: EMERGENCY MEDICINE

## 2023-05-16 PROCEDURE — 87086 URINE CULTURE/COLONY COUNT: CPT | Performed by: PHYSICIAN ASSISTANT

## 2023-05-16 PROCEDURE — 99285 EMERGENCY DEPT VISIT HI MDM: CPT | Mod: 25

## 2023-05-16 PROCEDURE — 250N000013 HC RX MED GY IP 250 OP 250 PS 637: Performed by: PHYSICIAN ASSISTANT

## 2023-05-16 PROCEDURE — 99284 EMERGENCY DEPT VISIT MOD MDM: CPT | Performed by: PHYSICIAN ASSISTANT

## 2023-05-16 PROCEDURE — 70450 CT HEAD/BRAIN W/O DYE: CPT | Mod: MA

## 2023-05-16 PROCEDURE — 81003 URINALYSIS AUTO W/O SCOPE: CPT | Performed by: PHYSICIAN ASSISTANT

## 2023-05-16 PROCEDURE — 93010 ELECTROCARDIOGRAM REPORT: CPT | Performed by: INTERNAL MEDICINE

## 2023-05-16 PROCEDURE — 80048 BASIC METABOLIC PNL TOTAL CA: CPT | Performed by: PHYSICIAN ASSISTANT

## 2023-05-16 PROCEDURE — 36415 COLL VENOUS BLD VENIPUNCTURE: CPT | Performed by: EMERGENCY MEDICINE

## 2023-05-16 RX ORDER — CEPHALEXIN 500 MG/1
500 CAPSULE ORAL ONCE
Status: COMPLETED | OUTPATIENT
Start: 2023-05-16 | End: 2023-05-16

## 2023-05-16 RX ORDER — CEPHALEXIN 500 MG/1
500 CAPSULE ORAL 3 TIMES DAILY
Qty: 21 CAPSULE | Refills: 0 | Status: SHIPPED | OUTPATIENT
Start: 2023-05-16 | End: 2023-05-23

## 2023-05-16 RX ORDER — ASPIRIN 81 MG/1
81 TABLET, CHEWABLE ORAL ONCE
Status: COMPLETED | OUTPATIENT
Start: 2023-05-17 | End: 2023-05-17

## 2023-05-16 RX ADMIN — CEPHALEXIN 500 MG: 500 CAPSULE ORAL at 23:02

## 2023-05-16 ASSESSMENT — ENCOUNTER SYMPTOMS
ACTIVITY CHANGE: 0
CHILLS: 0
NECK STIFFNESS: 0
FEVER: 0
ABDOMINAL PAIN: 0
NAUSEA: 0
BACK PAIN: 0
RESPIRATORY NEGATIVE: 1
VOMITING: 0
APPETITE CHANGE: 0
NECK PAIN: 0

## 2023-05-16 ASSESSMENT — ACTIVITIES OF DAILY LIVING (ADL): ADLS_ACUITY_SCORE: 35

## 2023-05-16 NOTE — ED TRIAGE NOTES
"Reports 30 min prior to arrival patient had an episode where she felt dizzy for a moment. Was getting a glass of milk and \"stopped walking and didn't say anything and then said 'It feels like something just kicked me'\". Patient does not recall saying this. Symptoms only lasted a few seconds per son and patient. Son was present at the time. Patient did not fall, have any vision problems or LOC. Patient states this has happened many times before, every few months. BEFAST negative     Triage Assessment     Row Name 05/16/23 0613       Triage Assessment (Adult)    Airway WDL WDL              "

## 2023-05-17 ENCOUNTER — HOSPITAL ENCOUNTER (OUTPATIENT)
Dept: ULTRASOUND IMAGING | Facility: HOSPITAL | Age: 83
Discharge: HOME | End: 2023-05-17
Attending: PHYSICIAN ASSISTANT | Admitting: PHYSICIAN ASSISTANT
Payer: MEDICARE

## 2023-05-17 VITALS
TEMPERATURE: 98.6 F | HEART RATE: 62 BPM | RESPIRATION RATE: 16 BRPM | OXYGEN SATURATION: 97 % | DIASTOLIC BLOOD PRESSURE: 86 MMHG | SYSTOLIC BLOOD PRESSURE: 185 MMHG

## 2023-05-17 DIAGNOSIS — R41.0 EPISODIC CONFUSION: ICD-10-CM

## 2023-05-17 LAB
HOLD SPECIMEN: NORMAL

## 2023-05-17 PROCEDURE — 93880 EXTRACRANIAL BILAT STUDY: CPT

## 2023-05-17 PROCEDURE — 250N000013 HC RX MED GY IP 250 OP 250 PS 637: Performed by: PHYSICIAN ASSISTANT

## 2023-05-17 RX ADMIN — ASPIRIN 81 MG: 81 TABLET, CHEWABLE ORAL at 00:10

## 2023-05-17 NOTE — ED NOTES
Patient and son provided written and verbal discharge instructions and both verbalizes understanding. Patient left ambulatory with son.

## 2023-05-17 NOTE — ED NOTES
"Pt ambulatory to ED room 6 with son. Patient and son report an incident today where patient was getting milk for her , when all the sudden \"she stopped in her tracks and was staring into space, and said 'Who kicked me?'\" Patient does not remember stating this, states she has \"episodes where I become just dazed and feel weird. I don't really get dizzy\". Son reports it took patient approx 5 minutes to return to complete normal, and that she just seemed confused during that time. Patient reports most recent episode being a few weeks back, but has been going intermittently the past few months. States this is the first time she has been with someone when these episodes have occurred. Denies any falls, patient denies dizziness when ambulating. BEFAST negative.   "

## 2023-05-17 NOTE — DISCHARGE INSTRUCTIONS
As we discussed, your work-up today showed evidence of a bladder infection but otherwise no concerning findings in your lab work or CT scan of your head.  I am not sure what is causing your symptoms, but further evaluation is likely reasonable.  I have placed orders for an outpatient ultrasound of your carotid arteries and of also sent a message to your primary care physician for possible MRI etc.  Continue taking a baby aspirin at night.  Keflex as prescribed.  Please return to this emergency department for any new or worsening symptoms or other questions or concerns.

## 2023-05-17 NOTE — ED NOTES
Patient and son advised that outpatient MRI was ordered by JACINTO Williamson per patient request.
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
## 2023-05-18 ENCOUNTER — TELEPHONE (OUTPATIENT)
Dept: FAMILY MEDICINE | Facility: OTHER | Age: 83
End: 2023-05-18

## 2023-05-18 LAB — BACTERIA UR CULT: NORMAL

## 2023-05-18 NOTE — TELEPHONE ENCOUNTER
" ER F/U 5/16/23       Dizziness  Chief     Patient is feeling better has an MRI on June 1 scheduled.      Assessments & Plan (with Medical Decision Making)   This is an 83-year-old female who presented to the emergency department ambulatory and asymptomatic for evaluation of a vague episode that has been happening periodically for the last 6 months.  Symptoms last only seconds at a time and do not involve any sort of unilateral weakness or other localizing symptoms.  She reports that she feels \"out of sorts\" and sometimes has difficulty with word finding.  Her symptoms resolve completely within seconds.  She has no visual concerns.  No fevers.  No neck discomfort.  No chest pain.  She does endorse chronic mild palpitations.  She reports that when her episode happened today she took a little longer to recover.  Her physical examination was entirely unremarkable.  She was entirely asymptomatic.  Laboratory evaluation did show pyuria.  This is likely not the etiology of her symptoms, however she would fit any reasonable indication for treatment.  Noncontrast CT scan of the head was negative.  As stated above, these symptoms have been ongoing and sound to be multiple over the last at least 6 months.  At this time does not appear to be a reasonable indication for admission.  The patient voiced complete understanding was happy and agreeable.  She will be started on a baby aspirin until she can follow-up with her primary care physician.  Outpatient carotid ultrasound was ordered.  Message sent to primary care physician regarding the patient's visit.       "

## 2023-05-23 NOTE — PROGRESS NOTES
"Assessment & Plan     Episodic confusion  MRI is scheduled for next week we will add on an MRA to rule out aneurysm and Holter monitor to look for atrial fibrillation  - MRA Brain ([place] wo Contrast; Future  - TSH with free T4 reflex; Future  - Leadless EKG Monitor 3 to 7 Days; Future  - Adult Neurology  Referral  - TSH with free T4 reflex    Benign essential hypertension  Because of possible ischemic event we will start low-dose of metoprolol again  - metoprolol succinate ER (TOPROL XL) 25 MG 24 hr tablet; Take 1 tablet (25 mg) by mouth daily    Syncope and collapse  As above  - MRA Brain ([place]) wo Contrast; Future    High magnesium levels  Normal  - Magnesium; Future  - Magnesium    Thyroid nodule  Needs thyroid ultrasound TSH today is normal  - US Thyroid; Future  - TSH with free T4 reflex; Future  - TSH with free T4 reflex    TIA (transient ischemic attack)  As above  - Leadless EKG Monitor 3 to 7 Days; Future  - Adult Neurology  Referral    Vision changes  Unsure whether this is cataracts or if this is related to these episodes  - Adult Eye  Referral; Future         MED REC REQUIRED  Post Medication Reconciliation Status: discharge medications reconciled and changed, per note/orders  BMI:   Estimated body mass index is 27.63 kg/m  as calculated from the following:    Height as of this encounter: 1.6 m (5' 3\").    Weight as of this encounter: 70.8 kg (156 lb).   Weight management plan: Discussed healthy diet and exercise guidelines    Patient was agreeable to this plan and had no further questions.  There are no Patient Instructions on file for this visit.    No follow-ups on file.    Baylee Rodríguez MD  [facility] - ANDREI Simmons is a 83 year old, presenting for the following health issues:  ER F/U        3/29/2023     1:56 PM   Additional Questions   Roomed by Rizwan Rodríguez   Accompanied by N/A     HPI     ED/UC " "Followup:    Facility:  [facility]   Date of visit: 5/17/2023  Reason for visit: dizziness   Current Status: has had one episode on the 19th since the ER visit, nothing since then. Can be standing and isn't a dizzy feeling but just feels off for a little bit or like she is going to pass out then goes away.     Review of Systems   Constitutional, HEENT, cardiovascular, pulmonary, gi and gu systems are negative, except as otherwise noted.      Objective    BP (!) 144/79 (BP Location: Right arm, Patient Position: Sitting, Cuff Size: Adult Regular)   Pulse 78   Temp 97.4  F (36.3  C) (Tympanic)   Ht 1.6 m (5' 3\")   Wt 70.8 kg (156 lb)   SpO2 98%   BMI 27.63 kg/m    Body mass index is 27.63 kg/m .  Physical Exam   GENERAL: healthy, alert and no distress  NECK: no adenopathy, no asymmetry, masses, or scars and thyroid normal to palpation  RESP: lungs clear to auscultation - no rales, rhonchi or wheezes  CV: regular rate and rhythm, normal S1 S2, no S3 or S4, no murmur, click or rub, no peripheral edema and peripheral pulses strong  ABDOMEN: soft, nontender, no hepatosplenomegaly, no masses and bowel sounds normal  MS: no gross musculoskeletal defects noted, no edema  PSYCH: mentation appears normal, affect normal/bright    Results for orders placed or performed in visit on 05/24/23   Magnesium     Status: Normal   Result Value Ref Range    Magnesium 2.1 1.7 - 2.3 mg/dL   TSH with free T4 reflex     Status: Normal   Result Value Ref Range    TSH 1.33 0.30 - 4.20 uIU/mL                 "

## 2023-05-24 ENCOUNTER — OFFICE VISIT (OUTPATIENT)
Dept: FAMILY MEDICINE | Facility: OTHER | Age: 83
End: 2023-05-24
Attending: FAMILY MEDICINE
Payer: MEDICARE

## 2023-05-24 VITALS
HEART RATE: 78 BPM | TEMPERATURE: 97.4 F | BODY MASS INDEX: 27.64 KG/M2 | DIASTOLIC BLOOD PRESSURE: 79 MMHG | SYSTOLIC BLOOD PRESSURE: 144 MMHG | OXYGEN SATURATION: 98 % | HEIGHT: 63 IN | WEIGHT: 156 LBS

## 2023-05-24 DIAGNOSIS — I10 BENIGN ESSENTIAL HYPERTENSION: ICD-10-CM

## 2023-05-24 DIAGNOSIS — E04.1 THYROID NODULE: ICD-10-CM

## 2023-05-24 DIAGNOSIS — G45.9 TIA (TRANSIENT ISCHEMIC ATTACK): ICD-10-CM

## 2023-05-24 DIAGNOSIS — R41.0 EPISODIC CONFUSION: Primary | ICD-10-CM

## 2023-05-24 DIAGNOSIS — H53.9 VISION CHANGES: ICD-10-CM

## 2023-05-24 DIAGNOSIS — R55 SYNCOPE AND COLLAPSE: ICD-10-CM

## 2023-05-24 DIAGNOSIS — E83.41 HIGH MAGNESIUM LEVELS: ICD-10-CM

## 2023-05-24 LAB
MAGNESIUM SERPL-MCNC: 2.1 MG/DL (ref 1.7–2.3)
TSH SERPL DL<=0.005 MIU/L-ACNC: 1.33 UIU/ML (ref 0.3–4.2)

## 2023-05-24 PROCEDURE — 36415 COLL VENOUS BLD VENIPUNCTURE: CPT | Mod: ZL | Performed by: FAMILY MEDICINE

## 2023-05-24 PROCEDURE — 83735 ASSAY OF MAGNESIUM: CPT | Mod: ZL | Performed by: FAMILY MEDICINE

## 2023-05-24 PROCEDURE — 84443 ASSAY THYROID STIM HORMONE: CPT | Mod: ZL | Performed by: FAMILY MEDICINE

## 2023-05-24 PROCEDURE — 99214 OFFICE O/P EST MOD 30 MIN: CPT | Performed by: FAMILY MEDICINE

## 2023-05-24 PROCEDURE — G0463 HOSPITAL OUTPT CLINIC VISIT: HCPCS

## 2023-05-24 RX ORDER — METOPROLOL SUCCINATE 25 MG/1
25 TABLET, EXTENDED RELEASE ORAL DAILY
Qty: 30 TABLET | Refills: 3 | Status: SHIPPED | OUTPATIENT
Start: 2023-05-24 | End: 2023-06-26

## 2023-05-24 ASSESSMENT — PAIN SCALES - GENERAL: PAINLEVEL: NO PAIN (0)

## 2023-06-01 ENCOUNTER — HOSPITAL ENCOUNTER (OUTPATIENT)
Dept: MRI IMAGING | Facility: HOSPITAL | Age: 83
Discharge: HOME | End: 2023-06-01
Attending: FAMILY MEDICINE
Payer: MEDICARE

## 2023-06-01 ENCOUNTER — HOSPITAL ENCOUNTER (OUTPATIENT)
Dept: MRI IMAGING | Facility: HOSPITAL | Age: 83
Discharge: HOME | End: 2023-06-01
Attending: PHYSICIAN ASSISTANT
Payer: MEDICARE

## 2023-06-01 DIAGNOSIS — R55 SYNCOPE AND COLLAPSE: ICD-10-CM

## 2023-06-01 DIAGNOSIS — R41.0 EPISODIC CONFUSION: ICD-10-CM

## 2023-06-01 PROCEDURE — 70553 MRI BRAIN STEM W/O & W/DYE: CPT | Mod: MG

## 2023-06-01 PROCEDURE — 255N000002 HC RX 255 OP 636: Performed by: RADIOLOGY

## 2023-06-01 PROCEDURE — A9585 GADOBUTROL INJECTION: HCPCS | Performed by: RADIOLOGY

## 2023-06-01 PROCEDURE — G1010 CDSM STANSON: HCPCS

## 2023-06-01 RX ORDER — GADOBUTROL 604.72 MG/ML
7.5 INJECTION INTRAVENOUS ONCE
Status: COMPLETED | OUTPATIENT
Start: 2023-06-01 | End: 2023-06-01

## 2023-06-01 RX ADMIN — GADOBUTROL 7.5 ML: 604.72 INJECTION INTRAVENOUS at 17:55

## 2023-06-13 ENCOUNTER — HOSPITAL ENCOUNTER (OUTPATIENT)
Dept: ULTRASOUND IMAGING | Facility: HOSPITAL | Age: 83
Discharge: HOME | End: 2023-06-13
Attending: FAMILY MEDICINE
Payer: MEDICARE

## 2023-06-13 ENCOUNTER — HOSPITAL ENCOUNTER (OUTPATIENT)
Dept: CARDIOLOGY | Facility: HOSPITAL | Age: 83
Discharge: HOME | End: 2023-06-13
Attending: FAMILY MEDICINE
Payer: MEDICARE

## 2023-06-13 DIAGNOSIS — G45.9 TIA (TRANSIENT ISCHEMIC ATTACK): ICD-10-CM

## 2023-06-13 DIAGNOSIS — R41.0 EPISODIC CONFUSION: ICD-10-CM

## 2023-06-13 DIAGNOSIS — E04.1 THYROID NODULE: ICD-10-CM

## 2023-06-13 PROCEDURE — 76536 US EXAM OF HEAD AND NECK: CPT

## 2023-06-13 PROCEDURE — 93242 EXT ECG>48HR<7D RECORDING: CPT

## 2023-06-13 PROCEDURE — 93244 EXT ECG>48HR<7D REV&INTERPJ: CPT | Performed by: INTERNAL MEDICINE

## 2023-06-13 NOTE — PATIENT INSTRUCTIONS
Zio patch placed on patient in outpatient appointment today. Patient was instructed to wear patch for 7 days. Skin was prepped and patch was placed following [employer] guidelines .     Patient was instructed to push button when symptomatic and fill out diary. Patient was instructed not to submerse in water and no swimming, saunas, or hot tubs. Showers may be taken keeping back towards the water. If the area DOES become wet pat it dry with a cloth. If skin irritation occurs patient was instructed to remove patch and contact [employer].    Patient understands we do not receive results until they mail patch back inside the box. Staff reminded patient of outside billing notice which was explained to patient during the scheduling process of this monitor. Patient also instructed to contact [employer] with any questions [phone].    Patient had no further questions and agreed with plan. Patient was sent home with the box, information pamphlet and booklet to mable any incidents in.

## 2023-06-14 ENCOUNTER — OFFICE VISIT (OUTPATIENT)
Dept: FAMILY MEDICINE | Facility: OTHER | Age: 83
End: 2023-06-14
Attending: FAMILY MEDICINE
Payer: MEDICARE

## 2023-06-14 VITALS
TEMPERATURE: 97.3 F | WEIGHT: 155.2 LBS | HEART RATE: 82 BPM | BODY MASS INDEX: 27.5 KG/M2 | HEIGHT: 63 IN | OXYGEN SATURATION: 98 % | DIASTOLIC BLOOD PRESSURE: 66 MMHG | SYSTOLIC BLOOD PRESSURE: 115 MMHG

## 2023-06-14 DIAGNOSIS — R53.83 OTHER FATIGUE: ICD-10-CM

## 2023-06-14 DIAGNOSIS — E04.1 NODULE OF RIGHT LOBE OF THYROID GLAND: Primary | ICD-10-CM

## 2023-06-14 PROCEDURE — G0463 HOSPITAL OUTPT CLINIC VISIT: HCPCS

## 2023-06-14 PROCEDURE — 99214 OFFICE O/P EST MOD 30 MIN: CPT | Performed by: FAMILY MEDICINE

## 2023-06-14 ASSESSMENT — PAIN SCALES - GENERAL: PAINLEVEL: NO PAIN (0)

## 2023-06-14 NOTE — PROGRESS NOTES
"  Assessment & Plan     Nodule of right lobe of thyroid gland  She is concerned due to the thyroid report saying possible malignancy, reassurance given until we get biopsy  Will also get endocrine referral for mgmt    Other fatigue  Likely second to recent health issues and ongoing caregiver stress/fatigue  Her son is helpful and stops by daily      Provider  Link to [facility] Help Grid :[id number]}    Patient was agreeable to this plan and had no further questions.  There are no Patient Instructions on file for this visit.    No follow-ups on file.    Baylee Rodríguez MD  [facility] - ANDREI Simmons is a 83 year old, presenting for the following health issues:  Hypertension and Results      HPI     RESULTS    Hypertension Follow-up      Do you check your blood pressure regularly outside of the clinic? Yes     Are you following a low salt diet? Yes    Are your blood pressures ever more than 140 on the top number (systolic) OR more   than 90 on the bottom number (diastolic), for example 140/90? No    Review of Systems   Constitutional, HEENT, cardiovascular, pulmonary, gi and gu systems are negative, except as otherwise noted.      Objective    /66 (BP Location: Left arm, Patient Position: Sitting, Cuff Size: Adult Regular)   Pulse 82   Temp 97.3  F (36.3  C) (Tympanic)   Ht 1.6 m (5' 3\")   Wt 70.4 kg (155 lb 3.2 oz)   SpO2 98%   BMI 27.49 kg/m    Body mass index is 27.49 kg/m .  Physical Exam   GENERAL: healthy, alert and no distress  NECK: no adenopathy, no asymmetry, masses, or scars and thyroid normal to palpation  RESP: lungs clear to auscultation - no rales, rhonchi or wheezes  CV: regular rate and rhythm, normal S1 S2, no S3 or S4, no murmur, click or rub, no peripheral edema and peripheral pulses strong  MS: no gross musculoskeletal defects noted, no edema  PSYCH: mentation appears normal and anxious    No results found for any visits on 06/14/23.              "

## 2023-06-15 ENCOUNTER — TRANSFERRED RECORDS (OUTPATIENT)
Dept: HEALTH INFORMATION MANAGEMENT | Facility: CLINIC | Age: 83
End: 2023-06-15

## 2023-06-22 ENCOUNTER — TELEPHONE (OUTPATIENT)
Dept: INTERVENTIONAL RADIOLOGY/VASCULAR | Facility: HOSPITAL | Age: 83
End: 2023-06-22

## 2023-06-22 RX ORDER — DEXTROSE MONOHYDRATE 25 G/50ML
25-50 INJECTION, SOLUTION INTRAVENOUS
Status: CANCELLED | OUTPATIENT
Start: 2023-06-22

## 2023-06-22 RX ORDER — NICOTINE POLACRILEX 4 MG
15-30 LOZENGE BUCCAL
Status: CANCELLED | OUTPATIENT
Start: 2023-06-22

## 2023-06-22 RX ORDER — LIDOCAINE 40 MG/G
CREAM TOPICAL
Status: CANCELLED | OUTPATIENT
Start: 2023-06-22

## 2023-06-22 RX ORDER — LIDOCAINE HYDROCHLORIDE 10 MG/ML
10 INJECTION, SOLUTION EPIDURAL; INFILTRATION; INTRACAUDAL; PERINEURAL ONCE
Status: CANCELLED | OUTPATIENT
Start: 2023-06-22 | End: 2023-06-22

## 2023-06-22 RX ORDER — SODIUM CHLORIDE 9 MG/ML
INJECTION, SOLUTION INTRAVENOUS CONTINUOUS PRN
Status: CANCELLED | OUTPATIENT
Start: 2023-06-22

## 2023-06-23 ENCOUNTER — HOSPITAL ENCOUNTER (OUTPATIENT)
Dept: ULTRASOUND IMAGING | Facility: HOSPITAL | Age: 83
Discharge: HOME | End: 2023-06-23
Attending: FAMILY MEDICINE
Payer: MEDICARE

## 2023-06-23 ENCOUNTER — HOSPITAL ENCOUNTER (OUTPATIENT)
Facility: HOSPITAL | Age: 83
Discharge: HOME | End: 2023-06-23
Attending: RADIOLOGY | Admitting: RADIOLOGY
Payer: MEDICARE

## 2023-06-23 VITALS
HEART RATE: 73 BPM | RESPIRATION RATE: 16 BRPM | DIASTOLIC BLOOD PRESSURE: 81 MMHG | OXYGEN SATURATION: 98 % | SYSTOLIC BLOOD PRESSURE: 128 MMHG

## 2023-06-23 DIAGNOSIS — E04.1 NODULE OF RIGHT LOBE OF THYROID GLAND: ICD-10-CM

## 2023-06-23 PROCEDURE — 88172 CYTP DX EVAL FNA 1ST EA SITE: CPT | Mod: 26 | Performed by: PATHOLOGY

## 2023-06-23 PROCEDURE — 88305 TISSUE EXAM BY PATHOLOGIST: CPT | Mod: TC | Performed by: FAMILY MEDICINE

## 2023-06-23 PROCEDURE — 88173 CYTOPATH EVAL FNA REPORT: CPT | Mod: 26 | Performed by: PATHOLOGY

## 2023-06-23 PROCEDURE — 88305 TISSUE EXAM BY PATHOLOGIST: CPT | Mod: 26 | Performed by: PATHOLOGY

## 2023-06-23 PROCEDURE — 250N000009 HC RX 250: Performed by: RADIOLOGY

## 2023-06-23 PROCEDURE — 10005 FNA BX W/US GDN 1ST LES: CPT

## 2023-06-23 RX ORDER — SODIUM CHLORIDE 9 MG/ML
INJECTION, SOLUTION INTRAVENOUS CONTINUOUS PRN
Status: DISCONTINUED | OUTPATIENT
Start: 2023-06-23 | End: 2023-06-24 | Stop reason: HOSPADM

## 2023-06-23 RX ORDER — LIDOCAINE HYDROCHLORIDE 10 MG/ML
10-20 INJECTION, SOLUTION EPIDURAL; INFILTRATION; INTRACAUDAL; PERINEURAL ONCE
Status: COMPLETED | OUTPATIENT
Start: 2023-06-23 | End: 2023-06-23

## 2023-06-23 RX ORDER — NICOTINE POLACRILEX 4 MG
15-30 LOZENGE BUCCAL
Status: DISCONTINUED | OUTPATIENT
Start: 2023-06-23 | End: 2023-06-24 | Stop reason: HOSPADM

## 2023-06-23 RX ORDER — LIDOCAINE 40 MG/G
CREAM TOPICAL
Status: DISCONTINUED | OUTPATIENT
Start: 2023-06-23 | End: 2023-06-24 | Stop reason: HOSPADM

## 2023-06-23 RX ORDER — DEXTROSE MONOHYDRATE 25 G/50ML
25-50 INJECTION, SOLUTION INTRAVENOUS
Status: DISCONTINUED | OUTPATIENT
Start: 2023-06-23 | End: 2023-06-24 | Stop reason: HOSPADM

## 2023-06-23 RX ADMIN — LIDOCAINE HYDROCHLORIDE 3 ML: 10 INJECTION, SOLUTION EPIDURAL; INFILTRATION; INTRACAUDAL; PERINEURAL at 11:54

## 2023-06-23 ASSESSMENT — ACTIVITIES OF DAILY LIVING (ADL): ADLS_ACUITY_SCORE: 35

## 2023-06-23 NOTE — IP AVS SNAPSHOT
[facility]  [address] [place] [address]  Phone: [phone]                              After Visit Summary   6/23/2023    Iris Ellis   MRN: [id number]           After Visit Summary Signature Page    I have received my discharge instructions, and my questions have been answered. I have discussed any challenges I see with this plan with the nurse or doctor.    ..........................................................................................................................................  Patient/Patient Representative Signature      ..........................................................................................................................................  Patient Representative Print Name and Relationship to Patient    ..................................................               ................................................  Date                                   Time    ..........................................................................................................................................  Reviewed by Signature/Title    ...................................................              ..............................................  Date                                               Time    22EPIC Rev 08/18

## 2023-06-23 NOTE — PROGRESS NOTES
Procedure: Fine Needle Biopsy, Thyroid, right    There were  no complications and patient has no symptoms..      Tolerated procedure well.    Patient to US.  Consent complete.  Supine on Ultrasound table.      Radiologist:Dr. Castellon    Time Out: Prior to the start of the procedure and with procedural staff participation, I verbally confirmed the patient s identity using two indicators, relevant allergies, that the procedure was appropriate and matched the consent or emergent situation, and that the correct equipment/implants were available. Immediately prior to starting the procedure I conducted the Time Out with the procedural staff and re-confirmed the patient s name, procedure, and site/side. (The Joint Commission universal protocol was followed.)  Yes    Position:  supine    Pain:  0    Sedation:None. Local Anesthestic used  No sedation    Estimated Blood Loss: Minimal     Condition: Stable   Discussed discharge instructions with pt and AVS with instructions given to pt. Pt verbalized understanding.    Comments: See dictated procedure note for full details     Sapna Valdez RN

## 2023-06-23 NOTE — DISCHARGE INSTRUCTIONS
"    DISCHARGE INSTRUCTIONS  Needle Aspiration      IMPORTANT: As you prepare for discharge, the following information will help you return to your best level of health.               This Information Is About Your Follow Up Care     Call your doctor if you do not get better. Call sooner if you feel worse. You can reach your doctor by calling their clinic phone number.                                    This Information Is About Procedures      NEEDLE ASPIRATION  You have had a needle aspiration.  A needle aspiration (also called a \"fine needle biopsy\") is a procedure used to take a sample of cells or tissues from a lump or mass or other area of concern.  The sample of cells taken during the procedure can then be checked in the lab under a microscope to find out if there is cancer or other diseases.   Sometimes a needle aspiration is done to check the effect of treatment on a known tumor.    When you go home, follow these instructions:  Check the site frequently for bleeding, swelling, redness, or drainage.  Use an ice pack at the site for 20 minutes at a time if needed for pain.  Rest for the remainder of the day.  Don't drive for 24 hours if you received medicine to relax you during the procedure.  Return to your usual diet.  Do not take aspirin or anti-inflammatory medicines like ibuprofen or naproxen (check with your doctor if you take aspirin for heart disease or stroke).  You may take acetaminophen (Tylenol) for pain if needed.  Do not take blood thinner medicines until your doctor tells you to restart them.    Call your doctor if you have:  Continued bleeding  Swelling or a lump at the needle site  Pain not made better by acetaminophen  Fever or chills  Redness or drainage from the needle site  Chest pain or trouble breathing  Any questions or concerns                    "

## 2023-06-23 NOTE — IP AVS SNAPSHOT
After Visit Summary Template Not Found    This Print Group is only intended to be used in the After Visit Summary and can only be used in a report that uses a released After Visit Summary Template.                       MRN:[id number]                      After Visit Summary   6/23/2023    Iris Ellis   MRN: [id number]           Visit Information        Provider Department      6/23/2023 11:30 AM HIX[facility]N; [facility]; [facility]           Review of your medicines       Notice    This visit is during an admission. Changes to the med list made in this visit will be reflected in the After Visit Summary of the admission.           Protect others around you: Learn how to safely use, store and throw away your medicines at www.disposemymeds.org.       Follow-ups after your visit       Your next 10 appointments already scheduled    Jun 23, 2023 11:30 AM  (Arrive by 11:00 AM)  US BIOPSY THYROID FINE NEEDLE ASPIRATION with [facility], HIIRRAD, HIXRRN  [facility] ([facility] ) [address] [place] [id number]  [phone]   How do I prepare for my exam? (Food and drink instructions)  No Food and Drink Restrictions.    How do I prepare for my exam? (Other instructions)  IF YOUR DOCTOR ALSO PRESCRIBED SEDATION DURING THE EXAM (medicine to help you relax): You will receive separate instructions about driving, eating, and additional tests that may be necessary prior to your exam day.    What should I wear: Wear comfortable clothes.    How long does the exam take: Aspiration (no sedation treatment takes about an hour).  For paracentesis, thoracentesis or sedation plan to spend at least three hours at the hospital.    What should I bring: Bring a list of your medicines, including vitamins, minerals and over-the-counter drugs. It is safest to leave personal items at home. Bring your 's license or photo ID and your insurance card. For the safety of your family, please do not bring any  children with you unless they are accompanied by another adult who can watch them during your imaging exam.    Do I need a :  No  is needed.    What do I need to tell my doctor:  Tell your doctor in advance:  * If you are or may be pregnant.  * If you are taking Coumadin (or any other blood thinners) 5 days prior to the exam for any special instructions.  * If you are diabetic to determine if your insulin needs have to be adjusted for the exam.    What should I do after the exam: Take it easy the rest of the day. You can return to normal activities the next day.    What is this test: This test uses a long, thin needle or tube to remove tissue, fluid, or other cells from your body. Pictures from an ultrasound will guide the needle to the right place. (Ultrasound uses sound waves to create pictures of the body on a video screen. You will not feel the sound waves.)    * A biopsy removes tissue or other cells from the body; we send the tissue or cells to a lab for testing.  * Paracentesis removes fluid from the belly (abdomen) to relieve pressure, to test the fluid or both.  * Thoracentesis removes fluid from the sac around the lungs to relieve pressure, to test the fluid or both.  * Aspiration removes fluid from any part of the body, then the fluid is tested for disease or infection.    Who should I call with questions: If you have any questions, please call the Imaging Department where you will have your exam. Directions, parking instructions, and other information are available on our website, [email or web address].     Aug 16, 2023 11:00 AM  (Arrive by 10:45 AM)  Hearing Eval with Nayana Bro  [facility] ([facility] ) [address] [address]  [phone]      Jan 24, 2024  2:15 PM  (Arrive by 2:00 PM)  SHORT with Baylee Rodríguez MD  [facility] ([facility] ) [address]  "[id number]  [phone]         Care Instructions       Further instructions from your care team           DISCHARGE INSTRUCTIONS  Needle Aspiration      IMPORTANT: As you prepare for discharge, the following information will help you return to your best level of health.               This Information Is About Your Follow Up Care     Call your doctor if you do not get better. Call sooner if you feel worse. You can reach your doctor by calling their clinic phone number.                                    This Information Is About Procedures      NEEDLE ASPIRATION  You have had a needle aspiration.  A needle aspiration (also called a \"fine needle biopsy\") is a procedure used to take a sample of cells or tissues from a lump or mass or other area of concern.  The sample of cells taken during the procedure can then be checked in the lab under a microscope to find out if there is cancer or other diseases.   Sometimes a needle aspiration is done to check the effect of treatment on a known tumor.    When you go home, follow these instructions:  Check the site frequently for bleeding, swelling, redness, or drainage.  Use an ice pack at the site for 20 minutes at a time if needed for pain.  Rest for the remainder of the day.  Don't drive for 24 hours if you received medicine to relax you during the procedure.  Return to your usual diet.  Do not take aspirin or anti-inflammatory medicines like ibuprofen or naproxen (check with your doctor if you take aspirin for heart disease or stroke).  You may take acetaminophen (Tylenol) for pain if needed.  Do not take blood thinner medicines until your doctor tells you to restart them.    Call your doctor if you have:  Continued bleeding  Swelling or a lump at the needle site  Pain not made better by acetaminophen  Fever or chills  Redness or drainage from the needle site  Chest pain or trouble breathing  Any questions or concerns                      Additional Information About Your " Visit       [facility] Information    [facility] gives you secure access to your electronic health record. If you see a primary care provider, you can also send messages to your care team and make appointments. If you have questions, please call your primary care clinic.  If you do not have a primary care provider, please call [phone] and they will assist you.       Care EveryWhere ID    This is your Care EveryWhere ID. This could be used by other organizations to access your [facility] medical records  [id number]        Primary Care Provider  Baylee Rodríguez MD Office Phone #  [phone] Fax #  [phone]      Equal Access to Services    [facility] Center[facility] : Hadii aad leonard hadasho Soomaali, waaxda luqadaha, qaybta kaalmada adeegyada, jody mosley . So [facility] [phone].    ATENCIÓN: Si habla español, tiene a ray disposición servicios gratuitos de asistencia lingüística. Llame al [phone].    We comply with applicable federal and state civil rights laws, including the [place] Human Rights Act. We do not discriminate on the basis of race, color, creed, Yarsanism, national origin, marital status, age, disability, sex, sexual orientation, or gender identity.    If you would like an itemization of your charges they will now be available in [facility] 30 days after discharge. To access the itemized statements in [facility] go to billing/billing summary. From there select view account. There will be multiple tabs showing an overview of your account, detail, payments, and communications. From the communications tab you can see your monthly statements, your itemized statements, and any billing letters generated for your account. If you do not have a [facility] account and need help getting access please contact [facility] support at [phone].  If you would prefer to have your itemized statements mailed please contact our automated itemized bill request line at [phone] option  2.       Thank  you!    Thank you for choosing [facility] for your care. Our goal is always to provide you with excellent care. Hearing back from our patients is one way we can continue to improve our services. Please take a few minutes to complete the written survey that you may receive in the mail after you visit with us. Thank you!         Medication List      Notice    This visit is during an admission. Changes to the med list made in this visit will be reflected in the After Visit Summary of the admission.

## 2023-06-26 ENCOUNTER — MYC MEDICAL ADVICE (OUTPATIENT)
Dept: FAMILY MEDICINE | Facility: OTHER | Age: 83
End: 2023-06-26

## 2023-06-26 DIAGNOSIS — I10 BENIGN ESSENTIAL HYPERTENSION: ICD-10-CM

## 2023-06-26 RX ORDER — METOPROLOL SUCCINATE 25 MG/1
25 TABLET, EXTENDED RELEASE ORAL DAILY
Qty: 90 TABLET | Refills: 3 | Status: SHIPPED | OUTPATIENT
Start: 2023-06-26 | End: 2023-08-09 | Stop reason: DRUGHIGH

## 2023-06-26 NOTE — TELEPHONE ENCOUNTER
Per AVS 6.2.2023 thyroid biopsy.      Do not take blood thinner medicines until your doctor tells you to restart them.    She takes baby ASA.    Restart?    Ilda BILLY RN

## 2023-06-27 ENCOUNTER — TELEPHONE (OUTPATIENT)
Dept: INTERVENTIONAL RADIOLOGY/VASCULAR | Facility: HOSPITAL | Age: 83
End: 2023-06-27

## 2023-06-28 LAB
PATH REPORT.COMMENTS IMP SPEC: NORMAL
PATH REPORT.FINAL DX SPEC: NORMAL
PATH REPORT.GROSS SPEC: NORMAL
PATH REPORT.MICROSCOPIC SPEC OTHER STN: NORMAL

## 2023-06-29 ENCOUNTER — MYC MEDICAL ADVICE (OUTPATIENT)
Dept: FAMILY MEDICINE | Facility: OTHER | Age: 83
End: 2023-06-29

## 2023-06-29 DIAGNOSIS — I49.8 VENTRICULAR TRIGEMINY: ICD-10-CM

## 2023-06-29 DIAGNOSIS — I47.10 SVT (SUPRAVENTRICULAR TACHYCARDIA) (H): Primary | ICD-10-CM

## 2023-06-29 DIAGNOSIS — I10 BENIGN ESSENTIAL HYPERTENSION: Primary | ICD-10-CM

## 2023-06-30 NOTE — TELEPHONE ENCOUNTER
Ok for increase in metoprolol, she can double up what she has and does she want new rx sent to [facility]?

## 2023-07-26 RX ORDER — METOPROLOL SUCCINATE 50 MG/1
50 TABLET, EXTENDED RELEASE ORAL DAILY
Qty: 90 TABLET | Refills: 1 | Status: SHIPPED | OUTPATIENT
Start: 2023-07-26 | End: 2024-03-21

## 2023-07-26 NOTE — TELEPHONE ENCOUNTER
Ok to restart rybelsus, does she want to start at 3mg again? Or 7mg?  Metoprolol should slow the fast heartbeat  No she doesn't need anything for the thyroid, we'll recheck another ultrasound in a year.

## 2023-08-08 DIAGNOSIS — H91.93 DECREASED HEARING OF BOTH EARS: Primary | ICD-10-CM

## 2023-08-09 ENCOUNTER — OFFICE VISIT (OUTPATIENT)
Dept: CARDIOLOGY | Facility: OTHER | Age: 83
End: 2023-08-09
Attending: INTERNAL MEDICINE
Payer: MEDICARE

## 2023-08-09 VITALS
BODY MASS INDEX: 27.46 KG/M2 | DIASTOLIC BLOOD PRESSURE: 52 MMHG | RESPIRATION RATE: 15 BRPM | TEMPERATURE: 97.8 F | HEART RATE: 78 BPM | WEIGHT: 155 LBS | OXYGEN SATURATION: 97 % | SYSTOLIC BLOOD PRESSURE: 118 MMHG

## 2023-08-09 DIAGNOSIS — I49.8 VENTRICULAR TRIGEMINY: ICD-10-CM

## 2023-08-09 DIAGNOSIS — R41.0 EPISODIC CONFUSION: ICD-10-CM

## 2023-08-09 DIAGNOSIS — I47.10 PAROXYSMAL SUPRAVENTRICULAR TACHYCARDIA (H): ICD-10-CM

## 2023-08-09 DIAGNOSIS — I49.3 VENTRICULAR ECTOPY: ICD-10-CM

## 2023-08-09 DIAGNOSIS — R40.4 TRANSIENT ALTERATION OF AWARENESS: Primary | ICD-10-CM

## 2023-08-09 DIAGNOSIS — E78.2 MIXED HYPERLIPIDEMIA: ICD-10-CM

## 2023-08-09 DIAGNOSIS — I10 BENIGN ESSENTIAL HYPERTENSION: ICD-10-CM

## 2023-08-09 PROCEDURE — 93005 ELECTROCARDIOGRAM TRACING: CPT | Performed by: INTERNAL MEDICINE

## 2023-08-09 PROCEDURE — 93010 ELECTROCARDIOGRAM REPORT: CPT | Performed by: INTERNAL MEDICINE

## 2023-08-09 PROCEDURE — G0463 HOSPITAL OUTPT CLINIC VISIT: HCPCS | Performed by: INTERNAL MEDICINE

## 2023-08-09 PROCEDURE — 99203 OFFICE O/P NEW LOW 30 MIN: CPT | Performed by: INTERNAL MEDICINE

## 2023-08-09 NOTE — PATIENT INSTRUCTIONS
Thank you for allowing Dr. Gunderson and our  team to participate in your care. Please call our office at [phone] with scheduling questions or if you need to cancel or change your appointment. With any other questions or concerns you may call [facility] cardiology nurse at [phone].       If you experience chest pain, chest pressure, chest tightness, shortness of breath, fainting, lightheadedness, nausea, vomiting, or other concerning symptoms, please report to the Emergency Department or call 911. These symptoms may be emergent, and best treated in the Emergency Department.    Follow up in

## 2023-08-09 NOTE — PROGRESS NOTES
"[facility] HEART CARE   CARDIOLOGY CONSULT     Iris Ellis   1940  [id number]    Baylee Rodríguez     Chief Complaint   Patient presents with    New Patient     SVT          HPI:   Mrs. Ellis is a 83-year-old female who is seen cardiology in follow-up to her Zio patch.  Patient has brief episodes of loss of awareness for approximately a second.  She was seen in the ER on 5/16/2023 for this issue.  She follow-up with her primary who ordered a Zio patch among other testing.  Zio patch showed brief runs of SVT, ventricular trigeminy, SVE, and VE.  She is here in follow-up related to her Zio patch.  She has also been referred to neurology with consult pending.    She was having episodes where she had forgetfulness/confusion lasting a second.  This is happening about once a week.  Has not happened for a while.  She was saying inappropriate things like \"somebody kicked me.\"  She does not remember saying these things.  She has a hard time describing exactly what she was feeling.  She did not lose consciousness.  She is not passing out.  She was not losing control of her body.  It was recommended she go to the ER.  She was seen on 5/16/2023.  She was found have a UTI and treated for that.    She saw her primary on 5/24/2023.  She was set up for an MRI/MRA, ultrasound carotids, and Zio patch.    Zio patch results showed x 24 episodes of SVT lasting up to 15 beats, ventricular trigeminy up to 26.9 seconds, SVE, and VE.  She was asymptomatic when these episodes occurred.  Patient is here as a result.    IMAGING RESULTS:   Zio patch on 6/13/2023:  Worn for 7 days and 10 hr's.  After removing artifact, total time was 6 days and 18 hr's. Placed on 6/13/23 at 2:37 pm and completed on 6/21/23 at 12:31 am.  Underlying rhythm was sinus.  Hrt rate ranged from 49 bpm, maximum heart rate of 190 bmp, averaging 68 bmp.  No significant bradycardia, pauses, Mobitz type II or 3rd degree heart block.  No atrial fibrillation on " this study.  x0 triggered events and x0 diary entries.    x0 runs of VT.    x24 runs of SVT longest lasting 15 beats with a maximum heart rate of 190 bmp.  Rare, <1% of PAC's, atrial couplets, atrial triplets, and PVC's.  No episodes of ventricular bigeminy.  + episodes of ventricular trigeminy lasting up to 26.9 sec's.    US carotids on 5/17/2023:  No evidence of flow-limiting atherosclerotic disease of the neck.       Stress test on 1/30/2017:  1.  No evidence of myocardial ischemia.  2.  Normal left ventricular function.    CURRENT MEDICATIONS:   Prior to Admission medications    Medication Sig Start Date End Date Taking? Authorizing Provider   alendronate (FOSAMAX) 70 MG tablet Take 1 tablet (70 mg) by mouth every 7 days 1/31/23   Baylee Rodríguez MD   Calcium Carbonate-Vit D-Min (CALCIUM 1200 PO) Take  by mouth. Take 1 daily    Reported, Patient   famotidine (PEPCID) 40 MG tablet Take 1 tablet (40 mg) by mouth daily 4/6/23   Baylee Rodríguez MD   Lactobacillus (ACIDOPHILUS PO) 1 daily    Reported, Patient   metoprolol succinate ER (TOPROL XL) 25 MG 24 hr tablet Take 1 tablet (25 mg) by mouth daily 6/26/23   Baylee Rodríguez MD   metoprolol succinate ER (TOPROL XL) 50 MG 24 hr tablet Take 1 tablet (50 mg) by mouth daily 7/26/23   Baylee Rodríguez MD   multivitamin (OCUVITE) TABS tablet Take 1 tablet by mouth daily 4/24/17   Anna Bolaños MD   rosuvastatin (CRESTOR) 20 MG tablet Take 1 tablet (20 mg) by mouth daily 4/6/23   Baylee Rodríguez MD   Semaglutide (RYBELSUS) 7 MG tablet Take 1 tablet (7 mg) by mouth daily 4/6/23   Baylee Rodríguez MD       ALLERGIES:   Allergies   Allergen Reactions    Nabumetone Rash    Acetaminophen Nausea     Lortab    Hydrocodone Bitartrate Nausea     Lortab    Naproxen Nausea     Naprosyn    Rofecoxib      Upsets stomach  Vioxx        PAST MEDICAL HISTORY:   Past Medical History:   Diagnosis Date    Blood glucose elevated     BPPV (benign paroxysmal positional  vertigo)     Cystic disease of ovaries     Essential hypertension 2006    Family history of malignant neoplasm of gastrointestinal tract 2003    colon cancer in mother    Lymphocytic colitis 10/30/2020    Dr Adams, [facility]    Osteoarthritis     Right hip LYNN, Dr Nelson    Osteoporosis     Other abnormal blood chemistry 2012    Paroxysmal supraventricular tachycardia (H) 2023    psoriasis 2011    Pure hypercholesterolemia 1999    Symptomatic menopausal or female climacteric states 2003    Thyroid nodule 2023    Unspecified closed fracture of pelvis 2011    Urethral stricture         PAST SURGICAL HISTORY:   Past Surgical History:   Procedure Laterality Date    APPENDECTOMY  1944    AS REVISE TOTAL HIP REPLACEMENT Left 2018    Dr Whaley    CHOLECYSTECTOMY  1979    colonoscopies  , , , , 2014    Colonic polyps. Repeat colonoscopy     Declines further mammograms  2009    Left superior and inferior pubic rami fractures  2006    ORTHOPEDIC SURGERY Right 2011    total right hip. Dr. Nelson    PACs, PVCs, SVT, bradycardia on Holter Moniter  2009    Dr. Pace    Urethral dilation  2003    Urethral stricture        FAMILY HISTORY:   Family History   Problem Relation Age of Onset    Diabetes Mother 59         age 59 of pulmonar embolism  after amputation of leg due to diabetes    Alzheimer Disease Father 82    Coronary Artery Disease Sister         Heart attack    Colon Cancer Sister 84    Alzheimer Disease Sister     Colon Cancer Brother 69    Lung Cancer Brother     Prostate Cancer Brother     Unknown/Adopted Maternal Grandmother     Unknown/Adopted Maternal Grandfather     Unknown/Adopted Paternal Grandmother     Unknown/Adopted Paternal Grandfather     Cancer Other         Colon-family h/o        SOCIAL HISTORY:   Social History     Socioeconomic History    Marital status:    Occupational  History     Employer: RETIRED   Tobacco Use    Smoking status: Former     Packs/day: 0.00     Types: Cigarettes    Smokeless tobacco: Never    Tobacco comments:     Was only a sort time 1971 to 1973   Vaping Use    Vaping Use: Never used   Substance and Sexual Activity    Alcohol use: Yes     Comment: Wine occasionally    Drug use: No    Sexual activity: Not Currently   Other Topics Concern    Caffeine Concern Yes     Comment: coffee-16 oz daily    Parent/sibling w/ CABG, MI or angioplasty before 65F 55M? Yes     Comment: 76 yr old sister 2018   Social History Narrative     -- n/a    Caffeine -- none    Concussion -- n/a     Social Determinants of Health     Physical Activity: Insufficiently Active (1/23/2023)    Exercise Vital Sign     Days of Exercise per Week: 6 days     Minutes of Exercise per Session: 20 min          ROS:   CONSTITUTIONAL: No weight loss, fever, chills, weakness or fatigue.   CARDIOVASCULAR: No chest pain, chest pressure or chest discomfort. No palpitations or lower extremity edema.   RESPIRATORY: No shortness of breath, dyspnea upon exertion, cough or sputum production.   NEUROLOGICAL: No headache, lightheadedness, dizziness, syncope, ataxia or weakness.   HEMATOLOGIC: No anemia, bleeding or bruising.         PHYSICAL EXAM:   GENERAL: The patient is a well-developed, well-nourished, in no apparent distress. Alert and oriented x3.   HEART: Regular rate and rhythm, S1S2 present without murmur, rub or gallop.   LUNGS: Respirations regular and unlabored. Clear to auscultation.   EXTREMITIES: No peripheral edema present.   SKIN: No jaundice. No rashes or visible skin lesions present.        LAB RESULTS:   No visits with results within 2 Month(s) from this visit.   Latest known visit with results is:   Office Visit on 05/24/2023   Component Date Value Ref Range Status    Magnesium 05/24/2023 2.1  1.7 - 2.3 mg/dL Final    TSH 05/24/2023 1.33  0.30 - 4.20 uIU/mL Final          ASSESSMENT:        ICD-10-CM    1. Transient alteration of awareness  R40.4       2. Paroxysmal supraventricular tachycardia (H)  I47.1       3. Ventricular trigeminy  I49.8       4. Ventricular ectopy  I49.3       5. Episodic confusion  R41.0       6. Benign essential hypertension  I10       7. Mixed hyperlipidemia  E78.2             PLAN:   1.  PSVT: Short runs at 15 beats.  Asymptomatic.  Could start a beta-blocker/calcium channel blocker.  However, patient is asymptomatic.  Because she is asymptomatic, no treatment indicated at this point.  2.  SVT, VE, ventricular trigeminy: Again, asymptomatic.  Could treat with calcium channel blocker/beta-blocker.  Does not have enough episodes require ablation.  No plans for medical management as patient is symptomatic.  3.  Follow-up in the future on as-needed basis.    Total time spent on day of visit, including review of tests, obtaining/reviewing separately obtained history, ordering medications/tests/procedures, communicating with PCP/consultants, and documenting in electronic medical record: 30 minutes.        Thank you for allowing me to participate in the care of your patient. Please do not hesitate to contact me if you have any questions.     Dylan Gunderson, DO

## 2023-08-16 ENCOUNTER — OFFICE VISIT (OUTPATIENT)
Dept: AUDIOLOGY | Facility: OTHER | Age: 83
End: 2023-08-16
Attending: AUDIOLOGIST
Payer: MEDICARE

## 2023-08-16 ENCOUNTER — ALLIED HEALTH/NURSE VISIT (OUTPATIENT)
Dept: AUDIOLOGY | Facility: OTHER | Age: 83
End: 2023-08-16
Attending: AUDIOLOGIST
Payer: MEDICARE

## 2023-08-16 DIAGNOSIS — H91.93 DECREASED HEARING OF BOTH EARS: Primary | ICD-10-CM

## 2023-08-16 DIAGNOSIS — H90.3 SENSORINEURAL HEARING LOSS (SNHL) OF BOTH EARS: Primary | ICD-10-CM

## 2023-08-16 DIAGNOSIS — H91.93 DECREASED HEARING OF BOTH EARS: ICD-10-CM

## 2023-08-16 PROCEDURE — V5299 HEARING SERVICE: HCPCS

## 2023-08-16 NOTE — PROGRESS NOTES
Audiology Evaluation Completed. Please refer SCANNED AUDIOGRAM and/or TYMPANOGRAM for BACKGROUND, RESULTS, RECOMMENDATIONS.      Haley ANAYA, [facility]  Audiologist #[id number]

## 2023-08-16 NOTE — PROGRESS NOTES
HEARING AID CHECK    BACKGROUND:  Iris Ellis, a 83 year old female, was seen today for an hearing aid check.  Ms. Ellis wears Binaural Unitron Moxi2 16 hearing aids.  Ms. Ellis reported no concerns.    FINDINGS:  Visual inspection and cleaning provided.   C-stop changed with new. Small open domes changed with new. Battery was tested and good. Good listening check.    Reviewed cleaning, troubleshooting, and preventative care with patient. Any cleaning tools used were provided as customer courtesy.    Services performed and warranty reviewed with patient.    PLAN:  Based on patient report, no audiology appointment for adjustments needed.Ms. Ellis will return to clinic in 12 months, sooner if needed. Patient had no further questions and reports satisfaction.         Chiqui Harrell  Audiology Assistant  [facility]  [phone]

## 2023-11-08 ENCOUNTER — OFFICE VISIT (OUTPATIENT)
Dept: AUDIOLOGY | Facility: OTHER | Age: 83
End: 2023-11-08
Attending: AUDIOLOGIST
Payer: MEDICARE

## 2023-11-08 DIAGNOSIS — H90.3 SENSORINEURAL HEARING LOSS (SNHL) OF BOTH EARS: Primary | ICD-10-CM

## 2023-11-08 PROCEDURE — 92591 HC HEARING AID EXAM BINAURAL: CPT | Mod: GA | Performed by: AUDIOLOGIST

## 2023-11-08 NOTE — PROGRESS NOTES
BACKGROUND:  Iris was seen today for consult regarding hearing aids. The patient notes difficulty with communication in a variety of listening situations and would like to explore possible benefit obtained via use of amplification.      Patient has signed waiver to medical evaluation form for hearing aid use. Iris is a binaural hearing aid candidate and will receive a Hearing Aid Recommendation today.    RESULTS:  Audiology Assistant reviewed below information:    Estimated insurance coverage for hearing aids reviewed.  [place] Department of Health form titled 'Legal rights and consumer information about purchasing a hearing instrument verbally reviewed and given to patient. Trial Period, cancellation fee, warranties highlighted. Patient risk factors have been provided to the patient in writing prior to the sale of the hearing aid per FDA regulation. The risk factors are also available in the User Instructional Booklet to be presented on the day of the hearing aid fitting.  ABN (Advanced Beneficiary Notice of Non-Coverage) completed and copy given to patient.       Hearing aid recommendation provided by Audiologist.    Thru use of hearing aids patient would like to improve ability to hear:  where there are groups and noise.   to hear the television at a lower volume to enjoy this activity with other people.   Iris also reports trouble hearing in the car, at home, and on the telephone if there is not a volume control.      Discussed hearing aid styles, technology, features, and options at length with Iris.  Sample devices were shown. Pros/Cons of options reviewed.  Expectations for amplification discussed. .Also discussed the importance of binaural amplification for hearing speech in the presence of background noise and localizing the directions of sounds.  Wireless options were also discussed at length and sample devices were shown.       Hearing Aid Recommendations: Hearing Aid Recommendation reviewed  with patient. Pt chose to proceed with order and Purchase Agreement completed. Copies provided to patient.      Hearing Aids:  Binaural Oticon Real 3 miniRITE T  Color: beige  Battery Size: rechargeable  Earmold/Tips: 2-85 6mm DV      RECOMMENDATIONS:  The 45 day trial period was explained to patient, and they expressed understanding. Ms. Ellis signed the Hearing Aid Purchase Agreement and was given a copy, as well as details on her hearing aids. Patient was counseled that exact out of pocket amounts cannot be determined for hearing aid claims being sent to insurance. Any insurance coverage information presented to the patient is an estimate only, and is not a guarantee of payment. Patient has been advised to check with their own insurance.      Patient scheduled to return to clinic in 2 weeks for hearing aid fitting, programming and orientation.    Haley Palmer M.S.,[facility]  Audiologist #[id number]

## 2023-11-22 ENCOUNTER — OFFICE VISIT (OUTPATIENT)
Dept: AUDIOLOGY | Facility: OTHER | Age: 83
End: 2023-11-22
Attending: AUDIOLOGIST
Payer: MEDICARE

## 2023-11-22 DIAGNOSIS — H90.3 SENSORINEURAL HEARING LOSS (SNHL) OF BOTH EARS: Primary | ICD-10-CM

## 2023-11-22 PROCEDURE — 92593 HC HEARING AID CHECK, BINAURAL: CPT | Performed by: AUDIOLOGIST

## 2023-11-22 PROCEDURE — V5160 DISPENSING FEE BINAURAL: HCPCS | Performed by: AUDIOLOGIST

## 2023-11-22 PROCEDURE — V5011 HEARING AID FITTING/CHECKING: HCPCS | Performed by: AUDIOLOGIST

## 2023-11-22 PROCEDURE — 92593 PR HEARING AID CHECK, BINAURAL: CPT | Performed by: AUDIOLOGIST

## 2023-11-22 PROCEDURE — V5261 HEARING AID, DIGIT, BIN, BTE: HCPCS | Mod: NU | Performed by: AUDIOLOGIST

## 2023-11-22 PROCEDURE — V5020 CONFORMITY EVALUATION: HCPCS | Performed by: AUDIOLOGIST

## 2023-11-22 NOTE — PROGRESS NOTES
AUDIOLOGY REPORT    SUBJECTIVE: Iris Ellis, a 83 year old female, was seen in the Audiology Clinic at [facility] today for a Binaural hearing aid fitting. Previous results have revealed a bilateral  sensorineural hearing loss.     OBJECTIVE:  Prior to fitting, a hearing aid check was performed to ensure device functionality. The hearing aid conformity evaluation was completed.The hearing aids were placed and they provided a good fit. Real-ear-probe-microphone measurements were completed on the [employer] system and were a good match to NAL-NL2 target with soft sounds audible, moderate sounds comfortable, and loud sounds below discomfort. UCLs are verified through maximum power output measures and demonstrate appropriate limiting of loud inputs.     EAR(S) FIT:    MAKE: [employer]  Real 3 miniRITE T   DOME SIZE: 6mm DV    LENGTH: 2-85    SERIAL NUMBERS: Right: [id number] ; Left:  [id number]  WARRANTY END DATE: 12/9/26     ASSESSMENT: Hearing aid fitting completed today. Verification measures were performed. Patient and/or caregiver demonstrated ability to insert and remove hearing aids and adjust volume toggle.    Haley Palmer M.S., [facility]  Audiologist #[id number]      HEARING AID ORIENTATION/AUDIOLOGY ASSISTANT      Ms. Ellis was oriented to proper hearing aid use, care, cleaning (no water, dry brush), aid insertion/removal, user booklet, warranty information, storage cases, and other hearing aid details. The patient confirmed understanding of hearing aid use and care, and showed proper insertion of hearing aid and batteries while in the office today. Ms. Ellis reported good volume and sound quality today.    Chiqui Harrell  Audiology Assistant  [facility]  [phone]        PLAN: Ms. Ellis will return for follow-up in 2-3 weeks for a hearing aid review appointment. Please call this clinic with questions regarding today s appointme

## 2023-11-22 NOTE — PROGRESS NOTES
AUDIOLOGY REPORT    SUBJECTIVE: Iris Ellis, a 83 year old female, was seen in the Audiology Clinic at [facility] today for a Binaural hearing aid fitting. Previous results have revealed a bilateral  sensorineural hearing loss.     OBJECTIVE:  Prior to fitting, a hearing aid check was performed to ensure device functionality. The hearing aid conformity evaluation was completed.The hearing aids were placed and they provided a good fit. Real-ear-probe-microphone measurements were completed on the [employer] system and were a good match to NAL-NL2 target with soft sounds audible, moderate sounds comfortable, and loud sounds below discomfort. UCLs are verified through maximum power output measures and demonstrate appropriate limiting of loud inputs.     11/22/2023            Hearing Device Info   Left Ear Make: Oticon   Left Ear Model #: Real 3 miniRITE R   Left Ear Style: BTE   Left HA Warranty End Date: 12/9/2026   Left HA Serial #: [id number]   Right Ear Make: Oticon   Right Ear Model #: Real 3 miniRITE R   Right Ear Style: BTE   Right HA Warranty End Date: 12/9/2026   Right HA Serial #: [id number]   Fitting Plan: Standard     ASSESSMENT: Hearing aid fitting completed today. Verification measures were performed. Patient and/or caregiver demonstrated ability to insert and remove hearing aids and adjust volume toggle.    Haley Palmer M.S., [facility]  Audiologist #[id number]      HEARING AID ORIENTATION/AUDIOLOGY ASSISTANT      Ms. Ellis was oriented to proper hearing aid use, care, cleaning (no water, dry brush), batteries (size Rechargeable, low-battery signal), aid insertion/removal, user booklet, warranty information, storage cases, and other hearing aid details. The patient confirmed understanding of hearing aid use and care, and showed proper insertion of hearing aid while in the office today. Ms. Ellis reported good volume and sound quality today.    Chiqui Harrell  Audiology  Assistant  [facility]  [phone]        PLAN: Ms. Ellis will return for follow-up in 2-3 weeks for a hearing aid review appointment. Please call this clinic with questions regarding today s appointment.

## 2023-11-29 ENCOUNTER — ALLIED HEALTH/NURSE VISIT (OUTPATIENT)
Dept: AUDIOLOGY | Facility: OTHER | Age: 83
End: 2023-11-29
Attending: AUDIOLOGIST
Payer: MEDICARE

## 2023-11-29 DIAGNOSIS — H91.93 DECREASED HEARING OF BOTH EARS: Primary | ICD-10-CM

## 2023-11-29 PROCEDURE — V5299 HEARING SERVICE: HCPCS

## 2023-11-29 NOTE — PROGRESS NOTES
HEARING AID CHECK    BACKGROUND:  Iris Ellis, a 83 year old female, was seen today for an hearing aid check.  Ms. Ellis wears Binaural Oticon Real 3 miniRITE R hearing aids.  Ms. Ellis reported  wires are too short on hearing aids.    FINDINGS:  Visual inspection and cleaning provided.  wires changed from 2 - 85 to 3 - 85 on both aids. Battery was tested and good. Good listening check.    Reviewed cleaning, troubleshooting, and preventative care with patient. Any cleaning tools used were provided as customer courtesy.    Services performed and warranty reviewed with patient.    PLAN:  Based on patient report, no audiology appointment for adjustments needed.Ms. Ellis will return to clinic in 12 months, sooner if needed. Patient had no further questions and reports satisfaction.         Chiqui Harrell  Audiology Assistant  [facility]  [phone]

## 2023-12-19 ENCOUNTER — ALLIED HEALTH/NURSE VISIT (OUTPATIENT)
Dept: AUDIOLOGY | Facility: OTHER | Age: 83
End: 2023-12-19
Attending: AUDIOLOGIST
Payer: MEDICARE

## 2023-12-19 DIAGNOSIS — H91.93 DECREASED HEARING OF BOTH EARS: Primary | ICD-10-CM

## 2023-12-19 PROCEDURE — V5299 HEARING SERVICE: HCPCS

## 2023-12-19 NOTE — PROGRESS NOTES
HEARING AID CHECK    BACKGROUND:  Iris Ellis, a 83 year old female, was seen today for an hearing aid check.  Ms. Ellis wears Binaural Oticon Real 3 miniRITE R hearing aids.  Ms. Ellis reported left dome slipping out of ear.    FINDINGS:  Visual inspection and cleaning provided. Battery was tested and good. Good listening check.    Reviewed cleaning, troubleshooting, and preventative care with patient. Any cleaning tools used were provided as customer courtesy.    Services performed and warranty reviewed with patient.    PLAN:  Based on patient report, audiology appointment for adjustments needed. Appointment made to patients specifications. Patient had no further questions and reports satisfaction.         Chiqui Harrell  Audiology Assistant  [facility]  [phone]

## 2023-12-20 ENCOUNTER — OFFICE VISIT (OUTPATIENT)
Dept: AUDIOLOGY | Facility: OTHER | Age: 83
End: 2023-12-20
Attending: AUDIOLOGIST
Payer: MEDICARE

## 2023-12-20 DIAGNOSIS — H90.3 SENSORINEURAL HEARING LOSS (SNHL) OF BOTH EARS: Primary | ICD-10-CM

## 2023-12-20 PROCEDURE — V5299 HEARING SERVICE: HCPCS | Performed by: AUDIOLOGIST

## 2023-12-20 NOTE — PROGRESS NOTES
Background: Patient reports the 6mm DV do not stay in. She is aware of the retention hook recommendation and also offered custom earmolds. Declines these options today and will try 8mm open domes.    Results: Reviewed insertion and pulling back on pinna. Good fit and she feels this is improved.    Recommendations: Return if concerns for custom earmolds or retention hooks.    Haley Palmer M.S., [facility]  Audiologist #[id number]

## 2024-01-23 ASSESSMENT — PATIENT HEALTH QUESTIONNAIRE - PHQ9: SUM OF ALL RESPONSES TO PHQ QUESTIONS 1-9: 6

## 2024-01-23 ASSESSMENT — ANXIETY QUESTIONNAIRES
7. FEELING AFRAID AS IF SOMETHING AWFUL MIGHT HAPPEN: NOT AT ALL
5. BEING SO RESTLESS THAT IT IS HARD TO SIT STILL: NOT AT ALL
GAD7 TOTAL SCORE: 2
2. NOT BEING ABLE TO STOP OR CONTROL WORRYING: NOT AT ALL
1. FEELING NERVOUS, ANXIOUS, OR ON EDGE: NOT AT ALL
IF YOU CHECKED OFF ANY PROBLEMS ON THIS QUESTIONNAIRE, HOW DIFFICULT HAVE THESE PROBLEMS MADE IT FOR YOU TO DO YOUR WORK, TAKE CARE OF THINGS AT HOME, OR GET ALONG WITH OTHER PEOPLE: NOT DIFFICULT AT ALL
4. TROUBLE RELAXING: NOT AT ALL
3. WORRYING TOO MUCH ABOUT DIFFERENT THINGS: SEVERAL DAYS
6. BECOMING EASILY ANNOYED OR IRRITABLE: SEVERAL DAYS

## 2024-02-04 ENCOUNTER — MYC MEDICAL ADVICE (OUTPATIENT)
Dept: FAMILY MEDICINE | Facility: OTHER | Age: 84
End: 2024-02-04

## 2024-02-06 DIAGNOSIS — M81.0 AGE-RELATED OSTEOPOROSIS WITHOUT CURRENT PATHOLOGICAL FRACTURE: ICD-10-CM

## 2024-02-06 RX ORDER — ALENDRONATE SODIUM 70 MG/1
70 TABLET ORAL
Qty: 12 TABLET | Refills: 0 | Status: SHIPPED | OUTPATIENT
Start: 2024-02-06 | End: 2024-04-11

## 2024-03-07 ENCOUNTER — MYC MEDICAL ADVICE (OUTPATIENT)
Dept: FAMILY MEDICINE | Facility: OTHER | Age: 84
End: 2024-03-07

## 2024-03-08 NOTE — TELEPHONE ENCOUNTER
Called patient & together rescheduled labs for 9am on 3/13  Patient would like to fast to earlier appt works better

## 2024-03-19 ENCOUNTER — MYC MEDICAL ADVICE (OUTPATIENT)
Dept: FAMILY MEDICINE | Facility: OTHER | Age: 84
End: 2024-03-19

## 2024-03-21 DIAGNOSIS — I10 BENIGN ESSENTIAL HYPERTENSION: ICD-10-CM

## 2024-03-21 RX ORDER — METOPROLOL SUCCINATE 50 MG/1
50 TABLET, EXTENDED RELEASE ORAL DAILY
Qty: 90 TABLET | Refills: 0 | Status: SHIPPED | OUTPATIENT
Start: 2024-03-21 | End: 2024-03-25

## 2024-03-25 DIAGNOSIS — I10 BENIGN ESSENTIAL HYPERTENSION: ICD-10-CM

## 2024-03-25 RX ORDER — METOPROLOL SUCCINATE 50 MG/1
50 TABLET, EXTENDED RELEASE ORAL DAILY
Qty: 30 TABLET | Refills: 0 | Status: SHIPPED | OUTPATIENT
Start: 2024-03-25 | End: 2024-04-11

## 2024-03-28 ENCOUNTER — TRANSFERRED RECORDS (OUTPATIENT)
Dept: HEALTH INFORMATION MANAGEMENT | Facility: CLINIC | Age: 84
End: 2024-03-28

## 2024-03-29 ENCOUNTER — TELEPHONE (OUTPATIENT)
Dept: FAMILY MEDICINE | Facility: OTHER | Age: 84
End: 2024-03-29

## 2024-03-29 NOTE — TELEPHONE ENCOUNTER
11:08 AM    Reason for Call: OVERBOOK    Patient is pre[place] [facility] [place] R eye 4-23/ L eye 5-14 / Dr. Lundy     The patient is requesting an appointment for prior to surgery  with Dr. Rodríguez.    Was an appointment offered for this call? No  If yes : Appointment type (pt does have appt on 4-11)              Date    Preferred method for responding to this message: Telephone Call  What is your phone number ?     If we cannot reach you directly, may we leave a detailed response at the number you provided? No    Can this message wait until your PCP/provider returns, if unavailable today? María Alves

## 2024-04-03 ASSESSMENT — ENCOUNTER SYMPTOMS
DIARRHEA: 0
CONSTIPATION: 1
MYALGIAS: 0
PALPITATIONS: 0
FEVER: 0
ABDOMINAL PAIN: 0
FREQUENCY: 0
NAUSEA: 0
DYSURIA: 0
SHORTNESS OF BREATH: 0
ARTHRALGIAS: 1
BREAST MASS: 0
SORE THROAT: 0
NERVOUS/ANXIOUS: 0
JOINT SWELLING: 1
HEARTBURN: 0
CHILLS: 1
PARESTHESIAS: 0
HEADACHES: 0
HEMATOCHEZIA: 0
WEAKNESS: 0
HEMATURIA: 0
EYE PAIN: 0
COUGH: 0

## 2024-04-03 ASSESSMENT — ACTIVITIES OF DAILY LIVING (ADL): CURRENT_FUNCTION: NO ASSISTANCE NEEDED

## 2024-04-06 ENCOUNTER — HEALTH MAINTENANCE LETTER (OUTPATIENT)
Age: 84
End: 2024-04-06

## 2024-04-06 SDOH — HEALTH STABILITY: PHYSICAL HEALTH: ON AVERAGE, HOW MANY MINUTES DO YOU ENGAGE IN EXERCISE AT THIS LEVEL?: 20 MIN

## 2024-04-06 SDOH — HEALTH STABILITY: PHYSICAL HEALTH: ON AVERAGE, HOW MANY DAYS PER WEEK DO YOU ENGAGE IN MODERATE TO STRENUOUS EXERCISE (LIKE A BRISK WALK)?: 7 DAYS

## 2024-04-06 ASSESSMENT — ANXIETY QUESTIONNAIRES
7. FEELING AFRAID AS IF SOMETHING AWFUL MIGHT HAPPEN: NOT AT ALL
7. FEELING AFRAID AS IF SOMETHING AWFUL MIGHT HAPPEN: NOT AT ALL
8. IF YOU CHECKED OFF ANY PROBLEMS, HOW DIFFICULT HAVE THESE MADE IT FOR YOU TO DO YOUR WORK, TAKE CARE OF THINGS AT HOME, OR GET ALONG WITH OTHER PEOPLE?: NOT DIFFICULT AT ALL
3. WORRYING TOO MUCH ABOUT DIFFERENT THINGS: SEVERAL DAYS
GAD7 TOTAL SCORE: 2
2. NOT BEING ABLE TO STOP OR CONTROL WORRYING: NOT AT ALL
GAD7 TOTAL SCORE: 2
4. TROUBLE RELAXING: NOT AT ALL
5. BEING SO RESTLESS THAT IT IS HARD TO SIT STILL: NOT AT ALL
GAD7 TOTAL SCORE: 2
6. BECOMING EASILY ANNOYED OR IRRITABLE: SEVERAL DAYS
1. FEELING NERVOUS, ANXIOUS, OR ON EDGE: NOT AT ALL
IF YOU CHECKED OFF ANY PROBLEMS ON THIS QUESTIONNAIRE, HOW DIFFICULT HAVE THESE PROBLEMS MADE IT FOR YOU TO DO YOUR WORK, TAKE CARE OF THINGS AT HOME, OR GET ALONG WITH OTHER PEOPLE: NOT DIFFICULT AT ALL

## 2024-04-06 ASSESSMENT — SOCIAL DETERMINANTS OF HEALTH (SDOH): HOW OFTEN DO YOU GET TOGETHER WITH FRIENDS OR RELATIVES?: ONCE A WEEK

## 2024-04-06 ASSESSMENT — PATIENT HEALTH QUESTIONNAIRE - PHQ9
SUM OF ALL RESPONSES TO PHQ QUESTIONS 1-9: 6
SUM OF ALL RESPONSES TO PHQ QUESTIONS 1-9: 6
10. IF YOU CHECKED OFF ANY PROBLEMS, HOW DIFFICULT HAVE THESE PROBLEMS MADE IT FOR YOU TO DO YOUR WORK, TAKE CARE OF THINGS AT HOME, OR GET ALONG WITH OTHER PEOPLE: NOT DIFFICULT AT ALL

## 2024-04-11 ENCOUNTER — OFFICE VISIT (OUTPATIENT)
Dept: FAMILY MEDICINE | Facility: OTHER | Age: 84
End: 2024-04-11
Attending: FAMILY MEDICINE
Payer: MEDICARE

## 2024-04-11 ENCOUNTER — LAB (OUTPATIENT)
Dept: LAB | Facility: OTHER | Age: 84
End: 2024-04-11
Attending: FAMILY MEDICINE
Payer: MEDICARE

## 2024-04-11 VITALS
TEMPERATURE: 97.8 F | WEIGHT: 160 LBS | HEART RATE: 72 BPM | DIASTOLIC BLOOD PRESSURE: 60 MMHG | SYSTOLIC BLOOD PRESSURE: 138 MMHG | BODY MASS INDEX: 27.31 KG/M2 | OXYGEN SATURATION: 96 % | HEIGHT: 64 IN

## 2024-04-11 DIAGNOSIS — I10 BENIGN ESSENTIAL HYPERTENSION: ICD-10-CM

## 2024-04-11 DIAGNOSIS — Z63.6 CAREGIVER STRESS: ICD-10-CM

## 2024-04-11 DIAGNOSIS — E78.5 HYPERLIPIDEMIA LDL GOAL <100: ICD-10-CM

## 2024-04-11 DIAGNOSIS — R73.09 ELEVATED GLUCOSE: ICD-10-CM

## 2024-04-11 DIAGNOSIS — K21.9 GASTROESOPHAGEAL REFLUX DISEASE, UNSPECIFIED WHETHER ESOPHAGITIS PRESENT: ICD-10-CM

## 2024-04-11 DIAGNOSIS — Z00.00 MEDICARE ANNUAL WELLNESS VISIT, SUBSEQUENT: Primary | ICD-10-CM

## 2024-04-11 DIAGNOSIS — E83.41 HIGH MAGNESIUM LEVELS: ICD-10-CM

## 2024-04-11 DIAGNOSIS — E04.1 NODULE OF RIGHT LOBE OF THYROID GLAND: ICD-10-CM

## 2024-04-11 DIAGNOSIS — I47.20 VENTRICULAR TACHYCARDIA, UNSPECIFIED (H): ICD-10-CM

## 2024-04-11 DIAGNOSIS — M81.0 AGE-RELATED OSTEOPOROSIS WITHOUT CURRENT PATHOLOGICAL FRACTURE: ICD-10-CM

## 2024-04-11 DIAGNOSIS — E78.2 MIXED HYPERLIPIDEMIA: ICD-10-CM

## 2024-04-11 DIAGNOSIS — I47.10 PAROXYSMAL SUPRAVENTRICULAR TACHYCARDIA (H): ICD-10-CM

## 2024-04-11 LAB
ALBUMIN SERPL BCG-MCNC: 4.3 G/DL (ref 3.5–5.2)
ALP SERPL-CCNC: 54 U/L (ref 40–150)
ALT SERPL W P-5'-P-CCNC: 18 U/L (ref 0–50)
ANION GAP SERPL CALCULATED.3IONS-SCNC: 14 MMOL/L (ref 7–15)
AST SERPL W P-5'-P-CCNC: 21 U/L (ref 0–45)
BILIRUB SERPL-MCNC: 0.5 MG/DL
BUN SERPL-MCNC: 15.5 MG/DL (ref 8–23)
CALCIUM SERPL-MCNC: 9.5 MG/DL (ref 8.8–10.2)
CHLORIDE SERPL-SCNC: 107 MMOL/L (ref 98–107)
CHOLEST SERPL-MCNC: 154 MG/DL
CREAT SERPL-MCNC: 0.69 MG/DL (ref 0.51–0.95)
DEPRECATED HCO3 PLAS-SCNC: 24 MMOL/L (ref 22–29)
EGFRCR SERPLBLD CKD-EPI 2021: 85 ML/MIN/1.73M2
EST. AVERAGE GLUCOSE BLD GHB EST-MCNC: 126 MG/DL
GLUCOSE SERPL-MCNC: 105 MG/DL (ref 70–99)
HBA1C MFR BLD: 6 %
HDLC SERPL-MCNC: 60 MG/DL
HOLD SPECIMEN: NORMAL
HOLD SPECIMEN: NORMAL
LDLC SERPL CALC-MCNC: 65 MG/DL
NONHDLC SERPL-MCNC: 94 MG/DL
POTASSIUM SERPL-SCNC: 4.2 MMOL/L (ref 3.4–5.3)
PROT SERPL-MCNC: 6.8 G/DL (ref 6.4–8.3)
SODIUM SERPL-SCNC: 145 MMOL/L (ref 135–145)
TRIGL SERPL-MCNC: 145 MG/DL
TSH SERPL DL<=0.005 MIU/L-ACNC: 1.8 UIU/ML (ref 0.3–4.2)

## 2024-04-11 PROCEDURE — 83718 ASSAY OF LIPOPROTEIN: CPT | Mod: ZL | Performed by: FAMILY MEDICINE

## 2024-04-11 PROCEDURE — 99214 OFFICE O/P EST MOD 30 MIN: CPT | Mod: 25 | Performed by: FAMILY MEDICINE

## 2024-04-11 PROCEDURE — 36415 COLL VENOUS BLD VENIPUNCTURE: CPT | Mod: ZL

## 2024-04-11 PROCEDURE — 84443 ASSAY THYROID STIM HORMONE: CPT | Mod: ZL | Performed by: FAMILY MEDICINE

## 2024-04-11 PROCEDURE — G0463 HOSPITAL OUTPT CLINIC VISIT: HCPCS

## 2024-04-11 PROCEDURE — 82040 ASSAY OF SERUM ALBUMIN: CPT | Mod: ZL | Performed by: FAMILY MEDICINE

## 2024-04-11 PROCEDURE — 83036 HEMOGLOBIN GLYCOSYLATED A1C: CPT | Mod: ZL | Performed by: FAMILY MEDICINE

## 2024-04-11 PROCEDURE — G0439 PPPS, SUBSEQ VISIT: HCPCS | Performed by: FAMILY MEDICINE

## 2024-04-11 RX ORDER — METOPROLOL SUCCINATE 50 MG/1
25 TABLET, EXTENDED RELEASE ORAL DAILY
Qty: 90 TABLET | Refills: 3 | Status: SHIPPED | OUTPATIENT
Start: 2024-04-11

## 2024-04-11 RX ORDER — CITALOPRAM HYDROBROMIDE 10 MG/1
10 TABLET ORAL DAILY
Qty: 30 TABLET | Refills: 1 | Status: SHIPPED | OUTPATIENT
Start: 2024-04-11

## 2024-04-11 RX ORDER — METOPROLOL SUCCINATE 50 MG/1
50 TABLET, EXTENDED RELEASE ORAL DAILY
Qty: 90 TABLET | Refills: 3 | Status: SHIPPED | OUTPATIENT
Start: 2024-04-11 | End: 2024-04-11

## 2024-04-11 RX ORDER — FAMOTIDINE 40 MG/1
40 TABLET, FILM COATED ORAL DAILY
Qty: 90 TABLET | Refills: 3 | Status: SHIPPED | OUTPATIENT
Start: 2024-04-11

## 2024-04-11 RX ORDER — ROSUVASTATIN CALCIUM 20 MG/1
20 TABLET, COATED ORAL DAILY
Qty: 90 TABLET | Refills: 3 | Status: SHIPPED | OUTPATIENT
Start: 2024-04-11

## 2024-04-11 RX ORDER — ALENDRONATE SODIUM 70 MG/1
70 TABLET ORAL
Qty: 12 TABLET | Refills: 3 | Status: SHIPPED | OUTPATIENT
Start: 2024-04-11

## 2024-04-11 SDOH — HEALTH STABILITY: PHYSICAL HEALTH: ON AVERAGE, HOW MANY DAYS PER WEEK DO YOU ENGAGE IN MODERATE TO STRENUOUS EXERCISE (LIKE A BRISK WALK)?: 3 DAYS

## 2024-04-11 SDOH — HEALTH STABILITY: PHYSICAL HEALTH: ON AVERAGE, HOW MANY MINUTES DO YOU ENGAGE IN EXERCISE AT THIS LEVEL?: 20 MIN

## 2024-04-11 SDOH — SOCIAL STABILITY - SOCIAL INSECURITY: DEPENDENT RELATIVE NEEDING CARE AT HOME: Z63.6

## 2024-04-11 ASSESSMENT — ACTIVITIES OF DAILY LIVING (ADL): CURRENT_FUNCTION: NO ASSISTANCE NEEDED

## 2024-04-11 ASSESSMENT — LIFESTYLE VARIABLES
HOW OFTEN DO YOU HAVE SIX OR MORE DRINKS ON ONE OCCASION: NEVER
HOW OFTEN DO YOU HAVE A DRINK CONTAINING ALCOHOL: MONTHLY OR LESS
AUDIT-C TOTAL SCORE: 1
SKIP TO QUESTIONS 9-10: 1
HOW MANY STANDARD DRINKS CONTAINING ALCOHOL DO YOU HAVE ON A TYPICAL DAY: 1 OR 2

## 2024-04-11 ASSESSMENT — PAIN SCALES - GENERAL: PAINLEVEL: NO PAIN (0)

## 2024-04-11 NOTE — PROGRESS NOTES
"Preventive Care Visit  [facility]  Baylee Rodríguez MD, Family Medicine  Apr 11, 2024      Assessment & Plan     Medicare annual wellness visit, subsequent  Follow-up 1 year    Gastroesophageal reflux disease, unspecified whether esophagitis present  refilled  - famotidine (PEPCID) 40 MG tablet; Take 1 tablet (40 mg) by mouth daily    Mixed hyperlipidemia  The ASCVD Risk score (Pavan DK, et al., 2019) failed to calculate for the following reasons:    The 2019 ASCVD risk score is only valid for ages 40 to 79  Doing well, numbers look great  - Comprehensive metabolic panel  - Lipid Profile (Chol, Trig, HDL, LDL calc)    Nodule of right lobe of thyroid gland  Needs recheck of thyroid  - TSH with free T4 reflex  - US Thyroid; Future    High magnesium levels  Not discussed today    Paroxysmal supraventricular tachycardia (H24)  asymptomatic    Benign essential hypertension  Cut back to 1/2 tablet and see if she is symptomatic, will add CCB  - metoprolol succinate ER (TOPROL XL) 50 MG 24 hr tablet; Take 0.5 tablets (25 mg) by mouth daily    Caregiver stress  Start daily and follow-up in 2 wks for preop for cataracts  - citalopram (CELEXA) 10 MG tablet; Take 1 tablet (10 mg) by mouth daily    Elevated glucose  stable  - Hemoglobin A1c    Age-related osteoporosis without current pathological fracture  refilled  - alendronate (FOSAMAX) 70 MG tablet; Take 1 tablet (70 mg) by mouth every 7 days    Hyperlipidemia LDL goal <100  refilled  - rosuvastatin (CRESTOR) 20 MG tablet; Take 1 tablet (20 mg) by mouth daily    Ventricular tachycardia, unspecified (H)  asymptomatic    BMI  Estimated body mass index is 27.15 kg/m  as calculated from the following:    Height as of this encounter: 1.635 m (5' 4.37\").    Weight as of this encounter: 72.6 kg (160 lb).   Weight management plan: Discussed healthy diet and exercise guidelines    Counseling  Appropriate preventive services were discussed with this patient, including " "applicable screening as appropriate for fall prevention, nutrition, physical activity, Tobacco-use cessation, weight loss and cognition.  Checklist reviewing preventive services available has been given to the patient.  Reviewed patient's diet, addressing concerns and/or questions.   The patient's PHQ-9 score is consistent with mild depression. She was provided with information regarding depression.     Patient was agreeable to this plan and had no further questions.  There are no Patient Instructions on file for this visit.    No follow-ups on file.    Jenifer Simmons is a 83 year old, presenting for the following:  Physical        4/11/2024     1:26 PM   Additional Questions   Roomed by Skyler Mcdonnell   Accompanied by Self         4/11/2024     1:26 PM   Patient Reported Additional Medications   Patient reports taking the following new medications none         Health Care Directive  Patient does not have a Health Care Directive or Living Will: Discussed advance care planning with patient; information given to patient to review.    Healthy Habits:     In general, how would you rate your overall health?  Good    Frequency of exercise:  2-3 days/week    Duration of exercise:  30-45 minutes    Do you usually eat at least 4 servings of fruit and vegetables a day, include whole grains    & fiber and avoid regularly eating high fat or \"junk\" foods?  Yes    Taking medications regularly:  Yes    Medication side effects:  None    Ability to successfully perform activities of daily living:  No assistance needed    Home Safety:  No safety concerns identified    Hearing Impairment:  No hearing concerns    In the past 6 months, have you been bothered by leaking of urine?  No    In general, how would you rate your overall mental or emotional health?  Fair    Additional concerns today:  Yes    Hyperlipidemia Follow-Up    Are you regularly taking any medication or supplement to lower your cholesterol?   Yes- Crestor   Are you " having muscle aches or other side effects that you think could be caused by your cholesterol lowering medication?  No    Hypertension Follow-up    Do you check your blood pressure regularly outside of the clinic? Yes   Are you following a low salt diet? No  Are your blood pressures ever more than 140 on the top number (systolic) OR more   than 90 on the bottom number (diastolic), for example 140/90? No    Abnormal Mood Symptoms  Onset/Duration: 1-2 years  Description: fatigue, low motivation, caregiver for her   Depression (if yes, do PHQ-9): YES  Anxiety (if yes, do MILES-7): YES  Accompanying Signs & Symptoms:  Still participating in activities that you used to enjoy: No  Fatigue: YES  Irritability: No  Difficulty concentrating: YES  Changes in appetite: YES  Problems with sleep: YES  Heart racing/beating fast: YES  Abnormally elevated, expansive, or irritable mood: No  Persistently increased activity or energy: No  Thoughts of hurting yourself or others: No  History:  Recent stress or major life event: YES  Prior depression or anxiety: yes  Family history of depression or anxiety: YES  Alcohol/drug use: No  Difficulty sleeping: YES  Precipitating or alleviating factors: None  Therapies tried and outcome: none      1/23/2023     2:52 PM 1/23/2024     5:04 PM 4/6/2024    10:54 PM   PHQ   PHQ-9 Total Score 7 6    6    6 6   Q9: Thoughts of better off dead/self-harm past 2 weeks Not at all Not at all Not at all         10/31/2019    10:00 AM 1/23/2024     5:05 PM 4/6/2024    10:54 PM   MILES-7 SCORE   Total Score  2 (minimal anxiety) 2 (minimal anxiety)   Total Score 0 2    2    2 2         4/6/2024   General Health   How would you rate your overall physical health? Good   Feel stress (tense, anxious, or unable to sleep) To some extent   (!) STRESS CONCERN      4/6/2024   Nutrition   Diet: Regular (no restrictions)    Low fat/cholesterol    Carbohydrate counting         4/6/2024   Exercise   Days per week of  moderate/strenous exercise 7 days   Average minutes spent exercising at this level 20 min         4/6/2024   Social Factors   Frequency of gathering with friends or relatives Once a week   Worry food won't last until get money to buy more No   Food not last or not have enough money for food? No   Do you have housing?  Yes   Are you worried about losing your housing? No   Lack of transportation? No   Unable to get utilities (heat,electricity)? No         4/11/2024   Fall Risk   Fallen 2 or more times in the past year? No   Trouble with walking or balance? Yes   Gait Speed Test (Document in seconds) 0.04   Gait Speed Test Interpretation Less than or equal to 5.00 seconds - PASS          4/6/2024   Activities of Daily Living- Home Safety   Needs help with the following daily activites None of the above   Safety concerns in the home None of the above         4/6/2024   Dental   Dentist two times every year? Yes         4/6/2024   Hearing Screening   Hearing concerns? None of the above         4/6/2024   Driving Risk Screening   Patient/family members have concerns about driving No         4/6/2024   General Alertness/Fatigue Screening   Have you been more tired than usual lately? No         4/6/2024   Urinary Incontinence Screening   Bothered by leaking urine in past 6 months No         4/6/2024   TB Screening   Were you born outside of the [place]? No       Today's PHQ-9 Score:       4/6/2024    10:54 PM   PHQ-9 SCORE   PHQ-9 Total Score MyChart 6 (Mild depression)   PHQ-9 Total Score 6         4/6/2024   Substance Use   Alcohol more than 3/day or more than 7/wk No   Do you have a current opioid prescription? No   How severe/bad is pain from 1 to 10? 0/10 (No Pain)   Do you use any other substances recreationally? No     Social History     Tobacco Use    Smoking status: Never    Smokeless tobacco: Never    Tobacco comments:     Was only a sort time 1971 to 1973   Vaping Use    Vaping status: Never Used   Substance Use  Topics    Alcohol use: Yes     Comment: Wine occasionally when out to dinners    Drug use: No          Mammogram Screening - After age 74- determine frequency with patient based on health status, life expectancy and patient goals        Reviewed and updated as needed this visit by Provider                  Past Medical History:   Diagnosis Date    Blood glucose elevated     BPPV (benign paroxysmal positional vertigo)     Cystic disease of ovaries     Essential hypertension 2006    Family history of malignant neoplasm of gastrointestinal tract 2003    colon cancer in mother    Lymphocytic colitis 10/30/2020    [facility]    Osteoarthritis     Right hip LYNN, Dr Nelson    Osteoporosis     Other abnormal blood chemistry 2012    Paroxysmal supraventricular tachycardia (H24) 2023    psoriasis 2011    Pure hypercholesterolemia 1999    Symptomatic menopausal or female climacteric states 2003    Thyroid nodule 2023    Unspecified closed fracture of pelvis 2011    Urethral stricture      Past Surgical History:   Procedure Laterality Date    APPENDECTOMY  1944    AS REVISE TOTAL HIP REPLACEMENT Left     Dr Whaley    CHOLECYSTECTOMY  1979    colonoscopies  , , , , 2014    Colonic polyps. Repeat colonoscopy 2013    Declines further mammograms  2009    Left superior and inferior pubic rami fractures  2006    ORTHOPEDIC SURGERY Right 2011    total right hip. Dr. Nelson    PACs, PVCs, SVT, bradycardia on Holter Moniter  2009    Dr. Pace    Urethral dilation  2003    Urethral stricture     OB History    Para Term  AB Living   1 1 1 0 0 1   SAB IAB Ectopic Multiple Live Births   0 0 0 0 0      # Outcome Date GA Lbr Moisés/2nd Weight Sex Type Anes PTL Lv   1 Term              Lab work is in process  Labs reviewed in EPIC  BP Readings from Last 3 Encounters:   24 138/60   23 118/52    06/23/23 128/81    Wt Readings from Last 3 Encounters:   04/11/24 72.6 kg (160 lb)   08/09/23 70.3 kg (155 lb)   06/14/23 70.4 kg (155 lb 3.2 oz)                  Patient Active Problem List   Diagnosis    Knee pain    Elevated LFTs    Sleep related leg cramps    Preventative health care    Elevated glucose    Mammogram declined    Advanced care planning/counseling discussion    Benign essential hypertension    Age-related osteoporosis without current pathological fracture    Lymphocytic colitis    Caregiver stress    Multiple joint pain    Benign paroxysmal positional vertigo due to bilateral vestibular disorder    Obesity without serious comorbidity, unspecified classification, unspecified obesity type    Post-menopausal    Right foot pain    Acute bilateral thoracic back pain    Acute bilateral low back pain without sciatica    Gastroesophageal reflux disease, unspecified whether esophagitis present    Iron deficiency    High magnesium levels    Syncope and collapse    Episodic confusion    Vision changes    Nodule of right lobe of thyroid gland    Paroxysmal supraventricular tachycardia (H24)    Ventricular trigeminy    Transient alteration of awareness    Ventricular ectopy    Hyperlipidemia LDL goal <100    Ventricular tachycardia, unspecified (H)     Past Surgical History:   Procedure Laterality Date    APPENDECTOMY  01/01/1944    AS REVISE TOTAL HIP REPLACEMENT Left 2018    Dr Whaley    CHOLECYSTECTOMY  01/01/1979    colonoscopies  1998, 2003, 2007, 2009, 9/18/2014    Colonic polyps. Repeat colonoscopy 2013    Declines further mammograms  01/01/2009    Left superior and inferior pubic rami fractures  01/01/2006    ORTHOPEDIC SURGERY Right 01/01/2011    total right hip. Dr. Nelson    PACs, PVCs, SVT, bradycardia on Holter Moniter  01/01/2009    Dr. Pace    Urethral dilation  01/01/2003    Urethral stricture       Social History     Tobacco Use    Smoking status: Never    Smokeless tobacco: Never     Tobacco comments:     Was only a sort time  to    Substance Use Topics    Alcohol use: Yes     Comment: Wine occasionally when out to dinners     Family History   Problem Relation Age of Onset    Diabetes Mother 59         age 59 of pulmonar embolism  after amputation of leg due to diabetes    Alzheimer Disease Father 82    Coronary Artery Disease Sister         Heart attack    Colon Cancer Sister 84    Alzheimer Disease Sister         passed     Colon Cancer Brother 69    Lung Cancer Brother     Prostate Cancer Brother     Unknown/Adopted Maternal Grandmother     Unknown/Adopted Maternal Grandfather     Unknown/Adopted Paternal Grandmother     Unknown/Adopted Paternal Grandfather     Cancer Other         Colon-family h/o         Current Outpatient Medications   Medication Sig Dispense Refill    alendronate (FOSAMAX) 70 MG tablet Take 1 tablet (70 mg) by mouth every 7 days 12 tablet 3    ASPIRIN 81 PO Take 81 mg by mouth daily      Calcium Carbonate-Vit D-Min (CALCIUM 1200 PO) Take  by mouth. Take 1 daily      citalopram (CELEXA) 10 MG tablet Take 1 tablet (10 mg) by mouth daily 30 tablet 1    famotidine (PEPCID) 40 MG tablet Take 1 tablet (40 mg) by mouth daily 90 tablet 3    Lactobacillus (ACIDOPHILUS PO) 1 daily      metoprolol succinate ER (TOPROL XL) 50 MG 24 hr tablet Take 0.5 tablets (25 mg) by mouth daily 90 tablet 3    multivitamin (OCUVITE) TABS tablet Take 1 tablet by mouth daily 90 each 3    rosuvastatin (CRESTOR) 20 MG tablet Take 1 tablet (20 mg) by mouth daily 90 tablet 3     Allergies   Allergen Reactions    Nabumetone Rash    Acetaminophen Nausea     Lortab    Naproxen Nausea     Naprosyn    Rofecoxib      Upsets stomach  Vioxx    Hydrocodone Bitartrate Nausea     Lortab     Recent Labs   Lab Test 24  0900 23  1648 23  2123 23  1448 22  0851 22  1331 22  1331 21  0925 20  1347   A1C 6.0*  --   --  5.9* 6.0*  --   --  5.7*  --     LDL 65  --   --   --  120*  --   --  107*  --    HDL 60  --   --   --  67  --   --  70  --    TRIG 145  --   --   --  148  --   --  157*  --    ALT 18  --   --   --  32  --  34 27  --    CR 0.69  --  0.86  --  0.80   < > 0.75 0.74 0.67   GFRESTIMATED 85  --  66  --  72   < > 78 76 82   GFRESTBLACK  --   --   --   --   --   --   --  88 >90   POTASSIUM 4.2  --  4.2  --  4.7   < > 4.5 4.3 3.5   TSH 1.80 1.33  --   --   --   --   --   --   --     < > = values in this interval not displayed.      Current providers sharing in care for this patient include:  Patient Care Team:  Baylee Rodríguez MD as PCP - General (Family Medicine)  Silva Robledo PA-C as Assigned Surgical Provider  Baylee Rodríguez MD as Assigned PCP  Dylan Gunderson,  as Assigned Heart and Vascular Provider    The following health maintenance items are reviewed in Epic and correct as of today:  Health Maintenance   Topic Date Due    ZOSTER IMMUNIZATION (1 of 2) Never done    RSV VACCINE (Pregnancy & 60+) (1 - 1-dose 60+ series) Never done    Pneumococcal Vaccine: 65+ Years (2 of 2 - PPSV23 or PCV20) 12/26/2019    DTAP/TDAP/TD IMMUNIZATION (2 - Td or Tdap) 03/28/2023    INFLUENZA VACCINE (1) 09/01/2023    COVID-19 Vaccine (5 - 2023-24 season) 09/01/2023    MEDICARE ANNUAL WELLNESS VISIT  01/23/2024    LIPID  04/11/2025    FALL RISK ASSESSMENT  04/11/2025    ADVANCE CARE PLANNING  01/23/2028    PHQ-2 (once per calendar year)  Completed    IPV IMMUNIZATION  Aged Out    HPV IMMUNIZATION  Aged Out    MENINGITIS IMMUNIZATION  Aged Out    RSV MONOCLONAL ANTIBODY  Aged Out    COLORECTAL CANCER SCREENING  Discontinued         Review of Systems  Constitutional, HEENT, cardiovascular, pulmonary, GI, , musculoskeletal, neuro, skin, endocrine and psych systems are negative, except as otherwise noted.     Objective    Exam  /60 (BP Location: Right arm, Patient Position: Sitting, Cuff Size: Adult Regular)   Pulse 72   Temp 97.8  F (36.6  C) (Tympanic)  "  Ht 1.635 m (5' 4.37\")   Wt 72.6 kg (160 lb)   SpO2 96%   BMI 27.15 kg/m     Estimated body mass index is 27.15 kg/m  as calculated from the following:    Height as of this encounter: 1.635 m (5' 4.37\").    Weight as of this encounter: 72.6 kg (160 lb).    Physical Exam  GENERAL: alert and no distress  EYES: Eyes grossly normal to inspection, PERRL and conjunctivae and sclerae normal  HENT: ear canals and TM's normal, nose and mouth without ulcers or lesions  NECK: no adenopathy, no asymmetry, masses, or scars  RESP: lungs clear to auscultation - no rales, rhonchi or wheezes  CV: regular rate and rhythm, normal S1 S2, no S3 or S4, no murmur, click or rub, no peripheral edema  ABDOMEN: soft, nontender, no hepatosplenomegaly, no masses and bowel sounds normal  MS: no gross musculoskeletal defects noted, no edema  SKIN: no suspicious lesions or rashes  NEURO: Normal strength and tone, mentation intact and speech normal  PSYCH: mentation appears normal, affect normal/bright         4/11/2024   Mini Cog   Clock Draw Score 2 Normal   3 Item Recall 3 objects recalled   Mini Cog Total Score 5              Signed Electronically by: Baylee Rodríguez MD    Answers submitted by the patient for this visit:  Patient Health Questionnaire (Submitted on 4/6/2024)  If you checked off any problems, how difficult have these problems made it for you to do your work, take care of things at home, or get along with other people?: Not difficult at all  PHQ9 TOTAL SCORE: 6  MILES-7 (Submitted on 4/6/2024)  MILES 7 TOTAL SCORE: 2  Annual Preventive Visit (Submitted on 4/3/2024)  Chief Complaint: Annual Exam:    "

## 2024-04-15 ENCOUNTER — ANESTHESIA EVENT (OUTPATIENT)
Dept: SURGERY | Facility: HOSPITAL | Age: 84
End: 2024-04-15
Payer: MEDICARE

## 2024-04-15 ASSESSMENT — ENCOUNTER SYMPTOMS: DYSRHYTHMIAS: 1

## 2024-04-15 NOTE — ANESTHESIA PREPROCEDURE EVALUATION
Anesthesia Pre-Procedure Evaluation    Patient: Iris Ellis   MRN: [id number] : 1940        Procedure : Procedure(s):  Phacoemulsification Cataract Extraction Posterior chamber Lens Right Eye          Past Medical History:   Diagnosis Date     Blood glucose elevated      BPPV (benign paroxysmal positional vertigo)      Cystic disease of ovaries      Essential hypertension 2006     Family history of malignant neoplasm of gastrointestinal tract 2003    colon cancer in mother     Lymphocytic colitis 10/30/2020    Dr [facility], [facility]     Osteoarthritis     Right hip LYNN, Dr Nelson     Osteoporosis      Other abnormal blood chemistry 2012     Paroxysmal supraventricular tachycardia (H24) 2023     psoriasis 2011     Pure hypercholesterolemia 1999     Symptomatic menopausal or female climacteric states 2003     Thyroid nodule 2023     Unspecified closed fracture of pelvis 2011     Urethral stricture       Past Surgical History:   Procedure Laterality Date     APPENDECTOMY  1944     AS REVISE TOTAL HIP REPLACEMENT Left 2018    Dr Whaley     CHOLECYSTECTOMY  1979     colonoscopies  , , , , 2014    Colonic polyps. Repeat colonoscopy 2013     Declines further mammograms  2009     Left superior and inferior pubic rami fractures  2006     ORTHOPEDIC SURGERY Right 2011    total right hip. Dr. Nelson     PACs, PVCs, SVT, bradycardia on Holter Moniter  2009    Dr. Pace     Urethral dilation  2003    Urethral stricture      Allergies   Allergen Reactions     Nabumetone Rash     Acetaminophen Nausea     Lortab     Naproxen Nausea     Naprosyn     Rofecoxib      Upsets stomach  Vioxx     Hydrocodone Bitartrate Nausea     Lortab      Social History     Tobacco Use     Smoking status: Never     Smokeless tobacco: Never     Tobacco comments:     Was only a sort time  to    Substance Use Topics      Alcohol use: Yes     Comment: Wine occasionally when out to dinners      Wt Readings from Last 1 Encounters:   04/11/24 72.6 kg (160 lb)        Anesthesia Evaluation   Pt has had prior anesthetic. Type: General and MAC.        ROS/MED HX  ENT/Pulmonary:     (+)                tobacco use, Past use,                       Neurologic: Comment: Episodic confusion    (+)              TIA,                  Cardiovascular: Comment: benign paroxysmal positional vertigo  Ventricular ectopy  Ventricular tachycardia, unspecified  Ventricular trigeminy    Bilateral ultrasound carotid Normal examination. No acute abnormality. No evidence of apparent stenosis, occlusion. No evidence of aneurysm or arteriovenous  malformation.  No evidence of flow-limiting atherosclerotic disease of the neck.           (+) Dyslipidemia hypertension- -   -  - -                        dysrhythmias (paroxysmal SVT),         Previous cardiac testing   Echo: Date: Results:    Stress Test:  Date: 2017 Results:  1.  No evidence of myocardial ischemia.  2.  Normal left ventricular function.    ECG Reviewed:  Date: 8/2023 Results:  NSR @ 70bpm   Cath:  Date: Results:      METS/Exercise Tolerance: >4 METS    Hematologic:  - neg hematologic  ROS     Musculoskeletal: Comment: Osteoporosis  Acute bilateral low back pain without sciatica  Acute bilateral thoracic back pain      (+)  arthritis,             GI/Hepatic: Comment: Lymphocytic colitis    (+) GERD,            liver disease (elevated LFTs),       Renal/Genitourinary: Comment: Cystic disease of ovaries  Urethral stricture      Endo: Comment: Blood glucose elevated    (+)  type II DM, Last HgA1c: 6.0, date: 4/11/24,      thyroid problem, Nodule of right lobe of thyroid gland,    Obesity,       Psychiatric/Substance Use:       Infectious Disease:       Malignancy:       Other: Comment: psoriasis     (+)  , H/O Chronic Pain,         Physical Exam    Airway        Mallampati: III   TM distance: > 3 FB  "  Neck ROM: full   Mouth opening: > 3 cm    Respiratory Devices and Support         Dental       (+) Removable bridges or other hardware      Cardiovascular       Comment: Occasional PVCs   Rhythm and rate: regular and normal     Pulmonary           breath sounds clear to auscultation       OUTSIDE LABS:  CBC:   Lab Results   Component Value Date    WBC 7.4 05/16/2023    WBC 6.5 11/22/2022    HGB 13.6 05/16/2023    HGB 13.6 11/22/2022    HCT 42.8 05/16/2023    HCT 42.4 11/22/2022     05/16/2023     11/22/2022     BMP:   Lab Results   Component Value Date     04/11/2024     05/16/2023    POTASSIUM 4.2 04/11/2024    POTASSIUM 4.2 05/16/2023    CHLORIDE 107 04/11/2024    CHLORIDE 101 05/16/2023    CO2 24 04/11/2024    CO2 29 05/16/2023    BUN 15.5 04/11/2024    BUN 12.5 05/16/2023    CR 0.69 04/11/2024    CR 0.86 05/16/2023     (H) 04/11/2024     (H) 05/16/2023     COAGS: No results found for: \"PTT\", \"INR\", \"FIBR\"  POC: No results found for: \"BGM\", \"HCG\", \"HCGS\"  HEPATIC:   Lab Results   Component Value Date    ALBUMIN 4.3 04/11/2024    PROTTOTAL 6.8 04/11/2024    ALT 18 04/11/2024    AST 21 04/11/2024    ALKPHOS 54 04/11/2024    BILITOTAL 0.5 04/11/2024     OTHER:   Lab Results   Component Value Date    A1C 6.0 (H) 04/11/2024    HORACIO 9.5 04/11/2024    MAG 2.1 05/24/2023    TSH 1.80 04/11/2024       Anesthesia Plan    ASA Status:  3    NPO Status:  NPO Appropriate    Anesthesia Type: MAC.     - Reason for MAC: straight local not clinically adequate              Consents    Anesthesia Plan(s) and associated risks, benefits, and realistic alternatives discussed. Questions answered and patient/representative(s) expressed understanding.     - Discussed: Risks, Benefits and Alternatives for BOTH SEDATION and the PROCEDURE were discussed     - Discussed with:  Patient      - Extended Intubation/Ventilatory Support Discussed: No.      - Patient is DNR/DNI Status: No     Use of blood " "products discussed: No .     Postoperative Care            Comments:    Other Comments: Risks and benefits of MAC anesthetic discussed including dental damage, aspiration, loss of airway, conversion to general anesthetic, CV complications, MI, stroke, death. Pt wishes to proceed.            JOY BRITO CRNA    I have reviewed the pertinent notes and labs in the chart from the past 30 days and (re)examined the patient.  Any updates or changes from those notes are reflected in this note.              # Overweight: Estimated body mass index is 27.15 kg/m  as calculated from the following:    Height as of 4/11/24: 1.635 m (5' 4.37\").    Weight as of 4/11/24: 72.6 kg (160 lb).      "

## 2024-04-19 ENCOUNTER — OFFICE VISIT (OUTPATIENT)
Dept: FAMILY MEDICINE | Facility: OTHER | Age: 84
End: 2024-04-19
Attending: FAMILY MEDICINE
Payer: MEDICARE

## 2024-04-19 VITALS
BODY MASS INDEX: 28 KG/M2 | SYSTOLIC BLOOD PRESSURE: 136 MMHG | OXYGEN SATURATION: 96 % | TEMPERATURE: 97.7 F | DIASTOLIC BLOOD PRESSURE: 58 MMHG | HEART RATE: 55 BPM | WEIGHT: 165 LBS

## 2024-04-19 DIAGNOSIS — Z01.818 PREOP GENERAL PHYSICAL EXAM: Primary | ICD-10-CM

## 2024-04-19 DIAGNOSIS — F41.1 GAD (GENERALIZED ANXIETY DISORDER): ICD-10-CM

## 2024-04-19 DIAGNOSIS — H26.9 CATARACT OF BOTH EYES, UNSPECIFIED CATARACT TYPE: ICD-10-CM

## 2024-04-19 LAB
ERYTHROCYTE [DISTWIDTH] IN BLOOD BY AUTOMATED COUNT: 13.7 % (ref 10–15)
HCT VFR BLD AUTO: 38.8 % (ref 35–47)
HGB BLD-MCNC: 12.6 G/DL (ref 11.7–15.7)
MCH RBC QN AUTO: 29.4 PG (ref 26.5–33)
MCHC RBC AUTO-ENTMCNC: 32.5 G/DL (ref 31.5–36.5)
MCV RBC AUTO: 90 FL (ref 78–100)
PLATELET # BLD AUTO: 323 10E3/UL (ref 150–450)
RBC # BLD AUTO: 4.29 10E6/UL (ref 3.8–5.2)
WBC # BLD AUTO: 6 10E3/UL (ref 4–11)

## 2024-04-19 PROCEDURE — G0463 HOSPITAL OUTPT CLINIC VISIT: HCPCS

## 2024-04-19 PROCEDURE — 85018 HEMOGLOBIN: CPT | Mod: ZL | Performed by: FAMILY MEDICINE

## 2024-04-19 PROCEDURE — 99214 OFFICE O/P EST MOD 30 MIN: CPT | Performed by: FAMILY MEDICINE

## 2024-04-19 PROCEDURE — 36415 COLL VENOUS BLD VENIPUNCTURE: CPT | Mod: ZL | Performed by: FAMILY MEDICINE

## 2024-04-19 ASSESSMENT — PAIN SCALES - GENERAL: PAINLEVEL: NO PAIN (0)

## 2024-04-19 ASSESSMENT — LIFESTYLE VARIABLES: TOBACCO_USE: 1

## 2024-04-19 NOTE — H&P (VIEW-ONLY)
Preoperative Evaluation  [facility] [place]  [address]  [place] [address]  Phone: [phone]  Primary Provider: Mere Rodríguez  Pre-op Performing Provider: MERE RODRÍGUEZ  Apr 19, 2024     Iris is a 84 year old, presenting for the following:  Pre-Op Exam        4/19/2024     4:36 PM   Additional Questions   Roomed by Skyler Mcdonnell   Accompanied by Self         4/19/2024     4:36 PM   Patient Reported Additional Medications   Patient reports taking the following new medications none     Surgical Information  Surgery/Procedure: Phacoemulsification Cataract Extraction Posterior Chamber Lens Right Eye   [place] [facility] [place]: Kamron   Surgery Date: 4/22/2024  Time of Surgery: Unknown   Where patient plans to recover: At home with family  Fax number for surgical facility: Note does not need to be faxed, will be available electronically in Epic.    Assessment & Plan     The proposed surgical procedure is considered LOW risk.    Preop general physical exam  Cleared for surgery  - CBC with platelets; Future  - CBC with platelets    Cataract of both eyes, unspecified cataract type  Reason for surgery  - CBC with platelets; Future  - CBC with platelets    MILES (generalized anxiety disorder)  No side effects from celexa so far has been on it 3-4 days      - No identified additional risk factors other than previously addressed    Antiplatelet or Anticoagulation Medication Instructions   - Bleeding risk is low for this procedure (e.g. dental, skin, cataract).    Additional Medication Instructions  Patient is to take all scheduled medications on the day of surgery    Recommendation  APPROVAL GIVEN to proceed with proposed procedure, without further diagnostic evaluation.      Subjective   HPI related to upcoming procedure: bilateral cataracts        4/16/2024     9:54 AM   Preop Questions   1. Have you ever had a heart attack or stroke? No   2. Have you ever had surgery on your heart or  blood vessels, such as a stent placement, a coronary artery bypass, or surgery on an artery in your head, neck, heart, or legs? No   3. Do you have chest pain with activity? No   4. Do you have a history of  heart failure? No   5. Do you currently have a cold, bronchitis or symptoms of other infection? No   6. Do you have a cough, shortness of breath, or wheezing? No   7. Do you or anyone in your family have previous history of blood clots? YES - Mother  of pulmonary embolism in  after an amputation    8. Do you or does anyone in your family have a serious bleeding problem such as prolonged bleeding following surgeries or cuts? No   9. Have you ever had problems with anemia or been told to take iron pills? YES - Took iron pills a couple years ago    10. Have you had any abnormal blood loss such as black, tarry or bloody stools, or abnormal vaginal bleeding? No   11. Have you ever had a blood transfusion? No   12. Are you willing to have a blood transfusion if it is medically needed before, during, or after your surgery? Yes   13. Have you or any of your relatives ever had problems with anesthesia? No   14. Do you have sleep apnea, excessive snoring or daytime drowsiness? No   15. Do you have any artifical heart valves or other implanted medical devices like a pacemaker, defibrillator, or continuous glucose monitor? No   16. Do you have artificial joints? YES - bilateral hip replacement    17. Are you allergic to latex? YES: Unsure if she still is. Unsure of reaction        Health Care Directive  Patient does not have a Health Care Directive or Living Will: Patient states has Advance Directive and will bring in a copy to clinic.    Preoperative Review of    reviewed - no record of controlled substances prescribed.      Status of Chronic Conditions:  See problem list for active medical problems.  Problems all longstanding and stable, except as noted/documented.  See ROS for pertinent symptoms related to  these conditions.    Patient Active Problem List    Diagnosis Date Noted    Ventricular tachycardia, unspecified (H) 04/11/2024     Priority: Medium    Paroxysmal supraventricular tachycardia (H24) 08/09/2023     Priority: Medium    Ventricular trigeminy 08/09/2023     Priority: Medium    Transient alteration of awareness 08/09/2023     Priority: Medium    Ventricular ectopy 08/09/2023     Priority: Medium    Hyperlipidemia LDL goal <100 08/09/2023     Priority: Medium    Nodule of right lobe of thyroid gland 06/14/2023     Priority: Medium    High magnesium levels 05/24/2023     Priority: Medium    Syncope and collapse 05/24/2023     Priority: Medium    Episodic confusion 05/24/2023     Priority: Medium    Vision changes 05/24/2023     Priority: Medium    Gastroesophageal reflux disease, unspecified whether esophagitis present 03/29/2023     Priority: Medium    Iron deficiency 03/29/2023     Priority: Medium    Acute bilateral thoracic back pain 02/24/2023     Priority: Medium    Acute bilateral low back pain without sciatica 02/24/2023     Priority: Medium    Post-menopausal 01/23/2023     Priority: Medium    Right foot pain 01/23/2023     Priority: Medium    Caregiver stress 11/18/2022     Priority: Medium    Multiple joint pain 11/18/2022     Priority: Medium    Benign paroxysmal positional vertigo due to bilateral vestibular disorder 11/18/2022     Priority: Medium    Obesity without serious comorbidity, unspecified classification, unspecified obesity type 11/18/2022     Priority: Medium    Lymphocytic colitis 10/30/2020     Priority: Medium     [facility]      Age-related osteoporosis without current pathological fracture 11/08/2019     Priority: Medium    Benign essential hypertension 07/31/2016     Priority: Medium    Knee pain 03/28/2013     Priority: Medium    Elevated LFTs 03/28/2013     Priority: Medium    Sleep related leg cramps 03/28/2013     Priority: Medium    Preventative health care  03/28/2013     Priority: Medium    Elevated glucose 03/28/2013     Priority: Medium    Mammogram declined 03/28/2013     Priority: Medium    Advanced care planning/counseling discussion 05/21/2012     Priority: Medium      Past Medical History:   Diagnosis Date    Blood glucose elevated     BPPV (benign paroxysmal positional vertigo)     Cystic disease of ovaries     Essential hypertension 07/2006    Family history of malignant neoplasm of gastrointestinal tract 04/25/2003    colon cancer in mother    Lymphocytic colitis 10/30/2020    Dr Adams, [facility]    Osteoarthritis 1/20011    Right hip [facility], Dr Nelson    Osteoporosis     Other abnormal blood chemistry 09/20/2012    Paroxysmal supraventricular tachycardia (H24) 8/9/2023    psoriasis 04/21/2011    Pure hypercholesterolemia 11/22/1999    Symptomatic menopausal or female climacteric states 03/31/2003    Thyroid nodule 5/24/2023    Unspecified closed fracture of pelvis 09/20/2011    Urethral stricture      Past Surgical History:   Procedure Laterality Date    APPENDECTOMY  01/01/1944    AS REVISE TOTAL HIP REPLACEMENT Left 2018    Dr Whaley    CHOLECYSTECTOMY  01/01/1979    colonoscopies  1998, 2003, 2007, 2009, 9/18/2014    Colonic polyps. Repeat colonoscopy 2013    Declines further mammograms  01/01/2009    Left superior and inferior pubic rami fractures  01/01/2006    ORTHOPEDIC SURGERY Right 01/01/2011    total right hip. Dr. Nelson    PACs, PVCs, SVT, bradycardia on Holter Moniter  01/01/2009    Dr. Pace    Urethral dilation  01/01/2003    Urethral stricture     Current Outpatient Medications   Medication Sig Dispense Refill    alendronate (FOSAMAX) 70 MG tablet Take 1 tablet (70 mg) by mouth every 7 days 12 tablet 3    ASPIRIN 81 PO Take 81 mg by mouth daily      Calcium Carbonate-Vit D-Min (CALCIUM 1200 PO) Take  by mouth. Take 1 daily      citalopram (CELEXA) 10 MG tablet Take 1 tablet (10 mg) by mouth daily 30 tablet 1    famotidine (PEPCID) 40 MG tablet  Take 1 tablet (40 mg) by mouth daily 90 tablet 3    Lactobacillus (ACIDOPHILUS PO) 1 daily      metoprolol succinate ER (TOPROL XL) 50 MG 24 hr tablet Take 0.5 tablets (25 mg) by mouth daily 90 tablet 3    multivitamin (OCUVITE) TABS tablet Take 1 tablet by mouth daily 90 each 3    rosuvastatin (CRESTOR) 20 MG tablet Take 1 tablet (20 mg) by mouth daily 90 tablet 3       Allergies   Allergen Reactions    Nabumetone Rash    Acetaminophen Nausea     Lortab    Naproxen Nausea     Naprosyn    Rofecoxib      Upsets stomach  Vioxx    Hydrocodone Bitartrate Nausea     Lortab        Social History     Tobacco Use    Smoking status: Never    Smokeless tobacco: Never    Tobacco comments:     Was only a sort time  to    Substance Use Topics    Alcohol use: Yes     Comment: Wine occasionally when out to dinners     Family History   Problem Relation Age of Onset    Diabetes Mother 59         age 59 of pulmonar embolism  after amputation of leg due to diabetes    Alzheimer Disease Father 82    Coronary Artery Disease Sister         Heart attack    Colon Cancer Sister 84    Alzheimer Disease Sister         passed     Colon Cancer Brother 69    Lung Cancer Brother     Prostate Cancer Brother     Unknown/Adopted Maternal Grandmother     Unknown/Adopted Maternal Grandfather     Unknown/Adopted Paternal Grandmother     Unknown/Adopted Paternal Grandfather     Cancer Other         Colon-family h/o     History   Drug Use No         Review of Systems    Review of Systems  Constitutional, HEENT, cardiovascular, pulmonary, GI, , musculoskeletal, neuro, skin, endocrine and psych systems are negative, except as otherwise noted.    Objective    /58 (BP Location: Left arm, Patient Position: Sitting, Cuff Size: Adult Regular)   Pulse 55   Temp 97.7  F (36.5  C) (Tympanic)   Wt 74.8 kg (165 lb)   SpO2 96%   BMI 28.00 kg/m     Estimated body mass index is 28 kg/m  as calculated from the following:    Height as of  "4/11/24: 1.635 m (5' 4.37\").    Weight as of this encounter: 74.8 kg (165 lb).  Physical Exam  GENERAL: alert and no distress  EYES: Eyes grossly normal to inspection, PERRL and conjunctivae and sclerae normal  HENT: ear canals and TM's normal, nose and mouth without ulcers or lesions  NECK: no adenopathy, no asymmetry, masses, or scars  RESP: lungs clear to auscultation - no rales, rhonchi or wheezes  CV: regular rate and rhythm, normal S1 S2, no S3 or S4, no murmur, click or rub, no peripheral edema  ABDOMEN: soft, nontender, no hepatosplenomegaly, no masses and bowel sounds normal  MS: no gross musculoskeletal defects noted, no edema  SKIN: no suspicious lesions or rashes  NEURO: Normal strength and tone, mentation intact and speech normal  PSYCH: mentation appears normal, affect normal/bright    Recent Labs   Lab Test 04/11/24  0900 05/16/23  2123 03/29/23  1448 12/02/22  0851 11/22/22  1331   HGB  --  13.6  --   --  13.6   PLT  --  399  --   --  365    138  --    < > 138   POTASSIUM 4.2 4.2  --    < > 4.5   CR 0.69 0.86  --    < > 0.75   A1C 6.0*  --  5.9*   < >  --     < > = values in this interval not displayed.        Diagnostics  Recent Results (from the past 240 hour(s))   Extra Green Top (Lithium Heparin) Tube    Collection Time: 04/11/24  9:00 AM   Result Value Ref Range    Hold Specimen [facility]    Extra Purple Top Tube    Collection Time: 04/11/24  9:00 AM   Result Value Ref Range    Hold Specimen [facility]    Comprehensive metabolic panel    Collection Time: 04/11/24  9:00 AM   Result Value Ref Range    Sodium 145 135 - 145 mmol/L    Potassium 4.2 3.4 - 5.3 mmol/L    Carbon Dioxide (CO2) 24 22 - 29 mmol/L    Anion Gap 14 7 - 15 mmol/L    Urea Nitrogen 15.5 8.0 - 23.0 mg/dL    Creatinine 0.69 0.51 - 0.95 mg/dL    GFR Estimate 85 >60 mL/min/1.73m2    Calcium 9.5 8.8 - 10.2 mg/dL    Chloride 107 98 - 107 mmol/L    Glucose 105 (H) 70 - 99 mg/dL    Alkaline Phosphatase 54 40 - 150 U/L    AST 21 0 - 45 U/L    " ALT 18 0 - 50 U/L    Protein Total 6.8 6.4 - 8.3 g/dL    Albumin 4.3 3.5 - 5.2 g/dL    Bilirubin Total 0.5 <=1.2 mg/dL   Hemoglobin A1c    Collection Time: 04/11/24  9:00 AM   Result Value Ref Range    Estimated Average Glucose 126 mg/dL    Hemoglobin A1C 6.0 (H) <5.7 %   TSH with free T4 reflex    Collection Time: 04/11/24  9:00 AM   Result Value Ref Range    TSH 1.80 0.30 - 4.20 uIU/mL   Lipid Profile (Chol, Trig, HDL, LDL calc)    Collection Time: 04/11/24  9:00 AM   Result Value Ref Range    Cholesterol 154 <200 mg/dL    Triglycerides 145 <150 mg/dL    Direct Measure HDL 60 >=50 mg/dL    LDL Cholesterol Calculated 65 <=100 mg/dL    Non HDL Cholesterol 94 <130 mg/dL   CBC with platelets    Collection Time: 04/19/24  4:46 PM   Result Value Ref Range    WBC Count 6.0 4.0 - 11.0 10e3/uL    RBC Count 4.29 3.80 - 5.20 10e6/uL    Hemoglobin 12.6 11.7 - 15.7 g/dL    Hematocrit 38.8 35.0 - 47.0 %    MCV 90 78 - 100 fL    MCH 29.4 26.5 - 33.0 pg    MCHC 32.5 31.5 - 36.5 g/dL    RDW 13.7 10.0 - 15.0 %    Platelet Count 323 150 - 450 10e3/uL      No EKG required for low risk surgery (cataract, skin procedure, breast biopsy, etc).    Revised Cardiac Risk Index (RCRI)  The patient has the following serious cardiovascular risks for perioperative complications:   - No serious cardiac risks = 0 points     RCRI Interpretation: 0 points: Class I (very low risk - 0.4% complication rate)         Signed Electronically by: Baylee Rodríguez MD  Copy of this evaluation report is provided to requesting physician.

## 2024-04-19 NOTE — PROGRESS NOTES
Preoperative Evaluation  [facility] [place]  [address]  [place] [address]  Phone: [phone]  Primary Provider: Mere Rodríguez  Pre-op Performing Provider: MERE RODRÍGUEZ  Apr 19, 2024     Iris is a 84 year old, presenting for the following:  Pre-Op Exam        4/19/2024     4:36 PM   Additional Questions   Roomed by Skyler Mdconnell   Accompanied by Self         4/19/2024     4:36 PM   Patient Reported Additional Medications   Patient reports taking the following new medications none     Surgical Information  Surgery/Procedure: Phacoemulsification Cataract Extraction Posterior Chamber Lens Right Eye   Surgery Location: [facility] [place]  Surgeon: Kamron   Surgery Date: 4/22/2024  Time of Surgery: Unknown   Where patient plans to recover: At home with family  Fax number for surgical facility: Note does not need to be faxed, will be available electronically in Epic.    Assessment & Plan     The proposed surgical procedure is considered LOW risk.    Preop general physical exam  Cleared for surgery  - CBC with platelets; Future  - CBC with platelets    Cataract of both eyes, unspecified cataract type  Reason for surgery  - CBC with platelets; Future  - CBC with platelets    MILES (generalized anxiety disorder)  No side effects from celexa so far has been on it 3-4 days      - No identified additional risk factors other than previously addressed    Antiplatelet or Anticoagulation Medication Instructions   - Bleeding risk is low for this procedure (e.g. dental, skin, cataract).    Additional Medication Instructions  Patient is to take all scheduled medications on the day of surgery    Recommendation  APPROVAL GIVEN to proceed with proposed procedure, without further diagnostic evaluation.      Subjective   HPI related to upcoming procedure: bilateral cataracts        4/16/2024     9:54 AM   Preop Questions   1. Have you ever had a heart attack or stroke? No   2. Have you ever had surgery on your heart or  blood vessels, such as a stent placement, a coronary artery bypass, or surgery on an artery in your head, neck, heart, or legs? No   3. Do you have chest pain with activity? No   4. Do you have a history of  heart failure? No   5. Do you currently have a cold, bronchitis or symptoms of other infection? No   6. Do you have a cough, shortness of breath, or wheezing? No   7. Do you or anyone in your family have previous history of blood clots? YES - Mother  of pulmonary embolism in  after an amputation    8. Do you or does anyone in your family have a serious bleeding problem such as prolonged bleeding following surgeries or cuts? No   9. Have you ever had problems with anemia or been told to take iron pills? YES - Took iron pills a couple years ago    10. Have you had any abnormal blood loss such as black, tarry or bloody stools, or abnormal vaginal bleeding? No   11. Have you ever had a blood transfusion? No   12. Are you willing to have a blood transfusion if it is medically needed before, during, or after your surgery? Yes   13. Have you or any of your relatives ever had problems with anesthesia? No   14. Do you have sleep apnea, excessive snoring or daytime drowsiness? No   15. Do you have any artifical heart valves or other implanted medical devices like a pacemaker, defibrillator, or continuous glucose monitor? No   16. Do you have artificial joints? YES - bilateral hip replacement    17. Are you allergic to latex? YES: Unsure if she still is. Unsure of reaction        Health Care Directive  Patient does not have a Health Care Directive or Living Will: Patient states has Advance Directive and will bring in a copy to clinic.    Preoperative Review of    reviewed - no record of controlled substances prescribed.      Status of Chronic Conditions:  See problem list for active medical problems.  Problems all longstanding and stable, except as noted/documented.  See ROS for pertinent symptoms related to  these conditions.    Patient Active Problem List    Diagnosis Date Noted    Ventricular tachycardia, unspecified (H) 04/11/2024     Priority: Medium    Paroxysmal supraventricular tachycardia (H24) 08/09/2023     Priority: Medium    Ventricular trigeminy 08/09/2023     Priority: Medium    Transient alteration of awareness 08/09/2023     Priority: Medium    Ventricular ectopy 08/09/2023     Priority: Medium    Hyperlipidemia LDL goal <100 08/09/2023     Priority: Medium    Nodule of right lobe of thyroid gland 06/14/2023     Priority: Medium    High magnesium levels 05/24/2023     Priority: Medium    Syncope and collapse 05/24/2023     Priority: Medium    Episodic confusion 05/24/2023     Priority: Medium    Vision changes 05/24/2023     Priority: Medium    Gastroesophageal reflux disease, unspecified whether esophagitis present 03/29/2023     Priority: Medium    Iron deficiency 03/29/2023     Priority: Medium    Acute bilateral thoracic back pain 02/24/2023     Priority: Medium    Acute bilateral low back pain without sciatica 02/24/2023     Priority: Medium    Post-menopausal 01/23/2023     Priority: Medium    Right foot pain 01/23/2023     Priority: Medium    Caregiver stress 11/18/2022     Priority: Medium    Multiple joint pain 11/18/2022     Priority: Medium    Benign paroxysmal positional vertigo due to bilateral vestibular disorder 11/18/2022     Priority: Medium    Obesity without serious comorbidity, unspecified classification, unspecified obesity type 11/18/2022     Priority: Medium    Lymphocytic colitis 10/30/2020     Priority: Medium     [facility]      Age-related osteoporosis without current pathological fracture 11/08/2019     Priority: Medium    Benign essential hypertension 07/31/2016     Priority: Medium    Knee pain 03/28/2013     Priority: Medium    Elevated LFTs 03/28/2013     Priority: Medium    Sleep related leg cramps 03/28/2013     Priority: Medium    Preventative health care  03/28/2013     Priority: Medium    Elevated glucose 03/28/2013     Priority: Medium    Mammogram declined 03/28/2013     Priority: Medium    Advanced care planning/counseling discussion 05/21/2012     Priority: Medium      Past Medical History:   Diagnosis Date    Blood glucose elevated     BPPV (benign paroxysmal positional vertigo)     Cystic disease of ovaries     Essential hypertension 07/2006    Family history of malignant neoplasm of gastrointestinal tract 04/25/2003    colon cancer in mother    Lymphocytic colitis 10/30/2020    Dr Adams, [facility]    Osteoarthritis 1/20011    Right hip [facility], Dr Nelson    Osteoporosis     Other abnormal blood chemistry 09/20/2012    Paroxysmal supraventricular tachycardia (H24) 8/9/2023    psoriasis 04/21/2011    Pure hypercholesterolemia 11/22/1999    Symptomatic menopausal or female climacteric states 03/31/2003    Thyroid nodule 5/24/2023    Unspecified closed fracture of pelvis 09/20/2011    Urethral stricture      Past Surgical History:   Procedure Laterality Date    APPENDECTOMY  01/01/1944    AS REVISE TOTAL HIP REPLACEMENT Left 2018    Dr Whaley    CHOLECYSTECTOMY  01/01/1979    colonoscopies  1998, 2003, 2007, 2009, 9/18/2014    Colonic polyps. Repeat colonoscopy 2013    Declines further mammograms  01/01/2009    Left superior and inferior pubic rami fractures  01/01/2006    ORTHOPEDIC SURGERY Right 01/01/2011    total right hip. Dr. Nelson    PACs, PVCs, SVT, bradycardia on Holter Moniter  01/01/2009    Dr. Pace    Urethral dilation  01/01/2003    Urethral stricture     Current Outpatient Medications   Medication Sig Dispense Refill    alendronate (FOSAMAX) 70 MG tablet Take 1 tablet (70 mg) by mouth every 7 days 12 tablet 3    ASPIRIN 81 PO Take 81 mg by mouth daily      Calcium Carbonate-Vit D-Min (CALCIUM 1200 PO) Take  by mouth. Take 1 daily      citalopram (CELEXA) 10 MG tablet Take 1 tablet (10 mg) by mouth daily 30 tablet 1    famotidine (PEPCID) 40 MG tablet  Take 1 tablet (40 mg) by mouth daily 90 tablet 3    Lactobacillus (ACIDOPHILUS PO) 1 daily      metoprolol succinate ER (TOPROL XL) 50 MG 24 hr tablet Take 0.5 tablets (25 mg) by mouth daily 90 tablet 3    multivitamin (OCUVITE) TABS tablet Take 1 tablet by mouth daily 90 each 3    rosuvastatin (CRESTOR) 20 MG tablet Take 1 tablet (20 mg) by mouth daily 90 tablet 3       Allergies   Allergen Reactions    Nabumetone Rash    Acetaminophen Nausea     Lortab    Naproxen Nausea     Naprosyn    Rofecoxib      Upsets stomach  Vioxx    Hydrocodone Bitartrate Nausea     Lortab        Social History     Tobacco Use    Smoking status: Never    Smokeless tobacco: Never    Tobacco comments:     Was only a sort time  to    Substance Use Topics    Alcohol use: Yes     Comment: Wine occasionally when out to dinners     Family History   Problem Relation Age of Onset    Diabetes Mother 59         age 59 of pulmonar embolism  after amputation of leg due to diabetes    Alzheimer Disease Father 82    Coronary Artery Disease Sister         Heart attack    Colon Cancer Sister 84    Alzheimer Disease Sister         passed     Colon Cancer Brother 69    Lung Cancer Brother     Prostate Cancer Brother     Unknown/Adopted Maternal Grandmother     Unknown/Adopted Maternal Grandfather     Unknown/Adopted Paternal Grandmother     Unknown/Adopted Paternal Grandfather     Cancer Other         Colon-family h/o     History   Drug Use No         Review of Systems    Review of Systems  Constitutional, HEENT, cardiovascular, pulmonary, GI, , musculoskeletal, neuro, skin, endocrine and psych systems are negative, except as otherwise noted.    Objective    /58 (BP Location: Left arm, Patient Position: Sitting, Cuff Size: Adult Regular)   Pulse 55   Temp 97.7  F (36.5  C) (Tympanic)   Wt 74.8 kg (165 lb)   SpO2 96%   BMI 28.00 kg/m     Estimated body mass index is 28 kg/m  as calculated from the following:    Height as of  "4/11/24: 1.635 m (5' 4.37\").    Weight as of this encounter: 74.8 kg (165 lb).  Physical Exam  GENERAL: alert and no distress  EYES: Eyes grossly normal to inspection, PERRL and conjunctivae and sclerae normal  HENT: ear canals and TM's normal, nose and mouth without ulcers or lesions  NECK: no adenopathy, no asymmetry, masses, or scars  RESP: lungs clear to auscultation - no rales, rhonchi or wheezes  CV: regular rate and rhythm, normal S1 S2, no S3 or S4, no murmur, click or rub, no peripheral edema  ABDOMEN: soft, nontender, no hepatosplenomegaly, no masses and bowel sounds normal  MS: no gross musculoskeletal defects noted, no edema  SKIN: no suspicious lesions or rashes  NEURO: Normal strength and tone, mentation intact and speech normal  PSYCH: mentation appears normal, affect normal/bright    Recent Labs   Lab Test 04/11/24  0900 05/16/23  2123 03/29/23  1448 12/02/22  0851 11/22/22  1331   HGB  --  13.6  --   --  13.6   PLT  --  399  --   --  365    138  --    < > 138   POTASSIUM 4.2 4.2  --    < > 4.5   CR 0.69 0.86  --    < > 0.75   A1C 6.0*  --  5.9*   < >  --     < > = values in this interval not displayed.        Diagnostics  Recent Results (from the past 240 hour(s))   Extra Green Top (Lithium Heparin) Tube    Collection Time: 04/11/24  9:00 AM   Result Value Ref Range    Hold Specimen [facility]    Extra Purple Top Tube    Collection Time: 04/11/24  9:00 AM   Result Value Ref Range    Hold Specimen [facility]    Comprehensive metabolic panel    Collection Time: 04/11/24  9:00 AM   Result Value Ref Range    Sodium 145 135 - 145 mmol/L    Potassium 4.2 3.4 - 5.3 mmol/L    Carbon Dioxide (CO2) 24 22 - 29 mmol/L    Anion Gap 14 7 - 15 mmol/L    Urea Nitrogen 15.5 8.0 - 23.0 mg/dL    Creatinine 0.69 0.51 - 0.95 mg/dL    GFR Estimate 85 >60 mL/min/1.73m2    Calcium 9.5 8.8 - 10.2 mg/dL    Chloride 107 98 - 107 mmol/L    Glucose 105 (H) 70 - 99 mg/dL    Alkaline Phosphatase 54 40 - 150 U/L    AST 21 0 - 45 U/L    " ALT 18 0 - 50 U/L    Protein Total 6.8 6.4 - 8.3 g/dL    Albumin 4.3 3.5 - 5.2 g/dL    Bilirubin Total 0.5 <=1.2 mg/dL   Hemoglobin A1c    Collection Time: 04/11/24  9:00 AM   Result Value Ref Range    Estimated Average Glucose 126 mg/dL    Hemoglobin A1C 6.0 (H) <5.7 %   TSH with free T4 reflex    Collection Time: 04/11/24  9:00 AM   Result Value Ref Range    TSH 1.80 0.30 - 4.20 uIU/mL   Lipid Profile (Chol, Trig, HDL, LDL calc)    Collection Time: 04/11/24  9:00 AM   Result Value Ref Range    Cholesterol 154 <200 mg/dL    Triglycerides 145 <150 mg/dL    Direct Measure HDL 60 >=50 mg/dL    LDL Cholesterol Calculated 65 <=100 mg/dL    Non HDL Cholesterol 94 <130 mg/dL   CBC with platelets    Collection Time: 04/19/24  4:46 PM   Result Value Ref Range    WBC Count 6.0 4.0 - 11.0 10e3/uL    RBC Count 4.29 3.80 - 5.20 10e6/uL    Hemoglobin 12.6 11.7 - 15.7 g/dL    Hematocrit 38.8 35.0 - 47.0 %    MCV 90 78 - 100 fL    MCH 29.4 26.5 - 33.0 pg    MCHC 32.5 31.5 - 36.5 g/dL    RDW 13.7 10.0 - 15.0 %    Platelet Count 323 150 - 450 10e3/uL      No EKG required for low risk surgery (cataract, skin procedure, breast biopsy, etc).    Revised Cardiac Risk Index (RCRI)  The patient has the following serious cardiovascular risks for perioperative complications:   - No serious cardiac risks = 0 points     RCRI Interpretation: 0 points: Class I (very low risk - 0.4% complication rate)         Signed Electronically by: Baylee Rodríguez MD  Copy of this evaluation report is provided to requesting physician.

## 2024-04-19 NOTE — H&P (VIEW-ONLY)
Preoperative Evaluation  [facility] [place]  [address]  [place] [address]  Phone: [phone]  Primary Provider: Mere Rodríguez  Pre-op Performing Provider: MERE RODRÍGUEZ  Apr 19, 2024     Iris is a 84 year old, presenting for the following:  Pre-Op Exam        4/19/2024     4:36 PM   Additional Questions   Roomed by Skyler Mcdonnell   Accompanied by Self         4/19/2024     4:36 PM   Patient Reported Additional Medications   Patient reports taking the following new medications none     Surgical Information  Surgery/Procedure: Phacoemulsification Cataract Extraction Posterior Chamber Lens Right Eye   [place] [facility] [place]  [place]geon: Kamron   Surgery Date: 4/22/2024  Time of Surgery: Unknown   Where patient plans to recover: At home with family  Fax number for surgical facility: Note does not need to be faxed, will be available electronically in Epic.    Assessment & Plan     The proposed surgical procedure is considered LOW risk.    Preop general physical exam  Cleared for surgery  - CBC with platelets; Future  - CBC with platelets    Cataract of both eyes, unspecified cataract type  Reason for surgery  - CBC with platelets; Future  - CBC with platelets    MILES (generalized anxiety disorder)  No side effects from celexa so far has been on it 3-4 days      - No identified additional risk factors other than previously addressed    Antiplatelet or Anticoagulation Medication Instructions   - Bleeding risk is low for this procedure (e.g. dental, skin, cataract).    Additional Medication Instructions  Patient is to take all scheduled medications on the day of surgery    Recommendation  APPROVAL GIVEN to proceed with proposed procedure, without further diagnostic evaluation.      Subjective   HPI related to upcoming procedure: bilateral cataracts        4/16/2024     9:54 AM   Preop Questions   1. Have you ever had a heart attack or stroke? No   2. Have you ever had surgery on your heart or  blood vessels, such as a stent placement, a coronary artery bypass, or surgery on an artery in your head, neck, heart, or legs? No   3. Do you have chest pain with activity? No   4. Do you have a history of  heart failure? No   5. Do you currently have a cold, bronchitis or symptoms of other infection? No   6. Do you have a cough, shortness of breath, or wheezing? No   7. Do you or anyone in your family have previous history of blood clots? YES - Mother  of pulmonary embolism in  after an amputation    8. Do you or does anyone in your family have a serious bleeding problem such as prolonged bleeding following surgeries or cuts? No   9. Have you ever had problems with anemia or been told to take iron pills? YES - Took iron pills a couple years ago    10. Have you had any abnormal blood loss such as black, tarry or bloody stools, or abnormal vaginal bleeding? No   11. Have you ever had a blood transfusion? No   12. Are you willing to have a blood transfusion if it is medically needed before, during, or after your surgery? Yes   13. Have you or any of your relatives ever had problems with anesthesia? No   14. Do you have sleep apnea, excessive snoring or daytime drowsiness? No   15. Do you have any artifical heart valves or other implanted medical devices like a pacemaker, defibrillator, or continuous glucose monitor? No   16. Do you have artificial joints? YES - bilateral hip replacement    17. Are you allergic to latex? YES: Unsure if she still is. Unsure of reaction        Health Care Directive  Patient does not have a Health Care Directive or Living Will: Patient states has Advance Directive and will bring in a copy to clinic.    Preoperative Review of    reviewed - no record of controlled substances prescribed.      Status of Chronic Conditions:  See problem list for active medical problems.  Problems all longstanding and stable, except as noted/documented.  See ROS for pertinent symptoms related to  these conditions.    Patient Active Problem List    Diagnosis Date Noted    Ventricular tachycardia, unspecified (H) 04/11/2024     Priority: Medium    Paroxysmal supraventricular tachycardia (H24) 08/09/2023     Priority: Medium    Ventricular trigeminy 08/09/2023     Priority: Medium    Transient alteration of awareness 08/09/2023     Priority: Medium    Ventricular ectopy 08/09/2023     Priority: Medium    Hyperlipidemia LDL goal <100 08/09/2023     Priority: Medium    Nodule of right lobe of thyroid gland 06/14/2023     Priority: Medium    High magnesium levels 05/24/2023     Priority: Medium    Syncope and collapse 05/24/2023     Priority: Medium    Episodic confusion 05/24/2023     Priority: Medium    Vision changes 05/24/2023     Priority: Medium    Gastroesophageal reflux disease, unspecified whether esophagitis present 03/29/2023     Priority: Medium    Iron deficiency 03/29/2023     Priority: Medium    Acute bilateral thoracic back pain 02/24/2023     Priority: Medium    Acute bilateral low back pain without sciatica 02/24/2023     Priority: Medium    Post-menopausal 01/23/2023     Priority: Medium    Right foot pain 01/23/2023     Priority: Medium    Caregiver stress 11/18/2022     Priority: Medium    Multiple joint pain 11/18/2022     Priority: Medium    Benign paroxysmal positional vertigo due to bilateral vestibular disorder 11/18/2022     Priority: Medium    Obesity without serious comorbidity, unspecified classification, unspecified obesity type 11/18/2022     Priority: Medium    Lymphocytic colitis 10/30/2020     Priority: Medium     [facility]      Age-related osteoporosis without current pathological fracture 11/08/2019     Priority: Medium    Benign essential hypertension 07/31/2016     Priority: Medium    Knee pain 03/28/2013     Priority: Medium    Elevated LFTs 03/28/2013     Priority: Medium    Sleep related leg cramps 03/28/2013     Priority: Medium    Preventative health care  03/28/2013     Priority: Medium    Elevated glucose 03/28/2013     Priority: Medium    Mammogram declined 03/28/2013     Priority: Medium    Advanced care planning/counseling discussion 05/21/2012     Priority: Medium      Past Medical History:   Diagnosis Date    Blood glucose elevated     BPPV (benign paroxysmal positional vertigo)     Cystic disease of ovaries     Essential hypertension 07/2006    Family history of malignant neoplasm of gastrointestinal tract 04/25/2003    colon cancer in mother    Lymphocytic colitis 10/30/2020    Dr Adams, [facility]    Osteoarthritis 1/20011    Right hip [facility], Dr Nelson    Osteoporosis     Other abnormal blood chemistry 09/20/2012    Paroxysmal supraventricular tachycardia (H24) 8/9/2023    psoriasis 04/21/2011    Pure hypercholesterolemia 11/22/1999    Symptomatic menopausal or female climacteric states 03/31/2003    Thyroid nodule 5/24/2023    Unspecified closed fracture of pelvis 09/20/2011    Urethral stricture      Past Surgical History:   Procedure Laterality Date    APPENDECTOMY  01/01/1944    AS REVISE TOTAL HIP REPLACEMENT Left 2018    Dr Whaley    CHOLECYSTECTOMY  01/01/1979    colonoscopies  1998, 2003, 2007, 2009, 9/18/2014    Colonic polyps. Repeat colonoscopy 2013    Declines further mammograms  01/01/2009    Left superior and inferior pubic rami fractures  01/01/2006    ORTHOPEDIC SURGERY Right 01/01/2011    total right hip. Dr. Nelson    PACs, PVCs, SVT, bradycardia on Holter Moniter  01/01/2009    Dr. Pace    Urethral dilation  01/01/2003    Urethral stricture     Current Outpatient Medications   Medication Sig Dispense Refill    alendronate (FOSAMAX) 70 MG tablet Take 1 tablet (70 mg) by mouth every 7 days 12 tablet 3    ASPIRIN 81 PO Take 81 mg by mouth daily      Calcium Carbonate-Vit D-Min (CALCIUM 1200 PO) Take  by mouth. Take 1 daily      citalopram (CELEXA) 10 MG tablet Take 1 tablet (10 mg) by mouth daily 30 tablet 1    famotidine (PEPCID) 40 MG tablet  Take 1 tablet (40 mg) by mouth daily 90 tablet 3    Lactobacillus (ACIDOPHILUS PO) 1 daily      metoprolol succinate ER (TOPROL XL) 50 MG 24 hr tablet Take 0.5 tablets (25 mg) by mouth daily 90 tablet 3    multivitamin (OCUVITE) TABS tablet Take 1 tablet by mouth daily 90 each 3    rosuvastatin (CRESTOR) 20 MG tablet Take 1 tablet (20 mg) by mouth daily 90 tablet 3       Allergies   Allergen Reactions    Nabumetone Rash    Acetaminophen Nausea     Lortab    Naproxen Nausea     Naprosyn    Rofecoxib      Upsets stomach  Vioxx    Hydrocodone Bitartrate Nausea     Lortab        Social History     Tobacco Use    Smoking status: Never    Smokeless tobacco: Never    Tobacco comments:     Was only a sort time  to    Substance Use Topics    Alcohol use: Yes     Comment: Wine occasionally when out to dinners     Family History   Problem Relation Age of Onset    Diabetes Mother 59         age 59 of pulmonar embolism  after amputation of leg due to diabetes    Alzheimer Disease Father 82    Coronary Artery Disease Sister         Heart attack    Colon Cancer Sister 84    Alzheimer Disease Sister         passed     Colon Cancer Brother 69    Lung Cancer Brother     Prostate Cancer Brother     Unknown/Adopted Maternal Grandmother     Unknown/Adopted Maternal Grandfather     Unknown/Adopted Paternal Grandmother     Unknown/Adopted Paternal Grandfather     Cancer Other         Colon-family h/o     History   Drug Use No         Review of Systems    Review of Systems  Constitutional, HEENT, cardiovascular, pulmonary, GI, , musculoskeletal, neuro, skin, endocrine and psych systems are negative, except as otherwise noted.    Objective    /58 (BP Location: Left arm, Patient Position: Sitting, Cuff Size: Adult Regular)   Pulse 55   Temp 97.7  F (36.5  C) (Tympanic)   Wt 74.8 kg (165 lb)   SpO2 96%   BMI 28.00 kg/m     Estimated body mass index is 28 kg/m  as calculated from the following:    Height as of  "4/11/24: 1.635 m (5' 4.37\").    Weight as of this encounter: 74.8 kg (165 lb).  Physical Exam  GENERAL: alert and no distress  EYES: Eyes grossly normal to inspection, PERRL and conjunctivae and sclerae normal  HENT: ear canals and TM's normal, nose and mouth without ulcers or lesions  NECK: no adenopathy, no asymmetry, masses, or scars  RESP: lungs clear to auscultation - no rales, rhonchi or wheezes  CV: regular rate and rhythm, normal S1 S2, no S3 or S4, no murmur, click or rub, no peripheral edema  ABDOMEN: soft, nontender, no hepatosplenomegaly, no masses and bowel sounds normal  MS: no gross musculoskeletal defects noted, no edema  SKIN: no suspicious lesions or rashes  NEURO: Normal strength and tone, mentation intact and speech normal  PSYCH: mentation appears normal, affect normal/bright    Recent Labs   Lab Test 04/11/24  0900 05/16/23  2123 03/29/23  1448 12/02/22  0851 11/22/22  1331   HGB  --  13.6  --   --  13.6   PLT  --  399  --   --  365    138  --    < > 138   POTASSIUM 4.2 4.2  --    < > 4.5   CR 0.69 0.86  --    < > 0.75   A1C 6.0*  --  5.9*   < >  --     < > = values in this interval not displayed.        Diagnostics  Recent Results (from the past 240 hour(s))   Extra Green Top (Lithium Heparin) Tube    Collection Time: 04/11/24  9:00 AM   Result Value Ref Range    Hold Specimen [facility]    Extra Purple Top Tube    Collection Time: 04/11/24  9:00 AM   Result Value Ref Range    Hold Specimen [facility]    Comprehensive metabolic panel    Collection Time: 04/11/24  9:00 AM   Result Value Ref Range    Sodium 145 135 - 145 mmol/L    Potassium 4.2 3.4 - 5.3 mmol/L    Carbon Dioxide (CO2) 24 22 - 29 mmol/L    Anion Gap 14 7 - 15 mmol/L    Urea Nitrogen 15.5 8.0 - 23.0 mg/dL    Creatinine 0.69 0.51 - 0.95 mg/dL    GFR Estimate 85 >60 mL/min/1.73m2    Calcium 9.5 8.8 - 10.2 mg/dL    Chloride 107 98 - 107 mmol/L    Glucose 105 (H) 70 - 99 mg/dL    Alkaline Phosphatase 54 40 - 150 U/L    AST 21 0 - 45 U/L    " ALT 18 0 - 50 U/L    Protein Total 6.8 6.4 - 8.3 g/dL    Albumin 4.3 3.5 - 5.2 g/dL    Bilirubin Total 0.5 <=1.2 mg/dL   Hemoglobin A1c    Collection Time: 04/11/24  9:00 AM   Result Value Ref Range    Estimated Average Glucose 126 mg/dL    Hemoglobin A1C 6.0 (H) <5.7 %   TSH with free T4 reflex    Collection Time: 04/11/24  9:00 AM   Result Value Ref Range    TSH 1.80 0.30 - 4.20 uIU/mL   Lipid Profile (Chol, Trig, HDL, LDL calc)    Collection Time: 04/11/24  9:00 AM   Result Value Ref Range    Cholesterol 154 <200 mg/dL    Triglycerides 145 <150 mg/dL    Direct Measure HDL 60 >=50 mg/dL    LDL Cholesterol Calculated 65 <=100 mg/dL    Non HDL Cholesterol 94 <130 mg/dL   CBC with platelets    Collection Time: 04/19/24  4:46 PM   Result Value Ref Range    WBC Count 6.0 4.0 - 11.0 10e3/uL    RBC Count 4.29 3.80 - 5.20 10e6/uL    Hemoglobin 12.6 11.7 - 15.7 g/dL    Hematocrit 38.8 35.0 - 47.0 %    MCV 90 78 - 100 fL    MCH 29.4 26.5 - 33.0 pg    MCHC 32.5 31.5 - 36.5 g/dL    RDW 13.7 10.0 - 15.0 %    Platelet Count 323 150 - 450 10e3/uL      No EKG required for low risk surgery (cataract, skin procedure, breast biopsy, etc).    Revised Cardiac Risk Index (RCRI)  The patient has the following serious cardiovascular risks for perioperative complications:   - No serious cardiac risks = 0 points     RCRI Interpretation: 0 points: Class I (very low risk - 0.4% complication rate)         Signed Electronically by: Baylee Rodríguez MD  Copy of this evaluation report is provided to requesting physician.

## 2024-04-22 ENCOUNTER — HOSPITAL ENCOUNTER (OUTPATIENT)
Facility: HOSPITAL | Age: 84
Discharge: HOME | End: 2024-04-22
Attending: OPHTHALMOLOGY | Admitting: OPHTHALMOLOGY
Payer: MEDICARE

## 2024-04-22 ENCOUNTER — ANESTHESIA (OUTPATIENT)
Dept: SURGERY | Facility: HOSPITAL | Age: 84
End: 2024-04-22
Payer: MEDICARE

## 2024-04-22 VITALS
SYSTOLIC BLOOD PRESSURE: 140 MMHG | BODY MASS INDEX: 29.23 KG/M2 | TEMPERATURE: 97 F | HEART RATE: 57 BPM | HEIGHT: 63 IN | RESPIRATION RATE: 18 BRPM | OXYGEN SATURATION: 97 % | WEIGHT: 165 LBS | DIASTOLIC BLOOD PRESSURE: 83 MMHG

## 2024-04-22 PROCEDURE — V2632 POST CHMBR INTRAOCULAR LENS: HCPCS | Performed by: OPHTHALMOLOGY

## 2024-04-22 PROCEDURE — 370N000017 HC ANESTHESIA TECHNICAL FEE, PER MIN: Performed by: OPHTHALMOLOGY

## 2024-04-22 PROCEDURE — 999N000141 HC STATISTIC PRE-PROCEDURE NURSING ASSESSMENT: Performed by: OPHTHALMOLOGY

## 2024-04-22 PROCEDURE — 66984 XCAPSL CTRC RMVL W/O ECP: CPT | Performed by: NURSE ANESTHETIST, CERTIFIED REGISTERED

## 2024-04-22 PROCEDURE — 710N000012 HC RECOVERY PHASE 2, PER MINUTE: Performed by: OPHTHALMOLOGY

## 2024-04-22 PROCEDURE — 272N000001 HC OR GENERAL SUPPLY STERILE: Performed by: OPHTHALMOLOGY

## 2024-04-22 PROCEDURE — 250N000011 HC RX IP 250 OP 636: Performed by: OPHTHALMOLOGY

## 2024-04-22 PROCEDURE — 99100 ANES PT EXTEME AGE<1 YR&>70: CPT | Performed by: NURSE ANESTHETIST, CERTIFIED REGISTERED

## 2024-04-22 PROCEDURE — 250N000011 HC RX IP 250 OP 636: Performed by: NURSE ANESTHETIST, CERTIFIED REGISTERED

## 2024-04-22 PROCEDURE — 250N000009 HC RX 250: Performed by: OPHTHALMOLOGY

## 2024-04-22 PROCEDURE — 360N000076 HC SURGERY LEVEL 3, PER MIN: Performed by: OPHTHALMOLOGY

## 2024-04-22 DEVICE — LENS-SOFPORT 19.0: Type: IMPLANTABLE DEVICE | Site: EYE | Status: FUNCTIONAL

## 2024-04-22 RX ORDER — PROPARACAINE HYDROCHLORIDE 5 MG/ML
1 SOLUTION/ DROPS OPHTHALMIC ONCE
Status: COMPLETED | OUTPATIENT
Start: 2024-04-22 | End: 2024-04-22

## 2024-04-22 RX ORDER — MOXIFLOXACIN 5 MG/ML
SOLUTION/ DROPS OPHTHALMIC PRN
Status: DISCONTINUED | OUTPATIENT
Start: 2024-04-22 | End: 2024-04-22 | Stop reason: HOSPADM

## 2024-04-22 RX ORDER — CYCLOPENTOLAT/TROPIC/PHENYLEPH 1%-1%-2.5%
1 DROPS (EA) OPHTHALMIC (EYE)
Status: COMPLETED | OUTPATIENT
Start: 2024-04-22 | End: 2024-04-22

## 2024-04-22 RX ORDER — LIDOCAINE HYDROCHLORIDE 10 MG/ML
INJECTION, SOLUTION EPIDURAL; INFILTRATION; INTRACAUDAL; PERINEURAL PRN
Status: DISCONTINUED | OUTPATIENT
Start: 2024-04-22 | End: 2024-04-22 | Stop reason: HOSPADM

## 2024-04-22 RX ORDER — CYCLOPENTOLATE HYDROCHLORIDE 20 MG/ML
1 SOLUTION/ DROPS OPHTHALMIC ONCE
Status: COMPLETED | OUTPATIENT
Start: 2024-04-22 | End: 2024-04-22

## 2024-04-22 RX ORDER — TETRACAINE HYDROCHLORIDE 5 MG/ML
SOLUTION OPHTHALMIC PRN
Status: DISCONTINUED | OUTPATIENT
Start: 2024-04-22 | End: 2024-04-22 | Stop reason: HOSPADM

## 2024-04-22 RX ORDER — PHENYLEPHRINE HYDROCHLORIDE 100 MG/ML
1 SOLUTION/ DROPS OPHTHALMIC
Status: COMPLETED | OUTPATIENT
Start: 2024-04-22 | End: 2024-04-22

## 2024-04-22 RX ORDER — PROPARACAINE HYDROCHLORIDE 5 MG/ML
1 SOLUTION/ DROPS OPHTHALMIC
Status: COMPLETED | OUTPATIENT
Start: 2024-04-22 | End: 2024-04-22

## 2024-04-22 RX ORDER — LIDOCAINE 40 MG/G
CREAM TOPICAL
Status: DISCONTINUED | OUTPATIENT
Start: 2024-04-22 | End: 2024-04-22 | Stop reason: HOSPADM

## 2024-04-22 RX ORDER — PILOCARPINE HYDROCHLORIDE 10 MG/ML
SOLUTION/ DROPS OPHTHALMIC PRN
Status: DISCONTINUED | OUTPATIENT
Start: 2024-04-22 | End: 2024-04-22 | Stop reason: HOSPADM

## 2024-04-22 RX ORDER — ONDANSETRON 4 MG/1
4 TABLET, ORALLY DISINTEGRATING ORAL EVERY 30 MIN PRN
Status: DISCONTINUED | OUTPATIENT
Start: 2024-04-22 | End: 2024-04-22 | Stop reason: HOSPADM

## 2024-04-22 RX ORDER — LIDOCAINE HYDROCHLORIDE 20 MG/ML
JELLY TOPICAL ONCE
Status: COMPLETED | OUTPATIENT
Start: 2024-04-22 | End: 2024-04-22

## 2024-04-22 RX ORDER — ACETAMINOPHEN 325 MG/1
650 TABLET ORAL
Status: DISCONTINUED | OUTPATIENT
Start: 2024-04-22 | End: 2024-04-22 | Stop reason: HOSPADM

## 2024-04-22 RX ORDER — NALOXONE HYDROCHLORIDE 0.4 MG/ML
0.1 INJECTION, SOLUTION INTRAMUSCULAR; INTRAVENOUS; SUBCUTANEOUS
Status: DISCONTINUED | OUTPATIENT
Start: 2024-04-22 | End: 2024-04-22 | Stop reason: HOSPADM

## 2024-04-22 RX ORDER — MOXIFLOXACIN 5 MG/ML
1 SOLUTION/ DROPS OPHTHALMIC
Status: COMPLETED | OUTPATIENT
Start: 2024-04-22 | End: 2024-04-22

## 2024-04-22 RX ORDER — ONDANSETRON 2 MG/ML
4 INJECTION INTRAMUSCULAR; INTRAVENOUS EVERY 30 MIN PRN
Status: DISCONTINUED | OUTPATIENT
Start: 2024-04-22 | End: 2024-04-22 | Stop reason: HOSPADM

## 2024-04-22 RX ADMIN — LIDOCAINE HYDROCHLORIDE: 20 JELLY TOPICAL at 08:35

## 2024-04-22 RX ADMIN — CYCLOPENTOLATE HYDROCHLORIDE 1 DROP: 20 SOLUTION/ DROPS OPHTHALMIC at 08:04

## 2024-04-22 RX ADMIN — Medication 1 DROP: at 08:12

## 2024-04-22 RX ADMIN — Medication 1 DROP: at 08:33

## 2024-04-22 RX ADMIN — PHENYLEPHRINE HYDROCHLORIDE 1 DROP: 100 SOLUTION/ DROPS OPHTHALMIC at 08:43

## 2024-04-22 RX ADMIN — MOXIFLOXACIN OPHTHALMIC SOLUTION 1 DROP: 5 SOLUTION/ DROPS OPHTHALMIC at 08:23

## 2024-04-22 RX ADMIN — MOXIFLOXACIN OPHTHALMIC SOLUTION 1 DROP: 5 SOLUTION/ DROPS OPHTHALMIC at 08:28

## 2024-04-22 RX ADMIN — MIDAZOLAM 1 MG: 1 INJECTION INTRAMUSCULAR; INTRAVENOUS at 10:39

## 2024-04-22 RX ADMIN — PROPARACAINE HYDROCHLORIDE 1 DROP: 5 SOLUTION/ DROPS OPHTHALMIC at 08:31

## 2024-04-22 RX ADMIN — PROPARACAINE HYDROCHLORIDE 1 DROP: 5 SOLUTION/ DROPS OPHTHALMIC at 08:01

## 2024-04-22 RX ADMIN — MOXIFLOXACIN OPHTHALMIC SOLUTION 1 DROP: 5 SOLUTION/ DROPS OPHTHALMIC at 08:17

## 2024-04-22 ASSESSMENT — ACTIVITIES OF DAILY LIVING (ADL)
ADLS_ACUITY_SCORE: 26

## 2024-04-22 NOTE — ANESTHESIA POSTPROCEDURE EVALUATION
Patient: Iris Ellis    Procedure: Procedure(s):  Phacoemulsification Cataract Extraction Posterior chamber Lens Right Eye       Anesthesia Type:  MAC    Note:  Disposition: Outpatient   Postop Pain Control: Uneventful            Sign Out: Well controlled pain   PONV: No   Neuro/Psych: Uneventful            Sign Out: Acceptable/Baseline neuro status   Airway/Respiratory: Uneventful            Sign Out: Acceptable/Baseline resp. status   CV/Hemodynamics: Uneventful            Sign Out: Acceptable CV status; No obvious hypovolemia; No obvious fluid overload   Other NRE: NONE   DID A NON-ROUTINE EVENT OCCUR? No         Last vitals:  Vitals Value Taken Time   /83 04/22/24 1120   Temp 97  F (36.1  C) 04/22/24 1120   Pulse 57 04/22/24 1120   Resp 18 04/22/24 1120   SpO2 97 % 04/22/24 1115   Vitals shown include unfiled device data.    Electronically Signed By: JOY Arnold CRNA  April 22, 2024  11:40 AM

## 2024-04-22 NOTE — OR NURSING
Patient and responsible adult given discharge instructions with no questions regarding instructions. Sina score 20. Pain level 0/10.  Discharged from unit via ambulation. Patient discharged to home with son..

## 2024-04-22 NOTE — OP NOTE
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  Lidocaine 2% jelly topically and Lidocaine 1% preservative-free intracamerally.     PROCEDURE:  The patient was brought to the Operating Room and prepped and draped in a sterile manner.  A wire lid speculum was placed.  A paracentesis was created and 1% Lidocaine was instilled in the anterior chamber.  The anterior chamber was then filled with Amvisc viscoelastic.  A clear cornea temporal wound was created using a 2.8 mm keratome.  A cystotome was used to initiate a flap in the anterior capsule and a Utrata forceps was used to create a continuous tear capsulorhexis.  Hydrodissection was performed.  The phacoemulsification tip was inserted into the eye and the nucleus and epinucleus were removed using a divide and conquer technique.  The residual cortex was removed using the I/A handpiece.  The anterior chamber was then refilled with viscoelastic and the wound was enlarged with the keratome.  The intraocular lens, 19 diopter, Model LI61AO, was placed into the injector and injected into the capsular bag. It was checked to make sure that it was central and stable.  Residual viscoelastic was removed using the I/A.  The anterior chamber was refilled with BSS.  The wounds were hydrated with BSS and were noted to be watertight with no suture necessary.  Topical pilocarpine 1%, Betagan, and Vigamox was applied.  A hard shield was placed.     The  patient tolerated the procedure well and was sent to the Recovery Room in satisfactory condition.

## 2024-04-22 NOTE — ANESTHESIA CARE TRANSFER NOTE
Patient: Iris Ellis    Procedure: Procedure(s):  Phacoemulsification Cataract Extraction Posterior chamber Lens Right Eye       Diagnosis: Combined form of age-related cataract, right eye [H25.811]  Diagnosis Additional Information: No value filed.    Anesthesia Type:   MAC     Note:    Oropharynx: spontaneously breathing  Level of Consciousness: awake  Oxygen Supplementation: room air    Independent Airway: airway patency satisfactory and stable  Dentition: dentition unchanged  Vital Signs Stable: post-procedure vital signs reviewed and stable  Report to RN Given: handoff report given  Patient transferred to: Phase II    Handoff Report: Identifed the Patient, Identified the Reponsible Provider, Reviewed the pertinent medical history, Discussed the surgical course, Reviewed Intra-OP anesthesia mangement and issues during anesthesia, Set expectations for post-procedure period and Allowed opportunity for questions and acknowledgement of understanding    Vitals:  Vitals Value Taken Time   /83 04/22/24 1120   Temp 97  F (36.1  C) 04/22/24 1106   Pulse 57 04/22/24 1120   Resp 18 04/22/24 1106   SpO2 97 % 04/22/24 1115   Vitals shown include unfiled device data.    Electronically Signed By: JOY Arnold CRNA  April 22, 2024  11:24 AM

## 2024-04-22 NOTE — DISCHARGE INSTRUCTIONS
After Anesthesia (Sleep Medicine)  What should I do after anesthesia?  You should rest and relax for the next 24 hours. Avoid risky or difficult (strenuous) activity. A responsible adult should stay with you overnight.  Don't drive or use any heavy equipment for 24 hours. Even if you feel normal, your reactions may be affected by the sleep medicine given to you.  Don't drink alcohol or make any important decisions for 24 hours.  Slowly get back to your regular diet, as you feel able.  How should I expect to feel?  It's normal to feel dizzy, light-headed, or faint for up to a full day after anesthesia or while taking pain medicine. If this happens:   Sit down for a few minutes before standing.  Have someone help you when you get up to walk or use the bathroom.  If you have nausea (feel sick to your stomach) or vomit (throw up):   Drink clear liquids (such as apple juice, ginger ale, broth, or 7UP) until you feel better.  If you feel sick to your stomach, or you keep vomiting for 24 hours, please call the doctor.  What else should I know?  You might have a dry mouth, sore throat, muscle aches, or trouble sleeping. These should go away after 24 hours.  Please contact your doctor if you have any other symptoms that concern you, such as fever, pain, bleeding, fluid drainage, swelling, or headache, or if it's been over 8 to 10 hours and you still aren't able to pee (urinate).  If you have a history of sleep apnea, it's very important to use your CPAP machine for the next 24 hours when you nap or sleep.   For informational purposes only. Not to replace the advice of your health care provider. Copyright   2023 [employer]. All rights reserved. Clinically reviewed by Waylon King MD. [employer] [id number] - REV 09/23.

## 2024-04-22 NOTE — INTERVAL H&P NOTE
I have reviewed the surgical (or preoperative) H&P that is linked to this encounter, and examined the patient. There are no significant changes.  Benji Lundy MD      Clinical Conditions Present on Arrival:  Clinically Significant Risk Factors Present on Admission

## 2024-04-23 ENCOUNTER — TRANSFERRED RECORDS (OUTPATIENT)
Dept: HEALTH INFORMATION MANAGEMENT | Facility: CLINIC | Age: 84
End: 2024-04-23

## 2024-04-25 ENCOUNTER — ALLIED HEALTH/NURSE VISIT (OUTPATIENT)
Dept: AUDIOLOGY | Facility: OTHER | Age: 84
End: 2024-04-25
Attending: AUDIOLOGIST
Payer: MEDICARE

## 2024-04-25 DIAGNOSIS — H91.93 DECREASED HEARING OF BOTH EARS: Primary | ICD-10-CM

## 2024-04-25 PROCEDURE — V5299 HEARING SERVICE: HCPCS

## 2024-04-25 NOTE — PROGRESS NOTES
HEARING AID CHECK    BACKGROUND:  Iris Ellis, a 84 year old female, was seen today for an hearing aid check.  Ms. Ellis wears [employer] Real 3 miniRITE R hearing aids.  Ms. Ellis reported right aid not working.    FINDINGS:  Visual inspection and cleaning provided. No function on listening check.    Reviewed cleaning, troubleshooting, and preventative care with patient. Any cleaning tools used were provided as customer courtesy.    Services performed and warranty reviewed with patient.    PLAN:  Based on patient report, no audiology appointment for adjustments needed.Ms. Ellis's right aid sent out for repair under warranty. Patient will be notified when aid comes back from repair. Patient had no further questions and reports satisfaction.         Chiqui Harrell  Audiology Assistant  [facility]  [phone]

## 2024-05-07 ENCOUNTER — ANESTHESIA EVENT (OUTPATIENT)
Dept: SURGERY | Facility: HOSPITAL | Age: 84
End: 2024-05-07
Payer: MEDICARE

## 2024-05-07 ASSESSMENT — ENCOUNTER SYMPTOMS: DYSRHYTHMIAS: 1

## 2024-05-07 NOTE — ANESTHESIA PREPROCEDURE EVALUATION
Anesthesia Pre-Procedure Evaluation    Patient: Iris Ellis   MRN: [id number] : 1940        Procedure : Procedure(s):  Phacoemulsification Cataract Extraction posterior Chamber Lens Left Eye          Past Medical History:   Diagnosis Date     Blood glucose elevated      BPPV (benign paroxysmal positional vertigo)      Cystic disease of ovaries      Essential hypertension 2006     Family history of malignant neoplasm of gastrointestinal tract 2003    colon cancer in mother     Lymphocytic colitis 10/30/2020    Dr Adams, [facility]     Osteoarthritis     Right hip LYNN, Dr Nelson     Osteoporosis      Other abnormal blood chemistry 2012     Paroxysmal supraventricular tachycardia (H24) 2023     psoriasis 2011     Pure hypercholesterolemia 1999     Symptomatic menopausal or female climacteric states 2003     Thyroid nodule 2023     Unspecified closed fracture of pelvis 2011     Urethral stricture       Past Surgical History:   Procedure Laterality Date     APPENDECTOMY  1944     AS REVISE TOTAL HIP REPLACEMENT Left 2018    Dr Whaley     CHOLECYSTECTOMY  1979     colonoscopies  , , , , 2014    Colonic polyps. Repeat colonoscopy 2013     Declines further mammograms  2009     Left superior and inferior pubic rami fractures  2006     ORTHOPEDIC SURGERY Right 2011    total right hip. Dr. Nelson     PACs, PVCs, SVT, bradycardia on Holter Moniter  2009    Dr. Pace     PHACOEMULSIFICATION WITH STANDARD INTRAOCULAR LENS IMPLANT Right 2024    Procedure: Phacoemulsification Cataract Extraction Posterior chamber Lens Right Eye;  Surgeon: Benji Lundy MD;  Location: [facility]     Urethral dilation  2003    Urethral stricture      Allergies   Allergen Reactions     Nabumetone Rash     Acetaminophen Nausea     Lortab     Naproxen Nausea     Naprosyn     Rofecoxib      Upsets stomach  Vioxx      Hydrocodone Bitartrate Nausea     Lortab      Social History     Tobacco Use     Smoking status: Never     Smokeless tobacco: Never     Tobacco comments:     Was only a sort time  to    Substance Use Topics     Alcohol use: Yes     Comment: Wine occasionally when out to dinners      Wt Readings from Last 1 Encounters:   24 74.8 kg (165 lb)        Anesthesia Evaluation   Pt has had prior anesthetic. Type: MAC and General.    No history of anesthetic complications       ROS/MED HX  ENT/Pulmonary:       Neurologic: Comment: Episodic confusion      Cardiovascular: Comment: BPPV (benign paroxysmal positional vertigo)    (+) Dyslipidemia hypertension- -   -  - -                        dysrhythmias (SVT, PVCs SVT, bradycardia, Ventricular ectopy Ventricular tachycardia, unspecified (H) Ventricular trigeminy), Other,             METS/Exercise Tolerance:     Hematologic: Comments: Iron deficiency      Pt's mother  of PE after amputation in       Musculoskeletal: Comment: History of closed fracture of pelvis  Osteoporosis  bilateral low back pain without sciatica  bilateral thoracic back pain      (+)  arthritis,             GI/Hepatic: Comment: Family history of malignant neoplasm of gastrointestinal tract  Lymphocytic colitis    (+) GERD, Asymptomatic on medication,           liver disease (elevated LFTs),       Renal/Genitourinary: Comment: Cystic disease of ovaries  Symptomatic menopausal or female climacteric states  Urethral stricture      Endo: Comment: Blood glucose elevated    (+)  type II DM (24), Last HgA1c: 6.0, date: 24,      thyroid problem,     Obesity,       Psychiatric/Substance Use:     (+) psychiatric history anxiety       Infectious Disease:       Malignancy:       Other: Comment: psoriasis           Physical Exam    Airway  airway exam normal      Mallampati: II   TM distance: > 3 FB   Neck ROM: full   Mouth opening: > 3 cm    Respiratory Devices and Support         Dental   "     (+) Minor Abnormalities - some fillings, tiny chips      Cardiovascular   cardiovascular exam normal       Rhythm and rate: regular and normal     Pulmonary   pulmonary exam normal        breath sounds clear to auscultation       OUTSIDE LABS:  CBC:   Lab Results   Component Value Date    WBC 6.0 04/19/2024    WBC 7.4 05/16/2023    HGB 12.6 04/19/2024    HGB 13.6 05/16/2023    HCT 38.8 04/19/2024    HCT 42.8 05/16/2023     04/19/2024     05/16/2023     BMP:   Lab Results   Component Value Date     04/11/2024     05/16/2023    POTASSIUM 4.2 04/11/2024    POTASSIUM 4.2 05/16/2023    CHLORIDE 107 04/11/2024    CHLORIDE 101 05/16/2023    CO2 24 04/11/2024    CO2 29 05/16/2023    BUN 15.5 04/11/2024    BUN 12.5 05/16/2023    CR 0.69 04/11/2024    CR 0.86 05/16/2023     (H) 04/11/2024     (H) 05/16/2023     COAGS: No results found for: \"PTT\", \"INR\", \"FIBR\"  POC: No results found for: \"BGM\", \"HCG\", \"HCGS\"  HEPATIC:   Lab Results   Component Value Date    ALBUMIN 4.3 04/11/2024    PROTTOTAL 6.8 04/11/2024    ALT 18 04/11/2024    AST 21 04/11/2024    ALKPHOS 54 04/11/2024    BILITOTAL 0.5 04/11/2024     OTHER:   Lab Results   Component Value Date    A1C 6.0 (H) 04/11/2024    HORACIO 9.5 04/11/2024    MAG 2.1 05/24/2023    TSH 1.80 04/11/2024       Anesthesia Plan    ASA Status:  3    NPO Status:  NPO Appropriate    Anesthesia Type: MAC.     - Reason for MAC: straight local not clinically adequate              Consents    Anesthesia Plan(s) and associated risks, benefits, and realistic alternatives discussed. Questions answered and patient/representative(s) expressed understanding.     - Discussed: Risks, Benefits and Alternatives for BOTH SEDATION and the PROCEDURE were discussed     - Discussed with:  Patient      - Extended Intubation/Ventilatory Support Discussed: No.      - Patient is DNR/DNI Status: No     Use of blood products discussed: No .     Postoperative Care        " "    Comments:    Other Comments: 4/19/24 [place] [facility]   Had 1mg versed with left eye-did well-wants same this time               JOY BRITO CRNA    I have reviewed the pertinent notes and labs in the chart from the past 30 days and (re)examined the patient.  Any updates or changes from those notes are reflected in this note.              # Overweight: Estimated body mass index is 29.23 kg/m  as calculated from the following:    Height as of 4/22/24: 1.6 m (5' 3\").    Weight as of 4/22/24: 74.8 kg (165 lb).      "

## 2024-05-08 ENCOUNTER — ALLIED HEALTH/NURSE VISIT (OUTPATIENT)
Dept: AUDIOLOGY | Facility: OTHER | Age: 84
End: 2024-05-08
Attending: FAMILY MEDICINE
Payer: MEDICARE

## 2024-05-08 DIAGNOSIS — H91.93 DECREASED HEARING OF BOTH EARS: Primary | ICD-10-CM

## 2024-05-08 PROCEDURE — V5299 HEARING SERVICE: HCPCS

## 2024-05-08 NOTE — PROGRESS NOTES
Background:  Received repaired Right Oticon Real 3 miniRITE R hearing aid.       Procedures Performed:  The  completed the following repairs: replaced amplifier. Reprogrammed to user settings.    Follow up:   Ms. Ellis was called for hearing aid  at .  Return to clinic as needed.      Chiqui Harrell  Audiology Assistant  [facility]  [phone]

## 2024-05-13 ENCOUNTER — HOSPITAL ENCOUNTER (OUTPATIENT)
Dept: ULTRASOUND IMAGING | Facility: HOSPITAL | Age: 84
Discharge: HOME | End: 2024-05-13
Attending: FAMILY MEDICINE | Admitting: FAMILY MEDICINE
Payer: MEDICARE

## 2024-05-13 DIAGNOSIS — E04.1 NODULE OF RIGHT LOBE OF THYROID GLAND: ICD-10-CM

## 2024-05-13 PROCEDURE — 76536 US EXAM OF HEAD AND NECK: CPT

## 2024-05-13 NOTE — DISCHARGE INSTRUCTIONS
After Anesthesia (Sleep Medicine)  What should I do after anesthesia?  You should rest and relax for the next 24 hours. Avoid risky or difficult (strenuous) activity. A responsible adult should stay with you overnight.  Don't drive or use any heavy equipment for 24 hours. Even if you feel normal, your reactions may be affected by the sleep medicine given to you.  Don't drink alcohol or make any important decisions for 24 hours.  Slowly get back to your regular diet, as you feel able.  How should I expect to feel?  It's normal to feel dizzy, light-headed, or faint for up to a full day after anesthesia or while taking pain medicine. If this happens:   Sit down for a few minutes before standing.  Have someone help you when you get up to walk or use the bathroom.  If you have nausea (feel sick to your stomach) or vomit (throw up):   Drink clear liquids (such as apple juice, ginger ale, broth, or 7UP) until you feel better.  If you feel sick to your stomach, or you keep vomiting for 24 hours, please call the doctor.  What else should I know?  You might have a dry mouth, sore throat, muscle aches, or trouble sleeping. These should go away after 24 hours.  Please contact your doctor if you have any other symptoms that concern you, such as fever, pain, bleeding, fluid drainage, swelling, or headache, or if it's been over 8 to 10 hours and you still aren't able to pee (urinate).  If you have a history of sleep apnea, it's very important to use your CPAP machine for the next 24 hours when you nap or sleep.   For informational purposes only. Not to replace the advice of your health care provider. Copyright   2023 [employer]. All rights reserved. Clinically reviewed by Waylon King MD. [employer] [id number] - REV 09/23.

## 2024-05-14 ENCOUNTER — ANESTHESIA (OUTPATIENT)
Dept: SURGERY | Facility: HOSPITAL | Age: 84
End: 2024-05-14
Payer: MEDICARE

## 2024-05-14 ENCOUNTER — HOSPITAL ENCOUNTER (OUTPATIENT)
Facility: HOSPITAL | Age: 84
Discharge: HOME | End: 2024-05-14
Attending: OPHTHALMOLOGY | Admitting: OPHTHALMOLOGY
Payer: MEDICARE

## 2024-05-14 VITALS
BODY MASS INDEX: 28.93 KG/M2 | SYSTOLIC BLOOD PRESSURE: 132 MMHG | HEIGHT: 63 IN | DIASTOLIC BLOOD PRESSURE: 76 MMHG | WEIGHT: 163.3 LBS | RESPIRATION RATE: 16 BRPM | OXYGEN SATURATION: 97 % | TEMPERATURE: 98.2 F

## 2024-05-14 PROCEDURE — V2632 POST CHMBR INTRAOCULAR LENS: HCPCS | Performed by: OPHTHALMOLOGY

## 2024-05-14 PROCEDURE — 272N000001 HC OR GENERAL SUPPLY STERILE: Performed by: OPHTHALMOLOGY

## 2024-05-14 PROCEDURE — 66984 XCAPSL CTRC RMVL W/O ECP: CPT | Performed by: NURSE ANESTHETIST, CERTIFIED REGISTERED

## 2024-05-14 PROCEDURE — 370N000017 HC ANESTHESIA TECHNICAL FEE, PER MIN: Performed by: OPHTHALMOLOGY

## 2024-05-14 PROCEDURE — 710N000012 HC RECOVERY PHASE 2, PER MINUTE: Performed by: OPHTHALMOLOGY

## 2024-05-14 PROCEDURE — 99100 ANES PT EXTEME AGE<1 YR&>70: CPT | Performed by: NURSE ANESTHETIST, CERTIFIED REGISTERED

## 2024-05-14 PROCEDURE — 250N000011 HC RX IP 250 OP 636: Performed by: OPHTHALMOLOGY

## 2024-05-14 PROCEDURE — 360N000076 HC SURGERY LEVEL 3, PER MIN: Performed by: OPHTHALMOLOGY

## 2024-05-14 PROCEDURE — 250N000009 HC RX 250: Performed by: OPHTHALMOLOGY

## 2024-05-14 PROCEDURE — 999N000141 HC STATISTIC PRE-PROCEDURE NURSING ASSESSMENT: Performed by: OPHTHALMOLOGY

## 2024-05-14 PROCEDURE — 250N000009 HC RX 250: Performed by: NURSE ANESTHETIST, CERTIFIED REGISTERED

## 2024-05-14 DEVICE — LENS-SOFPORT 20.5: Type: IMPLANTABLE DEVICE | Site: EYE | Status: FUNCTIONAL

## 2024-05-14 RX ORDER — PHENYLEPHRINE HYDROCHLORIDE 100 MG/ML
1 SOLUTION/ DROPS OPHTHALMIC ONCE
Status: COMPLETED | OUTPATIENT
Start: 2024-05-14 | End: 2024-05-14

## 2024-05-14 RX ORDER — PHENYLEPHRINE HYDROCHLORIDE 100 MG/ML
1 SOLUTION/ DROPS OPHTHALMIC
Status: DISCONTINUED | OUTPATIENT
Start: 2024-05-14 | End: 2024-05-14 | Stop reason: HOSPADM

## 2024-05-14 RX ORDER — LIDOCAINE HYDROCHLORIDE 10 MG/ML
INJECTION, SOLUTION EPIDURAL; INFILTRATION; INTRACAUDAL; PERINEURAL PRN
Status: DISCONTINUED | OUTPATIENT
Start: 2024-05-14 | End: 2024-05-14 | Stop reason: HOSPADM

## 2024-05-14 RX ORDER — LIDOCAINE HYDROCHLORIDE 20 MG/ML
JELLY TOPICAL ONCE
Status: COMPLETED | OUTPATIENT
Start: 2024-05-14 | End: 2024-05-14

## 2024-05-14 RX ORDER — DEXAMETHASONE SODIUM PHOSPHATE 10 MG/ML
4 INJECTION, SOLUTION INTRAMUSCULAR; INTRAVENOUS
Status: DISCONTINUED | OUTPATIENT
Start: 2024-05-14 | End: 2024-05-14 | Stop reason: HOSPADM

## 2024-05-14 RX ORDER — PROPARACAINE HYDROCHLORIDE 5 MG/ML
1 SOLUTION/ DROPS OPHTHALMIC ONCE
Status: COMPLETED | OUTPATIENT
Start: 2024-05-14 | End: 2024-05-14

## 2024-05-14 RX ORDER — CYCLOPENTOLAT/TROPIC/PHENYLEPH 1%-1%-2.5%
1 DROPS (EA) OPHTHALMIC (EYE)
Status: COMPLETED | OUTPATIENT
Start: 2024-05-14 | End: 2024-05-14

## 2024-05-14 RX ORDER — ACETAMINOPHEN 325 MG/1
650 TABLET ORAL
Status: DISCONTINUED | OUTPATIENT
Start: 2024-05-14 | End: 2024-05-14 | Stop reason: HOSPADM

## 2024-05-14 RX ORDER — TETRACAINE HYDROCHLORIDE 5 MG/ML
SOLUTION OPHTHALMIC PRN
Status: DISCONTINUED | OUTPATIENT
Start: 2024-05-14 | End: 2024-05-14 | Stop reason: HOSPADM

## 2024-05-14 RX ORDER — MOXIFLOXACIN 5 MG/ML
SOLUTION/ DROPS OPHTHALMIC PRN
Status: DISCONTINUED | OUTPATIENT
Start: 2024-05-14 | End: 2024-05-14 | Stop reason: HOSPADM

## 2024-05-14 RX ORDER — NALOXONE HYDROCHLORIDE 0.4 MG/ML
0.1 INJECTION, SOLUTION INTRAMUSCULAR; INTRAVENOUS; SUBCUTANEOUS
Status: DISCONTINUED | OUTPATIENT
Start: 2024-05-14 | End: 2024-05-14 | Stop reason: HOSPADM

## 2024-05-14 RX ORDER — DEXMEDETOMIDINE HYDROCHLORIDE 4 UG/ML
INJECTION, SOLUTION INTRAVENOUS PRN
Status: DISCONTINUED | OUTPATIENT
Start: 2024-05-14 | End: 2024-05-14

## 2024-05-14 RX ORDER — PROPARACAINE HYDROCHLORIDE 5 MG/ML
1 SOLUTION/ DROPS OPHTHALMIC
Status: DISCONTINUED | OUTPATIENT
Start: 2024-05-14 | End: 2024-05-14 | Stop reason: HOSPADM

## 2024-05-14 RX ORDER — LEVOBUNOLOL HYDROCHLORIDE 5 MG/ML
SOLUTION/ DROPS OPHTHALMIC PRN
Status: DISCONTINUED | OUTPATIENT
Start: 2024-05-14 | End: 2024-05-14 | Stop reason: HOSPADM

## 2024-05-14 RX ORDER — CYCLOPENTOLATE HYDROCHLORIDE 20 MG/ML
1 SOLUTION/ DROPS OPHTHALMIC ONCE
Status: COMPLETED | OUTPATIENT
Start: 2024-05-14 | End: 2024-05-14

## 2024-05-14 RX ORDER — MOXIFLOXACIN 5 MG/ML
1 SOLUTION/ DROPS OPHTHALMIC
Status: COMPLETED | OUTPATIENT
Start: 2024-05-14 | End: 2024-05-14

## 2024-05-14 RX ORDER — PILOCARPINE HYDROCHLORIDE 10 MG/ML
SOLUTION/ DROPS OPHTHALMIC PRN
Status: DISCONTINUED | OUTPATIENT
Start: 2024-05-14 | End: 2024-05-14 | Stop reason: HOSPADM

## 2024-05-14 RX ORDER — ONDANSETRON 2 MG/ML
4 INJECTION INTRAMUSCULAR; INTRAVENOUS EVERY 30 MIN PRN
Status: DISCONTINUED | OUTPATIENT
Start: 2024-05-14 | End: 2024-05-14 | Stop reason: HOSPADM

## 2024-05-14 RX ORDER — ONDANSETRON 4 MG/1
4 TABLET, ORALLY DISINTEGRATING ORAL EVERY 30 MIN PRN
Status: DISCONTINUED | OUTPATIENT
Start: 2024-05-14 | End: 2024-05-14 | Stop reason: HOSPADM

## 2024-05-14 RX ORDER — LIDOCAINE 40 MG/G
CREAM TOPICAL
Status: DISCONTINUED | OUTPATIENT
Start: 2024-05-14 | End: 2024-05-14 | Stop reason: HOSPADM

## 2024-05-14 RX ADMIN — Medication 1 DROP: at 09:46

## 2024-05-14 RX ADMIN — MOXIFLOXACIN OPHTHALMIC SOLUTION 1 DROP: 5 SOLUTION/ DROPS OPHTHALMIC at 09:52

## 2024-05-14 RX ADMIN — CYCLOPENTOLATE HYDROCHLORIDE 1 DROP: 20 SOLUTION/ DROPS OPHTHALMIC at 09:41

## 2024-05-14 RX ADMIN — LIDOCAINE HYDROCHLORIDE: 20 JELLY TOPICAL at 10:08

## 2024-05-14 RX ADMIN — PHENYLEPHRINE HYDROCHLORIDE 1 DROP: 100 SOLUTION/ DROPS OPHTHALMIC at 10:01

## 2024-05-14 RX ADMIN — PROPARACAINE HYDROCHLORIDE 1 DROP: 5 SOLUTION/ DROPS OPHTHALMIC at 09:41

## 2024-05-14 RX ADMIN — DEXMEDETOMIDINE HYDROCHLORIDE 8 MCG: 4 INJECTION, SOLUTION INTRAVENOUS at 10:40

## 2024-05-14 RX ADMIN — Medication 1 DROP: at 10:08

## 2024-05-14 RX ADMIN — MOXIFLOXACIN OPHTHALMIC SOLUTION 1 DROP: 5 SOLUTION/ DROPS OPHTHALMIC at 09:46

## 2024-05-14 RX ADMIN — MOXIFLOXACIN OPHTHALMIC SOLUTION 1 DROP: 5 SOLUTION/ DROPS OPHTHALMIC at 09:57

## 2024-05-14 ASSESSMENT — ACTIVITIES OF DAILY LIVING (ADL)
ADLS_ACUITY_SCORE: 33
ADLS_ACUITY_SCORE: 35
ADLS_ACUITY_SCORE: 35

## 2024-05-14 NOTE — INTERVAL H&P NOTE
I have reviewed the surgical (or preoperative) H&P that is linked to this encounter, and examined the patient. There are no significant changes. Tolerated Tylenol and ketorolac / Omidria with 1st eye surgery.  Benji Lundy MD      Clinical Conditions Present on Arrival:  Clinically Significant Risk Factors Present on Admission

## 2024-05-14 NOTE — OP NOTE
[facility]  Ophthalmology Full Operative Note    Pre-operative diagnosis: Combined form of age-related cataract, left eye [H25.812]   Post-operative diagnosis Same   Procedure: Procedure(s):  Phacoemulsification Cataract Extraction posterior Chamber Lens Left Eye   Surgeon: Benji Lundy MD   Assistants(s):    Anesthesia: MAC with Local    Estimated blood loss: None    Total IV fluids: (See anesthesia record)   Specimens: None   Implants: 20.5 LI61AO   Findings:    Complications: None   Condition: Stable   Comments:       PROCEDURAL ANESTHESIA:     Topical/MAC.  Lidocaine 2% jelly topically and Lidocaine 1% preservative-free intracamerally.     PROCEDURE:  The patient was brought to the Operating Room and prepped and draped in a sterile manner.  A wire lid speculum was placed.  A paracentesis was created and 1% Lidocaine was instilled in the anterior chamber.  The anterior chamber was then filled with Amvisc viscoelastic.  A clear cornea temporal wound was created using a 2.8 mm keratome.  A cystotome was used to initiate a flap in the anterior capsule and a Utrata forceps was used to create a continuous tear capsulorhexis.  Hydrodissection was performed.  The phacoemulsification tip was inserted into the eye and the nucleus and epinucleus were removed using a divide and conquer technique.  The residual cortex was removed using the I/A handpiece.  The anterior chamber was then refilled with viscoelastic and the wound was enlarged with the keratome.  The intraocular lens, 20.5 diopter, Model LI61AO, was placed into the injector and injected into the capsular bag. It was checked to make sure that it was central and stable.  Residual viscoelastic was removed using the I/A.  The anterior chamber was refilled with BSS.  The wounds were hydrated with BSS and were noted to be watertight with no suture necessary.  Topical pilocarpine 1%, Betagan, and Vigamox was applied.  A hard shield was placed.     The  patient tolerated the procedure well and was sent to the Recovery Room in satisfactory condition.

## 2024-05-14 NOTE — ANESTHESIA POSTPROCEDURE EVALUATION
Medication Therapy Management (MTM) Encounter    ASSESSMENT:                            Medication Adherence/Access: Recommend patient bring in all medications, injections and pillbox to next visit to fully assess adherence.     1. Type 2 diabetes mellitus treated without insulin (H)  Recent A1c remains elevated, not meeting goal of <7%.  Patient currently taking max dose metformin, and basal/bolus insulin.  Given elevated postprandial sugars in the evening, recommend patient increase Novolog to 10 units with the first meal of the day and 12 units with the second meal of the day. Education provided regarding using Novolog before meals and to skip the dose if she is skipping a meal. Patient understands the Lantus dose will remain the same and to only change the evening dose of Novolog.Patient open to starting CGM again today. Prescriptions sent to pharmacy. Patient and her family will set up CGM at home as they have done it in the past and understand to call the clinic to set up an appointment if they have any issues.     2. Gastroesophageal reflux disease without esophagitis  Patient notes that her symptoms are chronic but improve with current medication including as needed TUMS.  Patient currently prescribed PPI, H2 blocker, and sucralfate. Recommend patient continue with current regimen.     3. Severe persistent asthma, unspecified whether complicated  Managed by pulmonologist/allergist. Since last MTM visit, patient was able to get her Dupixent injections and has been taking them as prescribed. Recommend patient continue to follow closely with pulmonologist.     4. S/P coronary artery stent placement  Managed by cardiologist, recently seen.  No changes recommended to medications today.    5. Pain  Managed on current medications, no changes recommended today.    6. Anxiety   Much improved since starting selective serotonin reuptake inhibitor. Recommend continuing with current medication. In the future, could  Patient: Iris Ellis    Procedure: Procedure(s):  Phacoemulsification Cataract Extraction posterior Chamber Lens Left Eye       Anesthesia Type:  MAC    Note:  Disposition: Outpatient   Postop Pain Control: Uneventful            Sign Out: Well controlled pain   PONV: No   Neuro/Psych: Uneventful            Sign Out: Acceptable/Baseline neuro status   Airway/Respiratory: Uneventful            Sign Out: Acceptable/Baseline resp. status   CV/Hemodynamics: Uneventful            Sign Out: Acceptable CV status; No obvious hypovolemia; No obvious fluid overload   Other NRE: NONE   DID A NON-ROUTINE EVENT OCCUR? No       Last vitals:  Vitals Value Taken Time   /76 05/14/24 1115   Temp     Pulse 61 05/14/24 1110   Resp 16 05/14/24 1115   SpO2 97 % 05/14/24 1115   Vitals shown include unfiled device data.    Electronically Signed By: JOY Armendariz CRNA  May 14, 2024  11:15 AM   increase fluoxetine as needed. At next visit, confirm patient is taking fluoxetine in the morning as this ssri tends to be activating.    PLAN:                            1.  Increasing insulin dose today, patient to take as prescribed: NovoLog 10 units before your first meal of the day and 12 units before the second meal of the day (do not use Novolog if you skip a meal or have blood sugar of 70 or less)  2.  Restart use of CGM today:  the yellow boxes (freestyle Gemma 2 sensor and meters) and scan at least 3-4 times per day    In future: consider increasing fluoxetine as needed    Follow-up: Return in about 2 weeks (around 11/30/2022) for with me. To review CGM results (restarting use of CGM Today)  SUBJECTIVE/OBJECTIVE:                          Kulwant Amin is a 52 year old female coming in for a follow-up visit. Patient seen with [facility] . Today's visit is a follow-up MTM visit from 9/20/22 ID#[id number]. She is here today with her daughter and son.      Reason for visit: MTM follow up.    Allergies/ADRs: Reviewed in chart  Past Medical History: Reviewed in chart  Tobacco: She reports that she has never smoked. She has never used smokeless tobacco.    Medication Adherence/Access: Patient uses pill box(es). Nurse sets up medications in a pillbox, visits her home.  Patient presents with medication vials today (except insulin pens, loratadine and fluoxetine) and doesn't bring the pillbox today.  Patient takes medications 3 time(s) per day (although sucralfate is prescribed four times daily).   Per patient, misses medication 0 times per week and does not ever miss her insulin.     Type 2 Diabetes:  Metformin 1000 mg twice daily, Lantus 40 units once daily,  Novolog 10 units twice daily (unclear exactly when patient is taking insulin, so education provided to confirm she only uses insulin about 15 mins before meals and not also taking after meals)  - Daughter in law helps patient with insulin injections to  patient but is not with the patient today so unable to confirm dose or administration, reports no missed doses lately.   - Patient is not experiencing side effects.   - Was recently on prednisone taper per pulm starting 10/28 x 14days (sugars for that time was subsequently elevated from her baseline)  Blood sugar monitorin time(s) daily. Ranges (patient blood sugar log):   Hypoglycemia? None per glucose log. Aware of how to correct for lows.   Hyperglycemia? Dry mouth  Date FBG 1-2 hours postprandial    110 202    95 225    120 212   11/10 96 189    86 214    105 177    126 215   Diet/Exercise: Reports limiting portions, eating boiled rice. Reports eating 2meals per day  Aspirin: Taking 81mg daily for secondary prevention, (previously reported bloody stools but none recently).   Statin: Yes: atorvastatin 80   ACEi/ARB: No.  Not indicated  Lab Results   Component Value Date    A1C 10.2 2022    A1C 10.9 2022    A1C 8.0 2021     Creatinine   Date Value Ref Range Status   10/13/2022 0.51 0.51 - 0.95 mg/dL Final   2015 0.7 0.5 - 1.0 mg/dL Final      Latest Reference Range & Units 21 11:20   Microalbumin Urine mg/dL 0.00 - 1.99 mg/dL <0.50     GERD: omeprazole 40 mg daily, famotidine 20 mg twice daily, sucralfate 1 GM four times daily (has been taking about 3 times daily) , and Tums as needed (not with patient today but she says she buys this OTC).   - Reports heartburn symptoms but states that tums help to resolve symptoms  - Per chart review patient recently seen in ED for chest pain, determined to be GI source.    Allergies/asthma/hypereosinophilia: loratadine 10mg daily (does not have prescription vial with her today but LF 22 per fill hx), montelukast 10 mg daily, Breo Ellipta 200/25mcg 1 puff daily, Spiriva 2.5 mcg 2 puffs daily, albuterol neb three times daily every day and albuterol HFA daily as needed, Dupixent 300 mg every 2 weeks injections  (which was restarted and pulm would like her to continue taking)  - She states that she is still having cough and shortness of breath, but feeling better since seeing pulm and completing prednisone taper  - Recently prescribed Prednisone, nystatin oral & fluconazole for exacerbation/thrush as prescribed by her pulmonologist (last seen 11/14 with plan to follow up in 6 weeks)  - She uses her albuterol inhaler at least once daily in addition to PRN especially now that it is cold outside. She also has an albuterol neb at home which she uses 2-4 times per day  - Pulmonologist: [place] [facility]- Tamra Mohr.     - Allergy & immunology: [facility]-Dr. Elizabeth Marie.     SVT/CAD: Aspirin 81 mg daily, atorvastatin 80 mg daily, metoprolol tartrate 25 mg twice daily (patient has two bottles with her today with different fill dates; 7/2/22 #180/90 that is full and one from 9/27/22 #180/90)  - Denies any symptoms of chest pain or dizziness. History of stroke and since then always has a headache.   - No signs of bleeding right now, but has bleeding hemorrhoids usually once monthly. PCP is aware of patients hemorrhoids.   - Has meclizine 12.5 mg to take as needed for dizziness but this is not with her today  - Cardiologist: Del Hirsch MD. last visit was 9/19/2022  Pulse Readings from Last 3 Encounters:   11/16/22 99   11/14/22 85   10/28/22 68     Pain: Cyclobenzaprine 5 mg in the evening (prescribed 1-2 tablets three times daily prn), amitrityline 10 mg - 2 tablets at bedtime, voltaren 1% gel, gabapentin 300mg 2 capsules in the evening (prescribed 2 capsules three times daily)   - States she has chronic muscle spasms and pain  - Unable to confirm how gabapentin is being set up in the pillbox but patient believes she is only take 2 capsules once dialy in th evening      Anxiety/Depression: [id number] once daily  - Prescribed in October by PCP but does NOT have the bottle with her today  - was  taking alprazolam as prescribed in ED, patient found this helpful, PCP preferred to start her on maintenance meds for mood rather than as needed benzo  - Since starting fluoxetine, she has noticed an improvement in her mood   - Feeling much better than she was prior to being on fluoxetine     PHQ 8/25/2022 10/3/2022 10/25/2022   PHQ-9 Total Score 15 13 14   Q9: Thoughts of better off dead/self-harm past 2 weeks Several days Not at all Not at all   F/U: Thoughts of suicide or self-harm No - -   F/U: Safety concerns No - -       Today's Vitals: /70   Pulse 99   Wt 123 lb 12 oz (56.1 kg)   SpO2 98%   BMI 25.86 kg/m    ----------------    I spent 60 minutes with this patient today. All changes were made via collaborative practice agreement with Adrien Cardoza MD. A copy of the visit note was provided to the patient's provider(s).    The patient was sent via [employer] a summary of these recommendations.     Luna Yoo, Pharm.D., MPH  MTM Pharmacist Resident   [facility]    Malu Gil, PharmD, BCACP  Medication Therapy Management Pharmacist     Medication Therapy Recommendations  Type 2 diabetes mellitus treated with insulin (H)    Current Medication: insulin aspart (NOVOLOG PEN) 100 UNIT/ML pen (Discontinued)   Rationale: Dose too low - Dosage too low - Effectiveness   Recommendation: Increase Dose   Status: Accepted per CPA

## 2024-05-14 NOTE — OR NURSING
Patient and responsible adult given discharge instructions with no questions regarding instructions. Sina score 20. Pain level 0/10.  Discharged from unit via wheelchair. Patient discharged to home.

## 2024-05-18 ENCOUNTER — MYC MEDICAL ADVICE (OUTPATIENT)
Dept: FAMILY MEDICINE | Facility: OTHER | Age: 84
End: 2024-05-18

## 2024-05-20 NOTE — TELEPHONE ENCOUNTER
5/20/2024 2:30 PM    Writer called patient regarding my chart message. Two attempts made. Pt answered on second attempt. Per pt's report her endocrinologist wanted a yearly follow-up. Pt will call her Endocrinologist in [place] to inquire on her appointment questions for [facility]. Pt has no further questions or concerns.     Chandrika RUIZ RN, Care Coordinator

## 2024-06-05 ENCOUNTER — HOSPITAL ENCOUNTER (EMERGENCY)
Facility: HOSPITAL | Age: 84
Discharge: HOME | End: 2024-06-05
Attending: PHYSICIAN ASSISTANT | Admitting: PHYSICIAN ASSISTANT
Payer: MEDICARE

## 2024-06-05 VITALS
WEIGHT: 163.36 LBS | SYSTOLIC BLOOD PRESSURE: 135 MMHG | HEIGHT: 63 IN | DIASTOLIC BLOOD PRESSURE: 78 MMHG | BODY MASS INDEX: 28.95 KG/M2 | HEART RATE: 65 BPM | RESPIRATION RATE: 16 BRPM | OXYGEN SATURATION: 96 % | TEMPERATURE: 98 F

## 2024-06-05 DIAGNOSIS — U07.1 COVID-19 VIRUS INFECTION: ICD-10-CM

## 2024-06-05 PROCEDURE — G0463 HOSPITAL OUTPT CLINIC VISIT: HCPCS

## 2024-06-05 PROCEDURE — 99213 OFFICE O/P EST LOW 20 MIN: CPT | Performed by: PHYSICIAN ASSISTANT

## 2024-06-05 ASSESSMENT — ENCOUNTER SYMPTOMS
SORE THROAT: 1
CONSTITUTIONAL NEGATIVE: 1
RESPIRATORY NEGATIVE: 1
NEUROLOGICAL NEGATIVE: 1
CARDIOVASCULAR NEGATIVE: 1
MYALGIAS: 0

## 2024-06-05 ASSESSMENT — ACTIVITIES OF DAILY LIVING (ADL): ADLS_ACUITY_SCORE: 35

## 2024-06-05 NOTE — DISCHARGE INSTRUCTIONS
- Continue with hydration (water is the best expectorant) and rest.  - Coat the throat by eating oatmeal or taking honey in warm tea (if older than 1 year).  - Fisherman Friend or a lozenge with 10mg or more menthol can help with calming cough receptors.  - Saltwater gargles to support mucosa/throat lining. (May add a sprinkle of cayenne pepper if tolerated for warmth/numbing effect of capsaicin)

## 2024-06-05 NOTE — ED PROVIDER NOTES
History     Chief Complaint   Patient presents with    Covid Concern     Covid positive     HPI  Iris Ellis is a 84 year old female who presents for check in after ST/congestion that started last week and CV19 positive test today. Iris reports she feels overall pretty well, just some throat clearing and drainage. No severe cough, overt shortness of breath, and no fevers or fatigue.     Allergies:  Allergies   Allergen Reactions    Nabumetone Rash    Acetaminophen Nausea     Lortab    Naproxen Nausea     Naprosyn    Rofecoxib      Upsets stomach  Vioxx    Hydrocodone Bitartrate Nausea     Lortab       Problem List:    Patient Active Problem List    Diagnosis Date Noted    Cataract of both eyes, unspecified cataract type 04/19/2024     Priority: Medium    MILES (generalized anxiety disorder) 04/19/2024     Priority: Medium    Ventricular tachycardia, unspecified (H) 04/11/2024     Priority: Medium    Paroxysmal supraventricular tachycardia (H24) 08/09/2023     Priority: Medium    Ventricular trigeminy 08/09/2023     Priority: Medium    Transient alteration of awareness 08/09/2023     Priority: Medium    Ventricular ectopy 08/09/2023     Priority: Medium    Hyperlipidemia LDL goal <100 08/09/2023     Priority: Medium    Nodule of right lobe of thyroid gland 06/14/2023     Priority: Medium    High magnesium levels 05/24/2023     Priority: Medium    Syncope and collapse 05/24/2023     Priority: Medium    Episodic confusion 05/24/2023     Priority: Medium    Vision changes 05/24/2023     Priority: Medium    Gastroesophageal reflux disease, unspecified whether esophagitis present 03/29/2023     Priority: Medium    Iron deficiency 03/29/2023     Priority: Medium    Acute bilateral thoracic back pain 02/24/2023     Priority: Medium    Acute bilateral low back pain without sciatica 02/24/2023     Priority: Medium    Post-menopausal 01/23/2023     Priority: Medium    Right foot pain 01/23/2023     Priority: Medium     Caregiver stress 11/18/2022     Priority: Medium    Multiple joint pain 11/18/2022     Priority: Medium    Benign paroxysmal positional vertigo due to bilateral vestibular disorder 11/18/2022     Priority: Medium    Obesity without serious comorbidity, unspecified classification, unspecified obesity type 11/18/2022     Priority: Medium    Lymphocytic colitis 10/30/2020     Priority: Medium     [facility]      Age-related osteoporosis without current pathological fracture 11/08/2019     Priority: Medium    Benign essential hypertension 07/31/2016     Priority: Medium    Knee pain 03/28/2013     Priority: Medium    Elevated LFTs 03/28/2013     Priority: Medium    Sleep related leg cramps 03/28/2013     Priority: Medium    Preventative health care 03/28/2013     Priority: Medium    Elevated glucose 03/28/2013     Priority: Medium    Mammogram declined 03/28/2013     Priority: Medium    Advanced care planning/counseling discussion 05/21/2012     Priority: Medium        Past Medical History:    Past Medical History:   Diagnosis Date    Blood glucose elevated     BPPV (benign paroxysmal positional vertigo)     Cystic disease of ovaries     Essential hypertension 07/2006    Family history of malignant neoplasm of gastrointestinal tract 04/25/2003    Lymphocytic colitis 10/30/2020    Osteoarthritis 1/20011    Osteoporosis     Other abnormal blood chemistry 09/20/2012    Paroxysmal supraventricular tachycardia (H24) 8/9/2023    psoriasis 04/21/2011    Pure hypercholesterolemia 11/22/1999    Symptomatic menopausal or female climacteric states 03/31/2003    Thyroid nodule 5/24/2023    Unspecified closed fracture of pelvis 09/20/2011    Urethral stricture        Past Surgical History:    Past Surgical History:   Procedure Laterality Date    APPENDECTOMY  01/01/1944    AS REVISE TOTAL HIP REPLACEMENT Left 2018    Dr Whaley    CHOLECYSTECTOMY  01/01/1979    colonoscopies  1998, 2003, 2007, 2009, 9/18/2014    Colonic  polyps. Repeat colonoscopy 2013    Declines further mammograms  2009    Left superior and inferior pubic rami fractures  2006    ORTHOPEDIC SURGERY Right 2011    total right hip. Dr. Nelson    PACs, PVCs, SVT, bradycardia on Holter Moniter  2009    Dr. Pace    PHACOEMULSIFICATION WITH STANDARD INTRAOCULAR LENS IMPLANT Right 2024    Procedure: Phacoemulsification Cataract Extraction Posterior chamber Lens Right Eye;  Surgeon: Benji Lundy MD;  Location: [facility]    PHACOEMULSIFICATION WITH STANDARD INTRAOCULAR LENS IMPLANT Left 2024    Procedure: Phacoemulsification Cataract Extraction posterior Chamber Lens Left Eye;  Surgeon: Benji Lundy MD;  Location: [facility]    Urethral dilation  2003    Urethral stricture       Family History:    Family History   Problem Relation Age of Onset    Diabetes Mother 59         age 59 of pulmonar embolism  after amputation of leg due to diabetes    Alzheimer Disease Father 82    Coronary Artery Disease Sister         Heart attack    Colon Cancer Sister 84    Alzheimer Disease Sister         passed     Colon Cancer Brother 69    Lung Cancer Brother     Prostate Cancer Brother     Unknown/Adopted Maternal Grandmother     Unknown/Adopted Maternal Grandfather     Unknown/Adopted Paternal Grandmother     Unknown/Adopted Paternal Grandfather     Cancer Other         Colon-family h/o       Social History:  Marital Status:   [2]  Social History     Tobacco Use    Smoking status: Never    Smokeless tobacco: Never    Tobacco comments:     Was only a sort time  to    Vaping Use    Vaping status: Never Used   Substance Use Topics    Alcohol use: Yes     Comment: Wine occasionally when out to dinners    Drug use: No        Medications:    alendronate (FOSAMAX) 70 MG tablet  ASPIRIN 81 PO  Calcium Carbonate-Vit D-Min (CALCIUM 1200 PO)  citalopram (CELEXA) 10 MG tablet  famotidine (PEPCID) 40 MG tablet  Lactobacillus (ACIDOPHILUS  "PO)  metoprolol succinate ER (TOPROL XL) 50 MG 24 hr tablet  multivitamin (OCUVITE) TABS tablet  rosuvastatin (CRESTOR) 20 MG tablet          Review of Systems   Constitutional: Negative.    HENT:  Positive for congestion, postnasal drip and sore throat.    Respiratory: Negative.     Cardiovascular: Negative.    Musculoskeletal:  Negative for myalgias.   Neurological: Negative.        Physical Exam   BP: 135/78  Pulse: 65  Temp: 98  F (36.7  C)  Resp: 16  Height: 160 cm (5' 3\")  Weight: 74.1 kg (163 lb 5.8 oz)  SpO2: 96 %      Physical Exam  Vitals and nursing note reviewed.   Constitutional:       General: She is not in acute distress.     Appearance: She is not toxic-appearing.   HENT:      Mouth/Throat:      Mouth: Mucous membranes are moist.      Pharynx: Oropharynx is clear.   Eyes:      Conjunctiva/sclera: Conjunctivae normal.   Cardiovascular:      Rate and Rhythm: Normal rate and regular rhythm.   Pulmonary:      Effort: Pulmonary effort is normal.      Breath sounds: Normal breath sounds.   Skin:     General: Skin is warm and dry.   Neurological:      Mental Status: She is alert.         ED Course     No results found for this or any previous visit (from the past 24 hour(s)).  Medications - No data to display    Assessments & Plan (with Medical Decision Making)     I have reviewed the nursing notes.  I have reviewed the findings, diagnosis, plan and need for follow up with the patient.    Discharge Medication List as of 6/5/2024  1:11 PM          Final diagnoses:   COVID-19 virus infection   D/T duration of Sx, will continue with supportive care. Honey, menthol lozenges, rest and fluids. F/U PCP with poor progression, returning here/ER with concern for worsening.     6/5/2024   [facility]       Gaetano Sam PA  06/05/24 1350    "

## 2024-06-05 NOTE — ED TRIAGE NOTES
Pt presents with c/o testing positive for covid. Reports she tested positive about an hour and a half ago. States she doesn't really have sx. Came in because unsure what to do.

## 2024-10-01 PROBLEM — E66.9 OBESITY, UNSPECIFIED: Status: ACTIVE | Noted: 2022-11-18

## 2024-10-06 ENCOUNTER — MYC MEDICAL ADVICE (OUTPATIENT)
Dept: FAMILY MEDICINE | Facility: OTHER | Age: 84
End: 2024-10-06

## 2024-10-07 ENCOUNTER — TRANSFERRED RECORDS (OUTPATIENT)
Dept: HEALTH INFORMATION MANAGEMENT | Facility: CLINIC | Age: 84
End: 2024-10-07

## 2024-10-14 ENCOUNTER — OFFICE VISIT (OUTPATIENT)
Dept: FAMILY MEDICINE | Facility: OTHER | Age: 84
End: 2024-10-14
Attending: FAMILY MEDICINE
Payer: MEDICARE

## 2024-10-14 VITALS
TEMPERATURE: 97.3 F | BODY MASS INDEX: 28.35 KG/M2 | HEIGHT: 63 IN | SYSTOLIC BLOOD PRESSURE: 131 MMHG | RESPIRATION RATE: 16 BRPM | DIASTOLIC BLOOD PRESSURE: 74 MMHG | OXYGEN SATURATION: 96 % | WEIGHT: 160 LBS | HEART RATE: 64 BPM

## 2024-10-14 DIAGNOSIS — F33.0 MILD EPISODE OF RECURRENT MAJOR DEPRESSIVE DISORDER (H): ICD-10-CM

## 2024-10-14 DIAGNOSIS — R26.89 BALANCE PROBLEM: ICD-10-CM

## 2024-10-14 DIAGNOSIS — I10 BENIGN ESSENTIAL HYPERTENSION: Primary | ICD-10-CM

## 2024-10-14 DIAGNOSIS — Z23 NEED FOR VACCINATION: ICD-10-CM

## 2024-10-14 DIAGNOSIS — Z23 NEED FOR PROPHYLACTIC VACCINATION AND INOCULATION AGAINST INFLUENZA: ICD-10-CM

## 2024-10-14 DIAGNOSIS — M19.049 HAND ARTHRITIS: ICD-10-CM

## 2024-10-14 DIAGNOSIS — R73.09 ELEVATED GLUCOSE: ICD-10-CM

## 2024-10-14 PROCEDURE — G2211 COMPLEX E/M VISIT ADD ON: HCPCS | Performed by: FAMILY MEDICINE

## 2024-10-14 PROCEDURE — G0463 HOSPITAL OUTPT CLINIC VISIT: HCPCS | Mod: 25

## 2024-10-14 PROCEDURE — G0008 ADMIN INFLUENZA VIRUS VAC: HCPCS

## 2024-10-14 PROCEDURE — 90677 PCV20 VACCINE IM: CPT

## 2024-10-14 PROCEDURE — 99214 OFFICE O/P EST MOD 30 MIN: CPT | Performed by: FAMILY MEDICINE

## 2024-10-14 RX ORDER — METOPROLOL SUCCINATE 25 MG/1
25 TABLET, EXTENDED RELEASE ORAL DAILY
Qty: 90 TABLET | Refills: 2 | Status: SHIPPED | OUTPATIENT
Start: 2024-10-14

## 2024-10-14 RX ORDER — CITALOPRAM HYDROBROMIDE 20 MG/1
20 TABLET ORAL DAILY
Qty: 90 TABLET | Refills: 1 | Status: SHIPPED | OUTPATIENT
Start: 2024-10-14

## 2024-10-14 ASSESSMENT — ENCOUNTER SYMPTOMS: NERVOUS/ANXIOUS: 1

## 2024-10-14 ASSESSMENT — PAIN SCALES - GENERAL: PAINLEVEL: NO PAIN (0)

## 2024-10-14 NOTE — PROGRESS NOTES
"  Assessment & Plan     Benign essential hypertension  Change rx to 25mg tablet  - metoprolol succinate ER (TOPROL XL) 25 MG 24 hr tablet; Take 1 tablet (25 mg) by mouth daily.    Mild episode of recurrent major depressive disorder (H)  With caregiver fatigue  Increase to 20mg and follow-up 6-7 wks  - citalopram (CELEXA) 20 MG tablet; Take 1 tablet (20 mg) by mouth daily.    Balance problem  Agreeable to doing PT  - Physical Therapy  Referral; Future    Hand arthritis  Tolerable for now  Going to use tylenol regularly and voltaren gel also    Elevated glucose  restart  - Semaglutide (RYBELSUS) 3 MG tablet; Take 3 mg by mouth daily.    Need for prophylactic vaccination and inoculation against influenza  given    Need for vaccination  done  - PNEUMOCOCCAL 20 VALENT CONJUGATE (PREVNAR 20)          BMI  Estimated body mass index is 28.34 kg/m  as calculated from the following:    Height as of this encounter: 1.6 m (5' 3\").    Weight as of this encounter: 72.6 kg (160 lb).       Patient was agreeable to this plan and had no further questions.  There are no Patient Instructions on file for this visit.    No follow-ups on file.    Jenifer Simmons is a 84 year old, presenting for the following health issues:  Joint Pain and Anxiety        10/14/2024     9:47 AM   Additional Questions   Roomed by Leticia Guthrie   Accompanied by None         10/14/2024     9:47 AM   Patient Reported Additional Medications   Patient reports taking the following new medications None     Anxiety    History of Present Illness       Reason for visit:  Meds   She is taking medications regularly.     needs renewal on all medications   patient reports she had diarrhea all last week, it has resolved since but she just wanted to let you know     Concern - Joint stiffness   Onset: ongoing   Description: patient reports stiffness, and pain in the hands, stiffness in the feet   Progression of Symptoms:  worsening  Accompanying Signs & " Symptoms: patient reports that she can hardly lift anything with the hands anymore   Previous history of similar problem: ongoing   Precipitating factors:        Worsened by: none   Alleviating factors:        Improved by: none   Therapies tried and outcome: None      Anxiety   How are you doing with your anxiety since your last visit? No change  Are you having other symptoms that might be associated with anxiety? No  Have you had a significant life event? OTHER: caregiver stress     Are you feeling depressed? Yes:  unsure if she is feeling depressed, she reports feeling frustrated   Do you have any concerns with your use of alcohol or other drugs? No    Social History     Tobacco Use    Smoking status: Former     Types: Cigarettes    Smokeless tobacco: Never    Tobacco comments:     Was only a sort time 1971 to 1973   Vaping Use    Vaping status: Never Used   Substance Use Topics    Alcohol use: Yes     Comment: Wine occasionally when out to dinners    Drug use: No         10/31/2019    10:00 AM 1/23/2024     5:05 PM 4/6/2024    10:54 PM   MILES-7 SCORE   Total Score  2 (minimal anxiety) 2 (minimal anxiety)   Total Score 0 2    2    2 2         1/23/2023     2:52 PM 1/23/2024     5:04 PM 4/6/2024    10:54 PM   PHQ   PHQ-9 Total Score 7 6    6    6 6   Q9: Thoughts of better off dead/self-harm past 2 weeks Not at all Not at all Not at all         4/6/2024    10:54 PM   Last PHQ-9   1.  Little interest or pleasure in doing things 1   2.  Feeling down, depressed, or hopeless 1   3.  Trouble falling or staying asleep, or sleeping too much 0   4.  Feeling tired or having little energy 2   5.  Poor appetite or overeating 1   6.  Feeling bad about yourself 1   7.  Trouble concentrating 0   8.  Moving slowly or restless 0   Q9: Thoughts of better off dead/self-harm past 2 weeks 0   PHQ-9 Total Score 6         4/6/2024    10:54 PM   MILES-7    1. Feeling nervous, anxious, or on edge 0   2. Not being able to stop or control  "worrying 0   3. Worrying too much about different things 1   4. Trouble relaxing 0   5. Being so restless that it is hard to sit still 0   6. Becoming easily annoyed or irritable 1   7. Feeling afraid, as if something awful might happen 0   MILES-7 Total Score 2   If you checked any problems, how difficult have they made it for you to do your work, take care of things at home, or get along with other people? Not difficult at all           Review of Systems  Constitutional, neuro, ENT, endocrine, pulmonary, cardiac, gastrointestinal, genitourinary, musculoskeletal, integument and psychiatric systems are negative, except as otherwise noted.      Objective    BP (!) 147/76 (BP Location: Right arm, Patient Position: Sitting, Cuff Size: Adult Regular)   Pulse 64   Temp 97.3  F (36.3  C) (Tympanic)   Resp 16   Ht 1.6 m (5' 3\")   Wt 72.6 kg (160 lb)   SpO2 96%   BMI 28.34 kg/m    Body mass index is 28.34 kg/m .  Physical Exam   GENERAL: alert and no distress  RESP: lungs clear to auscultation - no rales, rhonchi or wheezes  CV: regular rate and rhythm, normal S1 S2, no S3 or S4, no murmur, click or rub, no peripheral edema  MS: no gross musculoskeletal defects noted, no edema  PSYCH: mentation appears normal, affect normal/bright    No results found for any visits on 10/14/24.        Signed Electronically by: Baylee Rodríguez MD      The longitudinal plan of care for the diagnosis(es)/condition(s) as documented were addressed during this visit. Due to the added complexity in care, I will continue to support Iris in the subsequent management and with ongoing continuity of care.    Answers submitted by the patient for this visit:  General Questionnaire (Submitted on 10/13/2024)  Chief Complaint: Chronic problems general questions HPI Form  What is the reason for your visit today? : Meds  How many days per week do you miss taking your medication?: 0    "

## 2024-12-11 DIAGNOSIS — H91.93 DECREASED HEARING OF BOTH EARS: Primary | ICD-10-CM

## 2024-12-18 ENCOUNTER — ALLIED HEALTH/NURSE VISIT (OUTPATIENT)
Dept: AUDIOLOGY | Facility: OTHER | Age: 84
End: 2024-12-18
Attending: AUDIOLOGIST
Payer: MEDICARE

## 2024-12-18 ENCOUNTER — OFFICE VISIT (OUTPATIENT)
Dept: FAMILY MEDICINE | Facility: OTHER | Age: 84
End: 2024-12-18
Attending: FAMILY MEDICINE
Payer: MEDICARE

## 2024-12-18 ENCOUNTER — OFFICE VISIT (OUTPATIENT)
Dept: AUDIOLOGY | Facility: OTHER | Age: 84
End: 2024-12-18
Attending: AUDIOLOGIST
Payer: MEDICARE

## 2024-12-18 VITALS
HEIGHT: 63 IN | WEIGHT: 165 LBS | OXYGEN SATURATION: 95 % | RESPIRATION RATE: 16 BRPM | BODY MASS INDEX: 29.23 KG/M2 | DIASTOLIC BLOOD PRESSURE: 68 MMHG | SYSTOLIC BLOOD PRESSURE: 134 MMHG | TEMPERATURE: 96.9 F | HEART RATE: 65 BPM

## 2024-12-18 DIAGNOSIS — R73.09 ELEVATED GLUCOSE: ICD-10-CM

## 2024-12-18 DIAGNOSIS — H91.93 DECREASED HEARING OF BOTH EARS: ICD-10-CM

## 2024-12-18 DIAGNOSIS — H91.93 DECREASED HEARING OF BOTH EARS: Primary | ICD-10-CM

## 2024-12-18 DIAGNOSIS — H90.3 SENSORINEURAL HEARING LOSS (SNHL) OF BOTH EARS: Primary | ICD-10-CM

## 2024-12-18 DIAGNOSIS — F33.0 MILD EPISODE OF RECURRENT MAJOR DEPRESSIVE DISORDER (H): ICD-10-CM

## 2024-12-18 DIAGNOSIS — Z12.11 SPECIAL SCREENING FOR MALIGNANT NEOPLASMS, COLON: ICD-10-CM

## 2024-12-18 DIAGNOSIS — I10 BENIGN ESSENTIAL HYPERTENSION: Primary | ICD-10-CM

## 2024-12-18 DIAGNOSIS — Z63.6 CAREGIVER STRESS: ICD-10-CM

## 2024-12-18 PROCEDURE — G0463 HOSPITAL OUTPT CLINIC VISIT: HCPCS

## 2024-12-18 PROCEDURE — 92552 PURE TONE AUDIOMETRY AIR: CPT | Performed by: AUDIOLOGIST

## 2024-12-18 PROCEDURE — 92556 SPEECH AUDIOMETRY COMPLETE: CPT | Performed by: AUDIOLOGIST

## 2024-12-18 SDOH — SOCIAL STABILITY - SOCIAL INSECURITY: DEPENDENT RELATIVE NEEDING CARE AT HOME: Z63.6

## 2024-12-18 ASSESSMENT — ANXIETY QUESTIONNAIRES: GAD7 TOTAL SCORE: INCOMPLETE

## 2024-12-18 ASSESSMENT — PATIENT HEALTH QUESTIONNAIRE - PHQ9
10. IF YOU CHECKED OFF ANY PROBLEMS, HOW DIFFICULT HAVE THESE PROBLEMS MADE IT FOR YOU TO DO YOUR WORK, TAKE CARE OF THINGS AT HOME, OR GET ALONG WITH OTHER PEOPLE: SOMEWHAT DIFFICULT
SUM OF ALL RESPONSES TO PHQ QUESTIONS 1-9: 5
SUM OF ALL RESPONSES TO PHQ QUESTIONS 1-9: 4
SUM OF ALL RESPONSES TO PHQ QUESTIONS 1-9: 5

## 2024-12-18 ASSESSMENT — PAIN SCALES - GENERAL: PAINLEVEL_OUTOF10: MILD PAIN (2)

## 2024-12-18 NOTE — PROGRESS NOTES
HEARING AID CHECK    BACKGROUND:  Iris Ellis, a 84 year old female, was seen today for an hearing aid check.  Ms. Ellis wears Binaural Oticon Real 3 miniRITE R hearing aids.  Ms. Ellis reported sounds of birds chirping in her hearing aids.    FINDINGS:  Visual inspection and cleaning provided.   C-stop showed cerumen impaction and changed with new. 8 mm Open domes changed with new. Battery was tested and good. Good listening check.    Reviewed cleaning, troubleshooting, and preventative care with patient. Any cleaning tools used were provided as customer courtesy.    Services performed and warranty reviewed with patient.    PLAN:  Patient was seen next by the audiologist. Ms. Ellis will return to clinic in 12 months, sooner if needed. Patient had no further questions and reports satisfaction.         Ashley Russo  Audiology Assistant  [facility]  [phone]

## 2024-12-18 NOTE — PROGRESS NOTES
Audiology Evaluation Completed. Please refer SCANNED AUDIOGRAM and/or TYMPANOGRAM for BACKGROUND, RESULTS, RECOMMENDATIONS.      Haley ANAYA, [facility]  Audiologist #[id number]

## 2024-12-18 NOTE — PROGRESS NOTES
Assessment & Plan     Benign essential hypertension  Stable, improved    Mild episode of recurrent major depressive disorder (H)  More situational second to her husbands condition declining, more tremor, more forgetful  She is committed to taking care of him at home as long as she can    Caregiver stress  See above, encouraged to reach out if other needs arise    Elevated glucose  Didn't tolerate rybelsus    Special screening for malignant neoplasms, colon  Would like to test as family history of colon polyps  - [facility]([employer]); Future    Patient was agreeable to this plan and had no further questions.  There are no Patient Instructions on file for this visit.    No follow-ups on file.    Jenifer Simmons is a 84 year old, presenting for the following health issues:  Hypertension, Depression, and Imm/Inj (COVID-19 VACCINE)        12/18/2024    11:00 AM   Additional Questions   Roomed by Ekaterina merida   Accompanied by self         12/18/2024   Declines Weight   Did patient decline having their weight taken? Yes          12/18/2024    11:00 AM   Patient Reported Additional Medications   Patient reports taking the following new medications None     History of Present Illness       Reason for visit:  Follow up   She is taking medications regularly.         Hypertension Follow-up    Do you check your blood pressure regularly outside of the clinic? No  Are you following a low salt diet? Yes does not add to cooking  Are your blood pressures ever more than 140 on the top number (systolic) OR more   than 90 on the bottom number (diastolic), for example 140/90? N/A    Depression   How are you doing with your depression since your last visit? No change  Are you having other symptoms that might be associated with depression? Yes:  Low energy and no interest  Have you had a significant life event?  OTHER: caregiver for spouse has been difficult    Are you feeling anxious or having panic attacks?   No  Do you have  "any concerns with your use of alcohol or other drugs? No    Social History     Tobacco Use    Smoking status: Never     Passive exposure: Never    Smokeless tobacco: Never    Tobacco comments:     When pt was a teen, she smoked for 2 weeks. Non habit forming   Vaping Use    Vaping status: Never Used   Substance Use Topics    Alcohol use: Yes     Comment: Wine occasionally when out to dinners    Drug use: No         4/6/2024    10:54 PM 12/18/2024    10:50 AM 12/18/2024    10:59 AM   PHQ   PHQ-9 Total Score 6 4  5    Q9: Thoughts of better off dead/self-harm past 2 weeks Not at all  Not at all  Not at all        Patient-reported         1/23/2024     5:05 PM 4/6/2024    10:54 PM 12/18/2024    10:59 AM   MILES-7 SCORE   Total Score 2 (minimal anxiety) 2 (minimal anxiety) Incomplete   Total Score 2    2    2 2      Suicide Assessment Five-step Evaluation and Treatment (SAFE-T)      Review of Systems  Constitutional, neuro, ENT, endocrine, pulmonary, cardiac, gastrointestinal, genitourinary, musculoskeletal, integument and psychiatric systems are negative, except as otherwise noted.      Objective    /68 (BP Location: Right arm, Patient Position: Sitting, Cuff Size: Adult Regular)   Pulse 65   Temp 96.9  F (36.1  C) (Tympanic)   Resp 16   Ht 1.6 m (5' 3\")   Wt 74.8 kg (165 lb)   SpO2 95%   BMI 29.23 kg/m    Body mass index is 29.23 kg/m .  Physical Exam   GENERAL: alert and no distress  NECK: no adenopathy, no asymmetry, masses, or scars  RESP: lungs clear to auscultation - no rales, rhonchi or wheezes  CV: regular rate and rhythm, normal S1 S2, no S3 or S4, no murmur, click or rub, no peripheral edema  ABDOMEN: soft, nontender, no hepatosplenomegaly, no masses and bowel sounds normal  MS: no gross musculoskeletal defects noted, no edema  PSYCH: mentation appears normal, affect normal/bright    No results found for any visits on 12/18/24.        Signed Electronically by: Baylee Rodríguez MD      The " longitudinal plan of care for the diagnosis(es)/condition(s) as documented were addressed during this visit. Due to the added complexity in care, I will continue to support Iris in the subsequent management and with ongoing continuity of care.    Answers submitted by the patient for this visit:  General Questionnaire (Submitted on 12/16/2024)  Chief Complaint: Chronic problems general questions HPI Form  What is the reason for your visit today? : Follow up  How many days per week do you miss taking your medication?: 0  Questionnaire about: Chronic problems general questions HPI Form (Submitted on 12/16/2024)  Chief Complaint: Chronic problems general questions HPI Form

## 2024-12-19 ENCOUNTER — TELEPHONE (OUTPATIENT)
Dept: FAMILY MEDICINE | Facility: OTHER | Age: 84
End: 2024-12-19

## 2024-12-19 NOTE — TELEPHONE ENCOUNTER
Pt's insurance will not cover Cologuard because she aged out (recommendations are between 45 to 85 years). She can pay for it out of pocket if she wants? LM stating we will call back later.

## 2024-12-20 ENCOUNTER — MYC MEDICAL ADVICE (OUTPATIENT)
Dept: FAMILY MEDICINE | Facility: OTHER | Age: 84
End: 2024-12-20

## 2024-12-23 NOTE — TELEPHONE ENCOUNTER
Question- if patient has a family history of colon cancer, can a diagnostic colonoscopy be ordered, or would it still fall under a screening test?

## 2024-12-24 NOTE — TELEPHONE ENCOUNTER
Cologuard is really only recommended if no family history or no symptoms of colon cancer.  Because there is a family history, the ideal screening test would be a colonoscopy.  Is a colonoscopy something she would want with her age now?  Or is she having any symptoms?

## 2025-02-04 ASSESSMENT — PATIENT HEALTH QUESTIONNAIRE - PHQ9
10. IF YOU CHECKED OFF ANY PROBLEMS, HOW DIFFICULT HAVE THESE PROBLEMS MADE IT FOR YOU TO DO YOUR WORK, TAKE CARE OF THINGS AT HOME, OR GET ALONG WITH OTHER PEOPLE: SOMEWHAT DIFFICULT
SUM OF ALL RESPONSES TO PHQ QUESTIONS 1-9: 4
SUM OF ALL RESPONSES TO PHQ QUESTIONS 1-9: 4

## 2025-02-05 ENCOUNTER — OFFICE VISIT (OUTPATIENT)
Dept: FAMILY MEDICINE | Facility: OTHER | Age: 85
End: 2025-02-05
Attending: FAMILY MEDICINE
Payer: MEDICARE

## 2025-02-05 VITALS
TEMPERATURE: 97.6 F | SYSTOLIC BLOOD PRESSURE: 110 MMHG | OXYGEN SATURATION: 96 % | BODY MASS INDEX: 28.35 KG/M2 | RESPIRATION RATE: 16 BRPM | HEIGHT: 63 IN | DIASTOLIC BLOOD PRESSURE: 72 MMHG | WEIGHT: 160 LBS | HEART RATE: 76 BPM

## 2025-02-05 DIAGNOSIS — F33.0 MILD EPISODE OF RECURRENT MAJOR DEPRESSIVE DISORDER: ICD-10-CM

## 2025-02-05 PROBLEM — I47.20 VENTRICULAR TACHYCARDIA, UNSPECIFIED (H): Status: RESOLVED | Noted: 2024-04-11 | Resolved: 2025-02-05

## 2025-02-05 PROCEDURE — G0463 HOSPITAL OUTPT CLINIC VISIT: HCPCS | Mod: 25

## 2025-02-05 RX ORDER — CITALOPRAM HYDROBROMIDE 20 MG/1
30 TABLET ORAL DAILY
Qty: 135 TABLET | Refills: 1 | Status: SHIPPED | OUTPATIENT
Start: 2025-02-05

## 2025-02-05 ASSESSMENT — PAIN SCALES - GENERAL: PAINLEVEL_OUTOF10: MILD PAIN (3)

## 2025-02-05 ASSESSMENT — ENCOUNTER SYMPTOMS: NERVOUS/ANXIOUS: 1

## 2025-02-05 NOTE — PROGRESS NOTES
The longitudinal plan of care for the diagnosis(es)/condition(s) as documented were addressed during this visit. Due to the added complexity in care, I will continue to support Iris in the subsequent management and with ongoing continuity of care.      Assessment & Plan     Mild episode of recurrent major depressive disorder  Her  is needing more cares, she can't bring him to a nursing home or memory care unit as he still knows her name  He sleeps well at night and so does she, but she has lost interest in most things she enjoys and misses being able to take bus trips with her girlfriends  Encouraged therapy which she declines  Follow-up 6-8 weeks  - citalopram (CELEXA) 20 MG tablet; Take 1.5 tablets (30 mg) by mouth daily.      Patient was agreeable to this plan and had no further questions.  There are no Patient Instructions on file for this visit.    No follow-ups on file.    Subjective   Iris is a 84 year old, presenting for the following health issues:  Hypertension, Anxiety, and Depression        2/5/2025     2:46 PM   Additional Questions   Roomed by Leticia Guthrie   Accompanied by None         2/5/2025     2:46 PM   Patient Reported Additional Medications   Patient reports taking the following new medications None     Anxiety    History of Present Illness       Back Pain:  She presents for follow up of back pain. Patient's back pain is a new problem.    Original cause of back pain: lifting  First noticed back pain: in the last week  Patient feels back pain: comes and goesLocation of back pain:  Left lower back and left side of waist  Description of back pain: gnawing  Back pain spreads: left buttocks    Since patient first noticed back pain, pain is: gradually improving  Does back pain interfere with her job:  Not applicable  On a scale of 1-10 (10 being the worst), patient describes pain as:  4  What makes back pain worse: other   Acupuncture: not tried  Acetaminophen: helpful  Activity or  exercise: not tried  Chiropractor:  Not tried  Cold: not tried  Heat: not tried  Massage: not tried  Muscle relaxants: not tried  NSAIDS: not tried  Opioids: not tried  Physical Therapy: not tried  Rest: helpful  Steroid Injection: not tried  Stretching: not tried  Surgery: not tried  TENS unit: not tried  Topical pain relievers: not tried  Other healthcare providers patient is seeing for back pain: None    She eats 0-1 servings of fruits and vegetables daily.She consumes 0 sweetened beverage(s) daily.She exercises with enough effort to increase her heart rate 10 to 19 minutes per day.  She exercises with enough effort to increase her heart rate 7 days per week.   She is taking medications regularly.       Hypertension Follow-up    Do you check your blood pressure regularly outside of the clinic? No   Are you following a low salt diet? Yes  Are your blood pressures ever more than 140 on the top number (systolic) OR more   than 90 on the bottom number (diastolic), for example 140/90? N/A    Depression and Anxiety   How are you doing with your depression since your last visit? Worsened   How are you doing with your anxiety since your last visit?  Worsened   Are you having other symptoms that might be associated with depression or anxiety? Less motivation   Have you had a significant life event? OTHER: care giver stress     Do you have any concerns with your use of alcohol or other drugs? No    Social History     Tobacco Use    Smoking status: Former     Types: Cigarettes     Passive exposure: Never    Smokeless tobacco: Never    Tobacco comments:     Was only a sort time 1971 to 1973   Vaping Use    Vaping status: Never Used   Substance Use Topics    Alcohol use: Yes     Comment: Wine occasionally when out to dinners    Drug use: No         12/18/2024    10:50 AM 12/18/2024    10:59 AM 2/4/2025    11:14 PM   PHQ   PHQ-9 Total Score 4  5  4    Q9: Thoughts of better off dead/self-harm past 2 weeks Not at all Not at all  " Not at all       Patient-reported    Proxy-reported         1/23/2024     5:05 PM 4/6/2024    10:54 PM 12/18/2024    10:59 AM   MILES-7 SCORE   Total Score 2 (minimal anxiety) 2 (minimal anxiety) Incomplete   Total Score 2    2    2 2          2/4/2025    11:14 PM   Last PHQ-9   1.  Little interest or pleasure in doing things 1   2.  Feeling down, depressed, or hopeless 1   3.  Trouble falling or staying asleep, or sleeping too much 0   4.  Feeling tired or having little energy 1   5.  Poor appetite or overeating 1   6.  Feeling bad about yourself 0   7.  Trouble concentrating 0   8.  Moving slowly or restless 0   Q9: Thoughts of better off dead/self-harm past 2 weeks 0   PHQ-9 Total Score 4        Patient-reported         4/6/2024    10:54 PM   MILES-7    1. Feeling nervous, anxious, or on edge 0   2. Not being able to stop or control worrying 0   3. Worrying too much about different things 1   4. Trouble relaxing 0   5. Being so restless that it is hard to sit still 0   6. Becoming easily annoyed or irritable 1   7. Feeling afraid, as if something awful might happen 0   MILES-7 Total Score 2   If you checked any problems, how difficult have they made it for you to do your work, take care of things at home, or get along with other people? Not difficult at all       Suicide Assessment Five-step Evaluation and Treatment (SAFE-T)      Review of Systems  Constitutional, neuro, ENT, endocrine, pulmonary, cardiac, gastrointestinal, genitourinary, musculoskeletal, integument and psychiatric systems are negative, except as otherwise noted.      Objective    /72 (BP Location: Right arm, Patient Position: Sitting, Cuff Size: Adult Regular)   Pulse 76   Temp 97.6  F (36.4  C) (Tympanic)   Resp 16   Ht 1.6 m (5' 3\")   Wt 72.6 kg (160 lb)   SpO2 96%   BMI 28.34 kg/m    Body mass index is 28.34 kg/m .  Physical Exam   GENERAL: alert and no distress  RESP: lungs clear to auscultation - no rales, rhonchi or wheezes  CV: " regular rate and rhythm, normal S1 S2, no S3 or S4, no murmur, click or rub, no peripheral edema  MS: no gross musculoskeletal defects noted, no edema  PSYCH: mentation appears normal, affect flat but brightens, judgement and insight intact, and appearance well groomed    No results found for any visits on 02/05/25.        Signed Electronically by: Baylee Rodríguez MD

## 2025-02-12 DIAGNOSIS — F33.0 MILD EPISODE OF RECURRENT MAJOR DEPRESSIVE DISORDER: ICD-10-CM

## 2025-02-12 RX ORDER — CITALOPRAM HYDROBROMIDE 20 MG/1
TABLET ORAL
Qty: 90 TABLET | Refills: 2 | OUTPATIENT
Start: 2025-02-12

## 2025-02-12 NOTE — TELEPHONE ENCOUNTER
Celexa      Last Written Prescription Date:  2/5/25  Last Fill Quantity: 135,   # refills: 1  Last Office Visit: 2/5/25  Future Office visit:    Next 5 appointments (look out 90 days)      Apr 17, 2025 2:15 PM  (Arrive by 2:00 PM)  Adult Preventative Visit with Baylee Rodríguez MD  [facility] [place] ([facility] [place] ) [address]  [place] [address]  [phone]             Routing refill request to provider for review/approval because:

## 2025-02-12 NOTE — TELEPHONE ENCOUNTER
Please call patient, we can't increase her celexa to 30mg second to possibly causing more heart issues -- I can give her some vistaril to try a few times a day for anxiety if she would like

## 2025-02-13 RX ORDER — CITALOPRAM HYDROBROMIDE 20 MG/1
20 TABLET ORAL DAILY
Qty: 90 TABLET | Refills: 1 | Status: SHIPPED | OUTPATIENT
Start: 2025-02-13

## 2025-02-13 NOTE — TELEPHONE ENCOUNTER
"2/13/2025 10:08 AM  Writer called pt. Pt notified on PCP's note below.  Pt reports she is taking Celexa 20 mg daily.   Pt denies wanting to try Vistaril at this time. Pt denies feeling more anxious.    Pt reports she can continue on the 20 mg Celexa, but is questioning why the script on 2/5/25 was sent to increase the dose to 30 mg daily. Pt stated, \"I will take whatever dose she thinks I need, but [facility] told [facility] they received the Celexa prescription to increase to 30 mg daily so I'm confused\".     Chandrika Lion RN    "

## 2025-02-13 NOTE — TELEPHONE ENCOUNTER
Pt notified regarding note from provider. Pt verbalizes understanding and agrees to plan. Celexa 20 mg daily.   Chandrika Lion RN

## 2025-03-17 ENCOUNTER — MYC REFILL (OUTPATIENT)
Dept: FAMILY MEDICINE | Facility: OTHER | Age: 85
End: 2025-03-17

## 2025-03-17 DIAGNOSIS — M81.0 AGE-RELATED OSTEOPOROSIS WITHOUT CURRENT PATHOLOGICAL FRACTURE: ICD-10-CM

## 2025-03-17 RX ORDER — ALENDRONATE SODIUM 70 MG/1
70 TABLET ORAL
Qty: 12 TABLET | Refills: 3 | Status: SHIPPED | OUTPATIENT
Start: 2025-03-17

## 2025-03-17 NOTE — TELEPHONE ENCOUNTER
Fosamax 70 mg       Last Written Prescription Date:  04/11/2024  Last Fill Quantity: 12,   # refills: 3  Last Office Visit: 02/05/2025  Future Office visit:    Next 5 appointments (look out 90 days)      Apr 17, 2025 2:15 PM  (Arrive by 2:00 PM)  Adult Preventative Visit with Baylee Rodríguez MD  [facility] [place] ([facility] [place] ) [address]  [place] [address]  [phone]

## 2025-04-16 SDOH — HEALTH STABILITY: PHYSICAL HEALTH: ON AVERAGE, HOW MANY DAYS PER WEEK DO YOU ENGAGE IN MODERATE TO STRENUOUS EXERCISE (LIKE A BRISK WALK)?: 0 DAYS

## 2025-04-16 SDOH — HEALTH STABILITY: PHYSICAL HEALTH: ON AVERAGE, HOW MANY MINUTES DO YOU ENGAGE IN EXERCISE AT THIS LEVEL?: 0 MIN

## 2025-04-16 ASSESSMENT — SOCIAL DETERMINANTS OF HEALTH (SDOH): HOW OFTEN DO YOU GET TOGETHER WITH FRIENDS OR RELATIVES?: ONCE A WEEK

## 2025-04-17 ENCOUNTER — OFFICE VISIT (OUTPATIENT)
Dept: FAMILY MEDICINE | Facility: OTHER | Age: 85
End: 2025-04-17
Attending: FAMILY MEDICINE
Payer: MEDICARE

## 2025-04-17 ENCOUNTER — LAB (OUTPATIENT)
Dept: LAB | Facility: OTHER | Age: 85
End: 2025-04-17
Attending: FAMILY MEDICINE
Payer: MEDICARE

## 2025-04-17 VITALS
HEIGHT: 63 IN | DIASTOLIC BLOOD PRESSURE: 80 MMHG | TEMPERATURE: 96.8 F | BODY MASS INDEX: 27.93 KG/M2 | HEART RATE: 75 BPM | WEIGHT: 157.6 LBS | OXYGEN SATURATION: 96 % | RESPIRATION RATE: 14 BRPM | SYSTOLIC BLOOD PRESSURE: 122 MMHG

## 2025-04-17 DIAGNOSIS — Z00.00 MEDICARE ANNUAL WELLNESS VISIT, SUBSEQUENT: Primary | ICD-10-CM

## 2025-04-17 DIAGNOSIS — F33.0 MILD EPISODE OF RECURRENT MAJOR DEPRESSIVE DISORDER: ICD-10-CM

## 2025-04-17 DIAGNOSIS — I47.10 PAROXYSMAL SUPRAVENTRICULAR TACHYCARDIA: ICD-10-CM

## 2025-04-17 DIAGNOSIS — E78.5 HYPERLIPIDEMIA LDL GOAL <100: Primary | ICD-10-CM

## 2025-04-17 DIAGNOSIS — M81.0 AGE-RELATED OSTEOPOROSIS WITHOUT CURRENT PATHOLOGICAL FRACTURE: ICD-10-CM

## 2025-04-17 DIAGNOSIS — E78.5 HYPERLIPIDEMIA LDL GOAL <100: ICD-10-CM

## 2025-04-17 DIAGNOSIS — K21.9 GASTROESOPHAGEAL REFLUX DISEASE, UNSPECIFIED WHETHER ESOPHAGITIS PRESENT: ICD-10-CM

## 2025-04-17 DIAGNOSIS — F41.1 GAD (GENERALIZED ANXIETY DISORDER): ICD-10-CM

## 2025-04-17 DIAGNOSIS — E55.9 HYPOVITAMINOSIS D: ICD-10-CM

## 2025-04-17 DIAGNOSIS — I10 BENIGN ESSENTIAL HYPERTENSION: ICD-10-CM

## 2025-04-17 DIAGNOSIS — H81.13 BENIGN PAROXYSMAL POSITIONAL VERTIGO DUE TO BILATERAL VESTIBULAR DISORDER: ICD-10-CM

## 2025-04-17 DIAGNOSIS — E04.1 NODULE OF RIGHT LOBE OF THYROID GLAND: ICD-10-CM

## 2025-04-17 LAB
ALBUMIN SERPL BCG-MCNC: 4.2 G/DL (ref 3.5–5.2)
ALP SERPL-CCNC: 56 U/L (ref 40–150)
ALT SERPL W P-5'-P-CCNC: 27 U/L (ref 0–50)
ANION GAP SERPL CALCULATED.3IONS-SCNC: 12 MMOL/L (ref 7–15)
AST SERPL W P-5'-P-CCNC: 34 U/L (ref 0–45)
BILIRUB SERPL-MCNC: 0.6 MG/DL
BUN SERPL-MCNC: 11.5 MG/DL (ref 8–23)
CALCIUM SERPL-MCNC: 9.8 MG/DL (ref 8.8–10.4)
CHLORIDE SERPL-SCNC: 101 MMOL/L (ref 98–107)
CHOLEST SERPL-MCNC: 168 MG/DL
CREAT SERPL-MCNC: 0.83 MG/DL (ref 0.51–0.95)
EGFRCR SERPLBLD CKD-EPI 2021: 68 ML/MIN/1.73M2
GLUCOSE SERPL-MCNC: 101 MG/DL (ref 70–99)
HCO3 SERPL-SCNC: 26 MMOL/L (ref 22–29)
HDLC SERPL-MCNC: 68 MG/DL
HOLD SPECIMEN: NORMAL
LDLC SERPL CALC-MCNC: 76 MG/DL
NONHDLC SERPL-MCNC: 100 MG/DL
POTASSIUM SERPL-SCNC: 4.6 MMOL/L (ref 3.4–5.3)
PROT SERPL-MCNC: 7.4 G/DL (ref 6.4–8.3)
SODIUM SERPL-SCNC: 139 MMOL/L (ref 135–145)
TRIGL SERPL-MCNC: 118 MG/DL
TSH SERPL DL<=0.005 MIU/L-ACNC: 1.03 UIU/ML (ref 0.3–4.2)

## 2025-04-17 PROCEDURE — 82310 ASSAY OF CALCIUM: CPT | Mod: ZL | Performed by: FAMILY MEDICINE

## 2025-04-17 PROCEDURE — G0463 HOSPITAL OUTPT CLINIC VISIT: HCPCS

## 2025-04-17 PROCEDURE — 80061 LIPID PANEL: CPT | Mod: ZL | Performed by: FAMILY MEDICINE

## 2025-04-17 PROCEDURE — 84443 ASSAY THYROID STIM HORMONE: CPT | Mod: ZL | Performed by: FAMILY MEDICINE

## 2025-04-17 PROCEDURE — 36415 COLL VENOUS BLD VENIPUNCTURE: CPT | Mod: ZL

## 2025-04-17 RX ORDER — CITALOPRAM HYDROBROMIDE 20 MG/1
20 TABLET ORAL DAILY
Qty: 90 TABLET | Refills: 3 | Status: SHIPPED | OUTPATIENT
Start: 2025-04-17

## 2025-04-17 RX ORDER — FAMOTIDINE 40 MG/1
40 TABLET, FILM COATED ORAL DAILY
Qty: 90 TABLET | Refills: 3 | Status: SHIPPED | OUTPATIENT
Start: 2025-04-17

## 2025-04-17 RX ORDER — ALENDRONATE SODIUM 70 MG/1
70 TABLET ORAL
Qty: 12 TABLET | Refills: 3 | Status: SHIPPED | OUTPATIENT
Start: 2025-04-17

## 2025-04-17 RX ORDER — METOPROLOL SUCCINATE 25 MG/1
25 TABLET, EXTENDED RELEASE ORAL DAILY
Qty: 90 TABLET | Refills: 3 | Status: SHIPPED | OUTPATIENT
Start: 2025-04-17

## 2025-04-17 RX ORDER — ROSUVASTATIN CALCIUM 20 MG/1
20 TABLET, COATED ORAL DAILY
Qty: 90 TABLET | Refills: 3 | Status: SHIPPED | OUTPATIENT
Start: 2025-04-17

## 2025-04-17 ASSESSMENT — ANXIETY QUESTIONNAIRES
2. NOT BEING ABLE TO STOP OR CONTROL WORRYING: NOT AT ALL
3. WORRYING TOO MUCH ABOUT DIFFERENT THINGS: NOT AT ALL
1. FEELING NERVOUS, ANXIOUS, OR ON EDGE: SEVERAL DAYS
GAD7 TOTAL SCORE: 2
GAD7 TOTAL SCORE: 2
5. BEING SO RESTLESS THAT IT IS HARD TO SIT STILL: NOT AT ALL
7. FEELING AFRAID AS IF SOMETHING AWFUL MIGHT HAPPEN: NOT AT ALL
IF YOU CHECKED OFF ANY PROBLEMS ON THIS QUESTIONNAIRE, HOW DIFFICULT HAVE THESE PROBLEMS MADE IT FOR YOU TO DO YOUR WORK, TAKE CARE OF THINGS AT HOME, OR GET ALONG WITH OTHER PEOPLE: SOMEWHAT DIFFICULT
4. TROUBLE RELAXING: NOT AT ALL
6. BECOMING EASILY ANNOYED OR IRRITABLE: SEVERAL DAYS

## 2025-04-17 ASSESSMENT — PAIN SCALES - GENERAL: PAINLEVEL_OUTOF10: NO PAIN (0)

## 2025-04-17 NOTE — PROGRESS NOTES
The longitudinal plan of care for the diagnosis(es)/condition(s) as documented were addressed during this visit. Due to the added complexity in care, I will continue to support Iris in the subsequent management and with ongoing continuity of care.    Preventive Care Visit  [facility]  Baylee Rodríguez MD, Family Medicine  Apr 17, 2025      Assessment & Plan     Medicare annual wellness visit, subsequent  Follow-up 1 year    Benign paroxysmal positional vertigo due to bilateral vestibular disorder  stable    Gastroesophageal reflux disease, unspecified whether esophagitis present  Worse when she has constipation  - famotidine (PEPCID) 40 MG tablet; Take 1 tablet (40 mg) by mouth daily.    Hyperlipidemia LDL goal <100  Doing well, refill crestor  - Comprehensive metabolic panel (BMP + Alb, Alk Phos, ALT, AST, Total. Bili, TP)  - Lipid Profile (Chol, Trig, HDL, LDL calc)  - rosuvastatin (CRESTOR) 20 MG tablet; Take 1 tablet (20 mg) by mouth daily.    Benign essential hypertension  Stable, refilled medication  - metoprolol succinate ER (TOPROL XL) 25 MG 24 hr tablet; Take 1 tablet (25 mg) by mouth daily.    Paroxysmal supraventricular tachycardia  stable    Age-related osteoporosis without current pathological fracture  Due for dexa, refilled fosamax for now  - DX Bone Density; Future  - alendronate (FOSAMAX) 70 MG tablet; Take 1 tablet (70 mg) by mouth every 7 days.    MILES (generalized anxiety disorder)  Tough times with her husbands condition deteriorating    Mild episode of recurrent major depressive disorder  Refilled, encouraged therapy  - Vitamin B12  - citalopram (CELEXA) 20 MG tablet; Take 1 tablet (20 mg) by mouth daily.    Hypovitaminosis D  Labs pending  - Vitamin D Deficiency    Nodule of right lobe of thyroid gland  Due for thyroid ultrasound  - TSH with free T4 reflex  - US Thyroid; Future    Patient has been advised of split billing requirements and indicates understanding:  "Yes    BMI  Estimated body mass index is 27.92 kg/m  as calculated from the following:    Height as of this encounter: 1.6 m (5' 3\").    Weight as of this encounter: 71.5 kg (157 lb 9.6 oz).   Weight management plan: Discussed healthy diet and exercise guidelines    Counseling  Appropriate preventive services were addressed with this patient via screening, questionnaire, or discussion as appropriate for fall prevention, nutrition, physical activity, Tobacco-use cessation, social engagement, weight loss and cognition.  Checklist reviewing preventive services available has been given to the patient.  Reviewed patient's diet, addressing concerns and/or questions.   Discussed possible causes of fatigue.     Patient was agreeable to this plan and had no further questions.  Follow-up       Jenifer Simmons is a 84 year old, presenting for the following:  Wellness Visit        4/17/2025     2:04 PM   Additional Questions   Roomed by Rizwan Rodríguez   Accompanied by None         4/17/2025     2:04 PM   Patient Reported Additional Medications   Patient reports taking the following new medications None           HPI     Hyperlipidemia Follow-Up  Are you regularly taking any medication or supplement to lower your cholesterol?   No  Are you having muscle aches or other side effects that you think could be caused by your cholesterol lowering medication?  No    Hypertension Follow-up  Do you check your blood pressure regularly outside of the clinic? No   Are you following a low salt diet? Yes  Are your blood pressures ever more than 140 on the top number (systolic) OR more   than 90 on the bottom number (diastolic), for example 140/90? N/A    BP Readings from Last 2 Encounters:   04/17/25 122/80   02/05/25 110/72     Depression and Anxiety   How are you doing with your depression since your last visit? No change  How are you doing with your anxiety since your last visit?  No change  Are you having other symptoms that might be " associated with depression or anxiety? No  Have you had a significant life event? OTHER: Takes care of  with dementia     Do you have any concerns with your use of alcohol or other drugs? No    Social History     Tobacco Use    Smoking status: Former     Types: Cigarettes     Passive exposure: Past    Smokeless tobacco: Never    Tobacco comments:     Was only a sort time 1971 to 1973   Vaping Use    Vaping status: Never Used   Substance Use Topics    Alcohol use: Yes     Comment: Wine occasionally when out to dinners    Drug use: No         12/18/2024    10:59 AM 2/4/2025    11:14 PM 4/16/2025     9:27 AM   PHQ   PHQ-9 Total Score 5  4  4    Q9: Thoughts of better off dead/self-harm past 2 weeks Not at all  Not at all Not at all       Patient-reported    Proxy-reported         4/6/2024    10:54 PM 12/18/2024    10:59 AM 4/17/2025     2:00 PM   MILES-7 SCORE   Total Score 2 (minimal anxiety) Incomplete    Total Score 2  2         4/16/2025     9:27 AM   Last PHQ-9   1.  Little interest or pleasure in doing things 1   2.  Feeling down, depressed, or hopeless 1   3.  Trouble falling or staying asleep, or sleeping too much 0   4.  Feeling tired or having little energy 1   5.  Poor appetite or overeating 1   6.  Feeling bad about yourself 0   7.  Trouble concentrating 0   8.  Moving slowly or restless 0   Q9: Thoughts of better off dead/self-harm past 2 weeks 0   PHQ-9 Total Score 4        Patient-reported         4/17/2025     2:00 PM   MILES-7    1. Feeling nervous, anxious, or on edge 1   2. Not being able to stop or control worrying 0   3. Worrying too much about different things 0   4. Trouble relaxing 0   5. Being so restless that it is hard to sit still 0   6. Becoming easily annoyed or irritable 1   7. Feeling afraid, as if something awful might happen 0   MILES-7 Total Score 2   If you checked any problems, how difficult have they made it for you to do your work, take care of things at home, or get along with  other people? Somewhat difficult   Suicide Assessment Five-step Evaluation and Treatment (SAFE-T)  Advance Care Planning    Discussed advance care planning with patient; informed AVS has link to [facility].        4/16/2025   General Health   How would you rate your overall physical health? Good   Feel stress (tense, anxious, or unable to sleep) Only a little   (!) STRESS CONCERN      4/16/2025   Nutrition   Diet: Regular (no restrictions)         4/16/2025   Exercise   Days per week of moderate/strenous exercise 0 days   Average minutes spent exercising at this level 0 min   (!) EXERCISE CONCERN      4/16/2025   Social Factors   Frequency of gathering with friends or relatives Once a week   Worry food won't last until get money to buy more No   Food not last or not have enough money for food? No   Do you have housing? (Housing is defined as stable permanent housing and does not include staying ouside in a car, in a tent, in an abandoned building, in an overnight shelter, or couch-surfing.) Yes   Are you worried about losing your housing? No   Lack of transportation? No   Unable to get utilities (heat,electricity)? No         4/17/2025   Fall Risk   Gait Speed Test Interpretation Less than or equal to 5.00 seconds - PASS           4/16/2025   Activities of Daily Living- Home Safety   Needs help with the following daily activites None of the above   Safety concerns in the home None of the above         4/16/2025   Dental   Dentist two times every year? Yes         4/16/2025   Hearing Screening   Hearing concerns? None of the above         4/16/2025   Driving Risk Screening   Patient/family members have concerns about driving No         4/16/2025   General Alertness/Fatigue Screening   Have you been more tired than usual lately? (!) YES         4/16/2025   Urinary Incontinence Screening   Bothered by leaking urine in past 6 months No       Today's PHQ-9 Score:       4/16/2025     9:27 AM   PHQ-9 SCORE   PHQ-9  Total Score MyChart 4 (Minimal depression)   PHQ-9 Total Score 4        Patient-reported         4/16/2025   Substance Use   Alcohol more than 3/day or more than 7/wk No   Do you have a current opioid prescription? No   How severe/bad is pain from 1 to 10? 0/10 (No Pain)   Do you use any other substances recreationally? No     Social History     Tobacco Use    Smoking status: Former     Types: Cigarettes     Passive exposure: Past    Smokeless tobacco: Never    Tobacco comments:     Was only a sort time 1971 to 1973   Vaping Use    Vaping status: Never Used   Substance Use Topics    Alcohol use: Yes     Comment: Wine occasionally when out to dinners    Drug use: No          Mammogram Screening - After age 74- determine frequency with patient based on health status, life expectancy and patient goals          Reviewed and updated as needed this visit by Provider                  Past Medical History:   Diagnosis Date    Blood glucose elevated     BPPV (benign paroxysmal positional vertigo)     Cystic disease of ovaries     Essential hypertension 07/2006    Family history of malignant neoplasm of gastrointestinal tract 04/25/2003    colon cancer in mother    [facility]    Macular degeneration of both eyes 09/2024    getting shots, 1 wet and 1 dry, shots 11/24    Osteoarthritis 1/20011    Right hip [facility], Dr Nelson    Osteoporosis     Other abnormal blood chemistry 09/20/2012    Paroxysmal supraventricular tachycardia 08/09/2023    psoriasis 04/21/2011    Pure hypercholesterolemia 11/22/1999    Symptomatic menopausal or female climacteric states 03/31/2003    Thyroid nodule 05/24/2023    Unspecified closed fracture of pelvis 09/20/2011    Urethral stricture      Past Surgical History:   Procedure Laterality Date    APPENDECTOMY  01/01/1944    AS REVISE TOTAL HIP REPLACEMENT Left 2018    Dr Whaley    CHOLECYSTECTOMY  01/01/1979    colonoscopies  1998, 2003, 2007, 2009, 9/18/2014     Colonic polyps. Repeat colonoscopy     Declines further mammograms  2009    Left superior and inferior pubic rami fractures  2006    ORTHOPEDIC SURGERY Right 2011    total right hip. Dr. Nelson    PACs, PVCs, SVT, bradycardia on Holter Moniter  2009    Dr. Pace    PHACOEMULSIFICATION WITH STANDARD INTRAOCULAR LENS IMPLANT Right 2024    Procedure: Phacoemulsification Cataract Extraction Posterior chamber Lens Right Eye;  Surgeon: Benji Lundy MD;  Location: [facility]    PHACOEMULSIFICATION WITH STANDARD INTRAOCULAR LENS IMPLANT Left 2024    Procedure: Phacoemulsification Cataract Extraction posterior Chamber Lens Left Eye;  Surgeon: Benji Lundy MD;  Location: [facility]    Urethral dilation  2003    Urethral stricture     OB History    Para Term  AB Living   1 1 1 0 0 1   SAB IAB Ectopic Multiple Live Births   0 0 0 0 0      # Outcome Date GA Lbr Moisés/2nd Weight Sex Type Anes PTL Lv   1 Term              Lab work is in process  Labs reviewed in EPIC  BP Readings from Last 3 Encounters:   25 122/80   25 110/72   24 134/68    Wt Readings from Last 3 Encounters:   25 71.5 kg (157 lb 9.6 oz)   25 72.6 kg (160 lb)   24 74.8 kg (165 lb)                  Patient Active Problem List   Diagnosis    Knee pain    Elevated LFTs    Sleep related leg cramps    Preventative health care    Elevated glucose    Mammogram declined    Advanced care planning/counseling discussion    Benign essential hypertension    Age-related osteoporosis without current pathological fracture    Lymphocytic colitis    Caregiver stress    Multiple joint pain    Benign paroxysmal positional vertigo due to bilateral vestibular disorder    Obesity, unspecified    Post-menopausal    Right foot pain    Acute bilateral thoracic back pain    Acute bilateral low back pain without sciatica    Gastroesophageal reflux disease, unspecified whether esophagitis present     Iron deficiency    High magnesium levels    Syncope and collapse    Episodic confusion    Vision changes    Nodule of right lobe of thyroid gland    Paroxysmal supraventricular tachycardia    Ventricular trigeminy    Transient alteration of awareness    Ventricular ectopy    Hyperlipidemia LDL goal <100    Cataract of both eyes, unspecified cataract type    MILES (generalized anxiety disorder)    Mild episode of recurrent major depressive disorder    Balance problem    Hand arthritis    Special screening for malignant neoplasms, colon    Hypovitaminosis D     Past Surgical History:   Procedure Laterality Date    APPENDECTOMY  1944    AS REVISE TOTAL HIP REPLACEMENT Left 2018    Dr Whaley    CHOLECYSTECTOMY  1979    colonoscopies  , , , , 2014    Colonic polyps. Repeat colonoscopy 2013    Declines further mammograms  2009    Left superior and inferior pubic rami fractures  2006    ORTHOPEDIC SURGERY Right 2011    total right hip. Dr. Nelson    PACs, PVCs, SVT, bradycardia on Holter Moniter  2009    Dr. Pace    PHACOEMULSIFICATION WITH STANDARD INTRAOCULAR LENS IMPLANT Right 2024    Procedure: Phacoemulsification Cataract Extraction Posterior chamber Lens Right Eye;  Surgeon: Benji Lundy MD;  Location: [facility]    PHACOEMULSIFICATION WITH STANDARD INTRAOCULAR LENS IMPLANT Left 2024    Procedure: Phacoemulsification Cataract Extraction posterior Chamber Lens Left Eye;  Surgeon: Benji Lundy MD;  Location: [facility]    Urethral dilation  2003    Urethral stricture       Social History     Tobacco Use    Smoking status: Former     Types: Cigarettes     Passive exposure: Past    Smokeless tobacco: Never    Tobacco comments:     Was only a sort time  to    Substance Use Topics    Alcohol use: Yes     Comment: Wine occasionally when out to dinners     Family History   Problem Relation Age of Onset    Diabetes Mother 59         age 59 of  pulmonar embolism  after amputation of leg due to diabetes    Alzheimer Disease Father 82    Coronary Artery Disease Sister         Heart attack    Colon Cancer Sister 84    Alzheimer Disease Sister         passed 2/24    Colon Cancer Brother 69    Lung Cancer Brother     Prostate Cancer Brother     Unknown/Adopted Maternal Grandmother     Unknown/Adopted Maternal Grandfather     Unknown/Adopted Paternal Grandmother     Unknown/Adopted Paternal Grandfather     Cancer Other         Colon-family h/o         Current Outpatient Medications   Medication Sig Dispense Refill    alendronate (FOSAMAX) 70 MG tablet Take 1 tablet (70 mg) by mouth every 7 days. 12 tablet 3    ASPIRIN 81 PO Take 81 mg by mouth every other day.      Calcium Carbonate-Vit D-Min (CALCIUM 1200 PO) Take  by mouth. Take 1 daily      citalopram (CELEXA) 20 MG tablet Take 1 tablet (20 mg) by mouth daily. 90 tablet 3    famotidine (PEPCID) 40 MG tablet Take 1 tablet (40 mg) by mouth daily. 90 tablet 3    Lactobacillus (ACIDOPHILUS PO) 1 daily      metoprolol succinate ER (TOPROL XL) 25 MG 24 hr tablet Take 1 tablet (25 mg) by mouth daily. 90 tablet 3    rosuvastatin (CRESTOR) 20 MG tablet Take 1 tablet (20 mg) by mouth daily. 90 tablet 3     Allergies   Allergen Reactions    Nabumetone Rash    Naproxen Nausea     Naprosyn    Rofecoxib      Upsets stomach  Vioxx     Recent Labs   Lab Test 04/17/25  1358 04/11/24  0900 05/16/23  2123 03/29/23  1448 12/02/22  0851 11/22/22  1331 02/16/21  0925 11/02/20  1347   A1C  --  6.0*  --  5.9* 6.0*  --  5.7*  --    LDL 76 65  --   --  120*  --  107*  --    HDL 68 60  --   --  67  --  70  --    TRIG 118 145  --   --  148  --  157*  --    ALT 27 18  --   --  32   < > 27  --    CR 0.83 0.69   < >  --  0.80   < > 0.74 0.67   GFRESTIMATED 68 85   < >  --  72   < > 76 82   GFRESTBLACK  --   --   --   --   --   --  88 >90   POTASSIUM 4.6 4.2   < >  --  4.7   < > 4.3 3.5   TSH 1.03 1.80   < >  --   --   --   --   --     < >  "= values in this interval not displayed.      Current providers sharing in care for this patient include:  Patient Care Team:  Baylee Rodríguez MD as PCP - General (Family Medicine)  Baylee Rodríguez MD as Assigned PCP    The following health maintenance items are reviewed in Epic and correct as of today:  Health Maintenance   Topic Date Due    DEPRESSION ACTION PLAN  Never done    ZOSTER IMMUNIZATION (1 of 2) Never done    RSV VACCINE (1 - 1-dose 75+ series) Never done    DTAP/TDAP/TD IMMUNIZATION (2 - Td or Tdap) 03/28/2023    COVID-19 Vaccine (5 - 2024-25 season) 09/01/2024    DEXA  01/27/2025    BMP  04/11/2025    LIPID  04/11/2025    MEDICARE ANNUAL WELLNESS VISIT  04/11/2025    PHQ-9  10/17/2025    FALL RISK ASSESSMENT  04/17/2026    ADVANCE CARE PLANNING  04/19/2029    INFLUENZA VACCINE  Completed    Pneumococcal Vaccine: 50+ Years  Completed    HPV IMMUNIZATION  Aged Out    MENINGITIS IMMUNIZATION  Aged Out    COLORECTAL CANCER SCREENING  Discontinued         Review of Systems  Constitutional, HEENT, cardiovascular, pulmonary, GI, , musculoskeletal, neuro, skin, endocrine and psych systems are negative, except as otherwise noted.     Objective    Exam  /80 (BP Location: Right arm, Patient Position: Sitting, Cuff Size: Adult Regular)   Pulse 75   Temp 96.8  F (36  C) (Tympanic)   Resp 14   Ht 1.6 m (5' 3\")   Wt 71.5 kg (157 lb 9.6 oz)   SpO2 96%   BMI 27.92 kg/m     Estimated body mass index is 27.92 kg/m  as calculated from the following:    Height as of this encounter: 1.6 m (5' 3\").    Weight as of this encounter: 71.5 kg (157 lb 9.6 oz).    Physical Exam  GENERAL: alert and no distress  EYES: Eyes grossly normal to inspection, PERRL and conjunctivae and sclerae normal  HENT: ear canals and TM's normal, nose and mouth without ulcers or lesions  NECK: no adenopathy, no asymmetry, masses, or scars  RESP: lungs clear to auscultation - no rales, rhonchi or wheezes  CV: regular rate and " rhythm, normal S1 S2, no S3 or S4, no murmur, click or rub, no peripheral edema  ABDOMEN: soft, nontender, no hepatosplenomegaly, no masses and bowel sounds normal  MS: no gross musculoskeletal defects noted, no edema  SKIN: no suspicious lesions or rashes  NEURO: Normal strength and tone, mentation intact and speech normal  PSYCH: mentation appears normal, affect normal/bright        4/17/2025   Mini Cog   Clock Draw Score 2 Normal   3 Item Recall 3 objects recalled   Mini Cog Total Score 5              Signed Electronically by: Baylee Rodríguez MD    Answers submitted by the patient for this visit:  Patient Health Questionnaire (Submitted on 4/16/2025)  If you checked off any problems, how difficult have these problems made it for you to do your work, take care of things at home, or get along with other people?: Somewhat difficult  PHQ9 TOTAL SCORE: 4

## 2025-05-12 ENCOUNTER — RESULTS FOLLOW-UP (OUTPATIENT)
Dept: FAMILY MEDICINE | Facility: OTHER | Age: 85
End: 2025-05-12

## 2025-05-12 ENCOUNTER — HOSPITAL ENCOUNTER (OUTPATIENT)
Dept: BONE DENSITY | Facility: HOSPITAL | Age: 85
Discharge: HOME | End: 2025-05-12
Attending: FAMILY MEDICINE
Payer: MEDICARE

## 2025-05-12 ENCOUNTER — HOSPITAL ENCOUNTER (OUTPATIENT)
Dept: ULTRASOUND IMAGING | Facility: HOSPITAL | Age: 85
Discharge: HOME | End: 2025-05-12
Attending: FAMILY MEDICINE
Payer: MEDICARE

## 2025-05-12 DIAGNOSIS — M81.0 AGE-RELATED OSTEOPOROSIS WITHOUT CURRENT PATHOLOGICAL FRACTURE: ICD-10-CM

## 2025-05-12 DIAGNOSIS — E04.1 NODULE OF RIGHT LOBE OF THYROID GLAND: ICD-10-CM

## 2025-05-12 PROCEDURE — 77080 DXA BONE DENSITY AXIAL: CPT | Mod: 26 | Performed by: RADIOLOGY

## 2025-05-12 PROCEDURE — 76536 US EXAM OF HEAD AND NECK: CPT

## 2025-05-12 PROCEDURE — 76536 US EXAM OF HEAD AND NECK: CPT | Mod: 26 | Performed by: RADIOLOGY

## 2025-05-12 PROCEDURE — 77080 DXA BONE DENSITY AXIAL: CPT

## 2025-05-14 ENCOUNTER — RESULTS FOLLOW-UP (OUTPATIENT)
Dept: FAMILY MEDICINE | Facility: OTHER | Age: 85
End: 2025-05-14

## 2025-05-19 ENCOUNTER — OFFICE VISIT (OUTPATIENT)
Dept: FAMILY MEDICINE | Facility: OTHER | Age: 85
End: 2025-05-19
Attending: STUDENT IN AN ORGANIZED HEALTH CARE EDUCATION/TRAINING PROGRAM
Payer: MEDICARE

## 2025-05-19 ENCOUNTER — TELEPHONE (OUTPATIENT)
Dept: INFUSION THERAPY | Facility: OTHER | Age: 85
End: 2025-05-19

## 2025-05-19 VITALS
SYSTOLIC BLOOD PRESSURE: 126 MMHG | WEIGHT: 161.8 LBS | RESPIRATION RATE: 16 BRPM | HEIGHT: 63 IN | TEMPERATURE: 97.2 F | BODY MASS INDEX: 28.67 KG/M2 | DIASTOLIC BLOOD PRESSURE: 70 MMHG | HEART RATE: 79 BPM | OXYGEN SATURATION: 94 %

## 2025-05-19 DIAGNOSIS — M81.0 AGE-RELATED OSTEOPOROSIS WITHOUT CURRENT PATHOLOGICAL FRACTURE: Primary | ICD-10-CM

## 2025-05-19 PROCEDURE — G0463 HOSPITAL OUTPT CLINIC VISIT: HCPCS

## 2025-05-19 RX ORDER — EPINEPHRINE 1 MG/ML
0.3 INJECTION, SOLUTION INTRAMUSCULAR; SUBCUTANEOUS EVERY 5 MIN PRN
OUTPATIENT
Start: 2025-05-19

## 2025-05-19 RX ORDER — ALBUTEROL SULFATE 0.83 MG/ML
2.5 SOLUTION RESPIRATORY (INHALATION)
OUTPATIENT
Start: 2025-05-19

## 2025-05-19 RX ORDER — METHYLPREDNISOLONE SODIUM SUCCINATE 40 MG/ML
40 INJECTION INTRAMUSCULAR; INTRAVENOUS
Start: 2025-05-19

## 2025-05-19 RX ORDER — ALBUTEROL SULFATE 90 UG/1
1-2 INHALANT RESPIRATORY (INHALATION)
Start: 2025-05-19

## 2025-05-19 RX ORDER — MEPERIDINE HYDROCHLORIDE 25 MG/ML
25 INJECTION INTRAMUSCULAR; INTRAVENOUS; SUBCUTANEOUS
OUTPATIENT
Start: 2025-05-19

## 2025-05-19 RX ORDER — DIPHENHYDRAMINE HYDROCHLORIDE 50 MG/ML
50 INJECTION, SOLUTION INTRAMUSCULAR; INTRAVENOUS
Start: 2025-05-19

## 2025-05-19 RX ORDER — DIPHENHYDRAMINE HYDROCHLORIDE 50 MG/ML
25 INJECTION, SOLUTION INTRAMUSCULAR; INTRAVENOUS
Start: 2025-05-19

## 2025-05-19 ASSESSMENT — PAIN SCALES - GENERAL: PAINLEVEL_OUTOF10: NO PAIN (0)

## 2025-05-19 NOTE — PROGRESS NOTES
"  Assessment & Plan     Age-related osteoporosis without current pathological fracture  Recent dexa scan showed reduction in T score. Discussed Fosamax and has had difficulty taking it every week. Has only gotten 2/4 doses each month. Discussed trial of 10mg daily, but patient has had increased stress at home and has had difficulty with remembering medications. Given reduction in T score and current risks,  will start Prolia injections. Discussed continuing with current vitamin D and calcium supplement. Advised continuing with healthy lifestyle.  Will continue to monitor.                 Jenifer Simmons is a 85 year old, presenting for the following health issues:  Results        5/19/2025     3:06 PM   Additional Questions   Roomed by Tabitha CAMILO   Accompanied by self     HPI      Concern - results   Onset: 05/12/2025  Description: discuss dexa scan results- worsening dexa scan, discuss medication   Intensity: mild  Progression of Symptoms:  worsening  Accompanying Signs & Symptoms: none  Therapies tried and outcome: fosamax         Review of Systems  Constitutional, HEENT, cardiovascular, pulmonary, GI, , musculoskeletal, neuro, skin, endocrine and psych systems are negative, except as otherwise noted.      Objective    /70 (BP Location: Left arm, Patient Position: Sitting, Cuff Size: Adult Regular)   Pulse 79   Temp 97.2  F (36.2  C) (Tympanic)   Resp 16   Ht 1.6 m (5' 3\")   Wt 73.4 kg (161 lb 12.8 oz)   SpO2 94%   BMI 28.66 kg/m    Body mass index is 28.66 kg/m .  Physical Exam   GENERAL: alert and no distress  EYES: Eyes grossly normal to inspection,   RESP: lungs clear to auscultation - no rales, rhonchi or wheezes  CV: regular rate and rhythm, normal S1 S2, no S3 or S4, no murmur, click or rub, no peripheral edema  ABDOMEN: soft, nontender, no masses and bowel sounds normal  MS: no gross musculoskeletal defects noted, no edema  PSYCH: mentation appears normal, affect normal/bright       "     Signed Electronically by: Paz Webster PA-C

## 2025-05-19 NOTE — PROGRESS NOTES
Call received from Paz Webster PA-C for patient to receive prolia 60mg subq every 6 months with labs prior. Therapy plan placed while on phone with ordering provider, plan sent to ordering provider to review and sign.

## 2025-06-02 DIAGNOSIS — M81.0 AGE-RELATED OSTEOPOROSIS WITHOUT CURRENT PATHOLOGICAL FRACTURE: Primary | ICD-10-CM

## 2025-06-03 ENCOUNTER — MYC MEDICAL ADVICE (OUTPATIENT)
Dept: FAMILY MEDICINE | Facility: OTHER | Age: 85
End: 2025-06-03

## 2025-06-03 ENCOUNTER — LAB (OUTPATIENT)
Dept: LAB | Facility: OTHER | Age: 85
End: 2025-06-03
Payer: MEDICARE

## 2025-06-03 DIAGNOSIS — M81.0 AGE-RELATED OSTEOPOROSIS WITHOUT CURRENT PATHOLOGICAL FRACTURE: ICD-10-CM

## 2025-06-03 LAB
ALBUMIN SERPL BCG-MCNC: 4.1 G/DL (ref 3.5–5.2)
CALCIUM SERPL-MCNC: 9.6 MG/DL (ref 8.8–10.4)
CREAT SERPL-MCNC: 0.78 MG/DL (ref 0.51–0.95)
EGFRCR SERPLBLD CKD-EPI 2021: 73 ML/MIN/1.73M2

## 2025-06-03 PROCEDURE — 82040 ASSAY OF SERUM ALBUMIN: CPT | Mod: ZL

## 2025-06-03 PROCEDURE — 82310 ASSAY OF CALCIUM: CPT | Mod: ZL

## 2025-06-03 PROCEDURE — 82565 ASSAY OF CREATININE: CPT | Mod: ZL

## 2025-06-03 PROCEDURE — 36415 COLL VENOUS BLD VENIPUNCTURE: CPT | Mod: ZL

## 2025-06-04 ENCOUNTER — INFUSION THERAPY VISIT (OUTPATIENT)
Dept: INFUSION THERAPY | Facility: OTHER | Age: 85
End: 2025-06-04
Attending: STUDENT IN AN ORGANIZED HEALTH CARE EDUCATION/TRAINING PROGRAM
Payer: MEDICARE

## 2025-06-04 ENCOUNTER — OFFICE VISIT (OUTPATIENT)
Dept: FAMILY MEDICINE | Facility: OTHER | Age: 85
End: 2025-06-04
Attending: FAMILY MEDICINE
Payer: MEDICARE

## 2025-06-04 VITALS
HEIGHT: 63 IN | OXYGEN SATURATION: 95 % | DIASTOLIC BLOOD PRESSURE: 60 MMHG | SYSTOLIC BLOOD PRESSURE: 128 MMHG | BODY MASS INDEX: 29.04 KG/M2 | HEART RATE: 69 BPM | RESPIRATION RATE: 16 BRPM | TEMPERATURE: 96.9 F | WEIGHT: 163.9 LBS

## 2025-06-04 VITALS
BODY MASS INDEX: 29.15 KG/M2 | RESPIRATION RATE: 16 BRPM | OXYGEN SATURATION: 97 % | HEART RATE: 71 BPM | TEMPERATURE: 97.2 F | DIASTOLIC BLOOD PRESSURE: 67 MMHG | HEIGHT: 63 IN | WEIGHT: 164.5 LBS | SYSTOLIC BLOOD PRESSURE: 134 MMHG

## 2025-06-04 DIAGNOSIS — R73.03 PREDIABETES: ICD-10-CM

## 2025-06-04 DIAGNOSIS — E78.5 HYPERLIPIDEMIA LDL GOAL <100: ICD-10-CM

## 2025-06-04 DIAGNOSIS — E55.9 HYPOVITAMINOSIS D: ICD-10-CM

## 2025-06-04 DIAGNOSIS — M81.0 AGE-RELATED OSTEOPOROSIS WITHOUT CURRENT PATHOLOGICAL FRACTURE: Primary | ICD-10-CM

## 2025-06-04 DIAGNOSIS — K21.9 GASTROESOPHAGEAL REFLUX DISEASE, UNSPECIFIED WHETHER ESOPHAGITIS PRESENT: ICD-10-CM

## 2025-06-04 DIAGNOSIS — F33.0 MILD EPISODE OF RECURRENT MAJOR DEPRESSIVE DISORDER: ICD-10-CM

## 2025-06-04 DIAGNOSIS — I10 BENIGN ESSENTIAL HYPERTENSION: ICD-10-CM

## 2025-06-04 LAB
EST. AVERAGE GLUCOSE BLD GHB EST-MCNC: 117 MG/DL
HBA1C MFR BLD: 5.7 %

## 2025-06-04 PROCEDURE — 250N000011 HC RX IP 250 OP 636: Mod: JZ | Performed by: STUDENT IN AN ORGANIZED HEALTH CARE EDUCATION/TRAINING PROGRAM

## 2025-06-04 PROCEDURE — 36415 COLL VENOUS BLD VENIPUNCTURE: CPT | Mod: ZL | Performed by: FAMILY MEDICINE

## 2025-06-04 PROCEDURE — G0463 HOSPITAL OUTPT CLINIC VISIT: HCPCS

## 2025-06-04 PROCEDURE — 83036 HEMOGLOBIN GLYCOSYLATED A1C: CPT | Mod: ZL | Performed by: FAMILY MEDICINE

## 2025-06-04 PROCEDURE — 96372 THER/PROPH/DIAG INJ SC/IM: CPT | Performed by: STUDENT IN AN ORGANIZED HEALTH CARE EDUCATION/TRAINING PROGRAM

## 2025-06-04 RX ORDER — MEPERIDINE HYDROCHLORIDE 25 MG/ML
25 INJECTION INTRAMUSCULAR; INTRAVENOUS; SUBCUTANEOUS
OUTPATIENT
Start: 2025-12-01

## 2025-06-04 RX ORDER — ROSUVASTATIN CALCIUM 20 MG/1
20 TABLET, COATED ORAL DAILY
Qty: 90 TABLET | Refills: 3 | Status: SHIPPED | OUTPATIENT
Start: 2025-06-04

## 2025-06-04 RX ORDER — ALBUTEROL SULFATE 0.83 MG/ML
2.5 SOLUTION RESPIRATORY (INHALATION)
OUTPATIENT
Start: 2025-12-01

## 2025-06-04 RX ORDER — SELENIUM 50 MCG
1 TABLET ORAL DAILY
Qty: 90 CAPSULE | Refills: 3 | Status: SHIPPED | OUTPATIENT
Start: 2025-06-04

## 2025-06-04 RX ORDER — ALBUTEROL SULFATE 90 UG/1
1-2 INHALANT RESPIRATORY (INHALATION)
Start: 2025-12-01

## 2025-06-04 RX ORDER — METHYLPREDNISOLONE SODIUM SUCCINATE 40 MG/ML
40 INJECTION INTRAMUSCULAR; INTRAVENOUS
Start: 2025-12-01

## 2025-06-04 RX ORDER — EPINEPHRINE 1 MG/ML
0.3 INJECTION, SOLUTION, CONCENTRATE INTRAVENOUS EVERY 5 MIN PRN
OUTPATIENT
Start: 2025-12-01

## 2025-06-04 RX ORDER — METOPROLOL SUCCINATE 25 MG/1
25 TABLET, EXTENDED RELEASE ORAL DAILY
Qty: 90 TABLET | Refills: 3 | Status: SHIPPED | OUTPATIENT
Start: 2025-06-04

## 2025-06-04 RX ORDER — FAMOTIDINE 40 MG/1
40 TABLET, FILM COATED ORAL DAILY
Qty: 90 TABLET | Refills: 3 | Status: SHIPPED | OUTPATIENT
Start: 2025-06-04

## 2025-06-04 RX ORDER — DIPHENHYDRAMINE HYDROCHLORIDE 50 MG/ML
50 INJECTION, SOLUTION INTRAMUSCULAR; INTRAVENOUS
Start: 2025-12-01

## 2025-06-04 RX ORDER — DIPHENHYDRAMINE HYDROCHLORIDE 50 MG/ML
25 INJECTION, SOLUTION INTRAMUSCULAR; INTRAVENOUS
Start: 2025-12-01

## 2025-06-04 RX ORDER — CITALOPRAM HYDROBROMIDE 20 MG/1
20 TABLET ORAL DAILY
Qty: 90 TABLET | Refills: 3 | Status: SHIPPED | OUTPATIENT
Start: 2025-06-04

## 2025-06-04 RX ADMIN — DENOSUMAB 60 MG: 60 INJECTION SUBCUTANEOUS at 14:32

## 2025-06-04 ASSESSMENT — PAIN SCALES - GENERAL: PAINLEVEL_OUTOF10: NO PAIN (0)

## 2025-06-04 NOTE — TELEPHONE ENCOUNTER
Baylee Rodríguez MD to Me   Family Care Team 2 (Selected Message)  KB      6/4/25  9:28 AM  Yes, I'd recommend she still do this and then make appt to discuss changing meds in the future

## 2025-06-04 NOTE — PROGRESS NOTES
Infusion Nursing Note:  Iris Stroudon presents today for Prolia.    Patient seen by provider today: No   present during visit today: Not Applicable.    Note: N/A.    The following medication was given:     MEDICATION: Prolia  ROUTE: SQ  SITE: Arm - Left  DOSE: 60 mg    Treatment Conditions:  Results reviewed, labs MET treatment parameters, ok to proceed with treatment.  Component      Latest Ref Rng 6/3/2025  1:32 PM   Creatinine      0.51 - 0.95 mg/dL 0.78    GFR Estimate      >60 mL/min/1.73m2 73    Calcium      8.8 - 10.4 mg/dL 9.6        Post Infusion Assessment:  Patient tolerated injection without incident.       Discharge Plan:   Patient discharged in stable condition accompanied by: self.  Departure Mode: Ambulatory.

## 2025-06-04 NOTE — PROGRESS NOTES
The longitudinal plan of care for the diagnosis(es)/condition(s) as documented were addressed during this visit. Due to the added complexity in care, I will continue to support Iris in the subsequent management and with ongoing continuity of care.    Assessment & Plan     Mild episode of recurrent major depressive disorder  Doing ok, continues to care for her  at home  Meds refilled  - citalopram (CELEXA) 20 MG tablet; Take 1 tablet (20 mg) by mouth daily.    Gastroesophageal reflux disease, unspecified whether esophagitis present  Meds refilled  - famotidine (PEPCID) 40 MG tablet; Take 1 tablet (40 mg) by mouth daily.  - Lactobacillus (ACIDOPHILUS) CAPS; Take 1 capsule by mouth daily.    Benign essential hypertension  Meds refilled  - metoprolol succinate ER (TOPROL XL) 25 MG 24 hr tablet; Take 1 tablet (25 mg) by mouth daily.    Hyperlipidemia LDL goal <100  Meds refilled  - rosuvastatin (CRESTOR) 20 MG tablet; Take 1 tablet (20 mg) by mouth daily.    Age-related osteoporosis without current pathological fracture  Change to 600mg bid  Did well with prolia today  - calcium carbonate (OS-HORACIO) 1500 (600 Ca) MG tablet; Take 1 tablet (600 mg) by mouth 2 times daily (with meals).    Prediabetes  Would like to work on weight loss a bit  - Semaglutide (RYBELSUS) 7 MG tablet; Take 1 tablet (7 mg) by mouth daily.  - Hemoglobin A1c; Future  - Hemoglobin A1c    Hypovitaminosis D  Add extra vit d weekly  - vitamin D3 (CHOLECALCIFEROL) 250 mcg (38380 units) capsule; Take 1 capsule (250 mcg) by mouth once a week. With a meal      Patient was agreeable to this plan and had no further questions.  Follow-up       Subjective   Iris is a 85 year old, presenting for the following health issues:  Medication Follow-up        6/4/2025     3:20 PM   Additional Questions   Roomed by Ekaterina merida   Accompanied by Self         6/4/2025     3:20 PM   Patient Reported Additional Medications   Patient reports taking the following  new medications None     History of Present Illness       Reason for visit:  Revised meds in regards to infusion done today at hospital    She eats 2-3 servings of fruits and vegetables daily.She consumes 0 sweetened beverage(s) daily.She exercises with enough effort to increase her heart rate 30 to 60 minutes per day.  She exercises with enough effort to increase her heart rate 3 or less days per week. She is missing 1 dose(s) of medications per week.  She is not taking prescribed medications regularly due to remembering to take.        Medication Followup of citalopram, famotidine, metoprolol, and rosuvastatin  Taking Medication as prescribed: yes  Side Effects:  None  Medication Helping Symptoms:  yes, but feels that her current dose of citalopram is not as effective    Hyperlipidemia Follow-Up    Are you regularly taking any medication or supplement to lower your cholesterol?   Yes-    Are you having muscle aches or other side effects that you think could be caused by your cholesterol lowering medication?  No    Hypertension Follow-up    Do you check your blood pressure regularly outside of the clinic? No   Are you following a low salt diet? Yes  Are your blood pressures ever more than 140 on the top number (systolic) OR more   than 90 on the bottom number (diastolic), for example 140/90? No    BP Readings from Last 2 Encounters:   06/04/25 128/60   06/04/25 134/67     Depression and Anxiety   How are you doing with your depression since your last visit? No change  How are you doing with your anxiety since your last visit?  No change  Are you having other symptoms that might be associated with depression or anxiety? No  Have you had a significant life event? OTHER:  has dementia   Do you have any concerns with your use of alcohol or other drugs? No    Social History     Tobacco Use    Smoking status: Former     Current packs/day: 0.00     Average packs/day: 0.2 packs/day for 0.2 years     Types:  "Cigarettes     Start date: 1976     Quit date: 3/1/1976     Years since quittin.2     Passive exposure: Past    Smokeless tobacco: Never   Vaping Use    Vaping status: Never Used   Substance Use Topics    Alcohol use: Yes     Comment: Wine occasionally when out to dinners    Drug use: No         2024    10:59 AM 2025    11:14 PM 2025     9:27 AM   PHQ   PHQ-9 Total Score 5  4  4    Q9: Thoughts of better off dead/self-harm past 2 weeks Not at all  Not at all Not at all       Patient-reported    Proxy-reported         2024    10:54 PM 2024    10:59 AM 2025     2:00 PM   MILES-7 SCORE   Total Score 2 (minimal anxiety) Incomplete    Total Score 2  2     Suicide Assessment Five-step Evaluation and Treatment (SAFE-T)  How many servings of fruits and vegetables do you eat daily?  2-3  On average, how many sweetened beverages do you drink each day (Examples: soda, juice, sweet tea, etc.  Do NOT count diet or artificially sweetened beverages)?   1  How many days per week do you exercise enough to make your heart beat faster? 3 or less  How many minutes a day do you exercise enough to make your heart beat faster? 9 or less  How many days per week do you miss taking your medication? 0      Review of Systems  Constitutional, neuro, ENT, endocrine, pulmonary, cardiac, gastrointestinal, genitourinary, musculoskeletal, integument and psychiatric systems are negative, except as otherwise noted.      Objective    /60 (BP Location: Left arm, Patient Position: Sitting, Cuff Size: Adult Regular)   Pulse 69   Temp 96.9  F (36.1  C) (Tympanic)   Resp 16   Ht 1.6 m (5' 2.99\")   Wt 74.3 kg (163 lb 14.4 oz)   SpO2 95%   BMI 29.04 kg/m    Body mass index is 29.04 kg/m .  Physical Exam   GENERAL: alert and no distress  RESP: lungs clear to auscultation - no rales, rhonchi or wheezes  CV: regular rate and rhythm, normal S1 S2, no S3 or S4, no murmur, click or rub, no peripheral edema  ABDOMEN: " soft, nontender, no hepatosplenomegaly, no masses and bowel sounds normal  MS: no gross musculoskeletal defects noted, no edema  PSYCH: mentation appears normal, affect normal/bright    Results for orders placed or performed in visit on 06/04/25   Hemoglobin A1c     Status: Abnormal   Result Value Ref Range    Estimated Average Glucose 117 (H) <117 mg/dL    Hemoglobin A1C 5.7 (H) <5.7 %           Signed Electronically by: Baylee Rodríguez MD

## 2025-06-06 ENCOUNTER — RESULTS FOLLOW-UP (OUTPATIENT)
Dept: FAMILY MEDICINE | Facility: OTHER | Age: 85
End: 2025-06-06

## 2025-06-25 ENCOUNTER — MYC MEDICAL ADVICE (OUTPATIENT)
Dept: FAMILY MEDICINE | Facility: OTHER | Age: 85
End: 2025-06-25

## 2025-07-07 NOTE — NURSING NOTE
"Chief Complaint   Patient presents with     Physical       Initial /66 (BP Location: Left arm, Patient Position: Sitting, Cuff Size: Adult Regular)   Pulse 70   Temp 97.2  F (36.2  C) (Tympanic)   Ht 1.6 m (5' 3\")   Wt 70.3 kg (155 lb)   SpO2 99%   BMI 27.46 kg/m   Estimated body mass index is 27.46 kg/m  as calculated from the following:    Height as of this encounter: 1.6 m (5' 3\").    Weight as of this encounter: 70.3 kg (155 lb).  Medication Reconciliation: complete  Brisa Ma LPN  "
Action 3: Continue
Detail Level: Zone
Initiate Regimen: Clobetasol apply to the affected areas bid x 7-10 days

## 2025-08-26 ENCOUNTER — ALLIED HEALTH/NURSE VISIT (OUTPATIENT)
Dept: AUDIOLOGY | Facility: OTHER | Age: 85
End: 2025-08-26
Attending: AUDIOLOGIST
Payer: MEDICARE

## 2025-08-26 DIAGNOSIS — H91.93 DECREASED HEARING OF BOTH EARS: Primary | ICD-10-CM

## (undated) DEVICE — PACK EYE CUSTOM SEY32EPMBO

## (undated) DEVICE — BIN-CATARACT BIN BN37

## (undated) DEVICE — BIN-LENS IMPLANT CART

## (undated) DEVICE — EYE KNIFE MICRO 15DEG BEVEL 377516

## (undated) DEVICE — GLOVE PROTEXIS POWDER FREE CLSC 7.5  2D72PL75X

## (undated) DEVICE — EYE CANN IRR 25GA HYDRODISSECTING 585037

## (undated) DEVICE — INJECTOR PORT FOR IOL LENSES SOFPORT EZ-28

## (undated) DEVICE — EYE PREP BETADINE 5% SOLUTION 30ML 0065-0411-30

## (undated) DEVICE — EYE KNIFE SLIT XSTAR VISITEC 2.8MM 45DEG 373728

## (undated) DEVICE — INSTRUMENT WIPE VISIWIPE 581047

## (undated) DEVICE — CANNULA IRR 7/8IN 25GA VSTC VSCFL 45D 9MM DISP 585045

## (undated) DEVICE — PACK PHACO STELLARIS VAC 1 AUX DRP 1 NDL WRNCH BL5110

## (undated) DEVICE — GLOVE PROTEXIS POWDER FREE 7.0 ORTHOPEDIC 2D73ET70

## (undated) DEVICE — BIN-TECNIS DCB00 LENSES

## (undated) DEVICE — CANNULA IRR 7/8IN 30GA VSTC VSCFL 45D 9MM DISP 585046

## (undated) DEVICE — EYE CANN IRR 25GA CYSTOTOME 581610

## (undated) DEVICE — SET HANDPIECE IRRIG/ASPIRATION 2.2-2.8X5.4" 45DEG TIP 85910S